# Patient Record
Sex: FEMALE | Race: OTHER | Employment: UNEMPLOYED | URBAN - METROPOLITAN AREA
[De-identification: names, ages, dates, MRNs, and addresses within clinical notes are randomized per-mention and may not be internally consistent; named-entity substitution may affect disease eponyms.]

---

## 2019-02-28 ENCOUNTER — OFFICE VISIT (OUTPATIENT)
Dept: FAMILY MEDICINE CLINIC | Age: 39
End: 2019-02-28

## 2019-02-28 VITALS
TEMPERATURE: 98.3 F | WEIGHT: 162 LBS | HEIGHT: 63 IN | BODY MASS INDEX: 28.7 KG/M2 | HEART RATE: 93 BPM | SYSTOLIC BLOOD PRESSURE: 131 MMHG | DIASTOLIC BLOOD PRESSURE: 71 MMHG

## 2019-02-28 DIAGNOSIS — Z23 ENCOUNTER FOR IMMUNIZATION: ICD-10-CM

## 2019-02-28 DIAGNOSIS — L97.411 SKIN ULCER OF RIGHT HEEL, LIMITED TO BREAKDOWN OF SKIN (HCC): ICD-10-CM

## 2019-02-28 DIAGNOSIS — E11.65 UNCONTROLLED TYPE 2 DIABETES MELLITUS WITH HYPERGLYCEMIA (HCC): Primary | ICD-10-CM

## 2019-02-28 LAB
BILIRUB UR QL STRIP: NEGATIVE
GLUCOSE POC: NORMAL MG/DL
GLUCOSE UR-MCNC: NORMAL MG/DL
KETONES P FAST UR STRIP-MCNC: NEGATIVE MG/DL
PH UR STRIP: 7.5 [PH] (ref 4.6–8)
PROT UR QL STRIP: NEGATIVE
SP GR UR STRIP: 1.01 (ref 1–1.03)
UA UROBILINOGEN AMB POC: NORMAL (ref 0.2–1)
URINALYSIS CLARITY POC: CLEAR
URINALYSIS COLOR POC: YELLOW
URINE BLOOD POC: NEGATIVE
URINE LEUKOCYTES POC: NORMAL
URINE NITRITES POC: NEGATIVE

## 2019-02-28 RX ORDER — SULFAMETHOXAZOLE AND TRIMETHOPRIM 800; 160 MG/1; MG/1
1 TABLET ORAL 2 TIMES DAILY
Qty: 20 TAB | Refills: 0 | Status: SHIPPED | OUTPATIENT
Start: 2019-02-28 | End: 2019-03-10

## 2019-02-28 RX ORDER — SILVER SULFADIAZINE 10 G/1000G
CREAM TOPICAL 2 TIMES DAILY
Qty: 50 G | Refills: 0 | Status: SHIPPED | OUTPATIENT
Start: 2019-02-28 | End: 2019-03-10

## 2019-02-28 RX ORDER — METFORMIN HYDROCHLORIDE 1000 MG/1
1000 TABLET ORAL 2 TIMES DAILY WITH MEALS
Qty: 180 TAB | Refills: 3 | Status: SHIPPED | OUTPATIENT
Start: 2019-02-28 | End: 2021-03-24

## 2019-02-28 RX ORDER — SYRINGE-NEEDLE,INSULIN,0.5 ML 30 GX5/16"
SYRINGE, EMPTY DISPOSABLE MISCELLANEOUS
Qty: 100 SYRINGE | Refills: 3 | Status: SHIPPED | OUTPATIENT
Start: 2019-02-28

## 2019-02-28 RX ORDER — CEPHALEXIN 500 MG/1
500 CAPSULE ORAL 2 TIMES DAILY
Qty: 20 CAP | Refills: 0 | Status: SHIPPED | OUTPATIENT
Start: 2019-02-28 | End: 2019-03-10

## 2019-02-28 NOTE — PROGRESS NOTES
HISTORY OF PRESENT ILLNESS  [facility] ALANNA Armstrong is a 35 y.o. female. HPI  Patient states she has an ulcer on the back of her right heel 1 month ago. She suffers with diabetes and has not taken medications for 1 month. She arrived February 4th. States that about 1 month ago was wearing very tight shoes that cause the problem in the back of the right foot. Used metformin and insulin,  15 units in am and 10 uints in pm  Also having back pain, radiates to the left leg. It has been there for  Years. Review of Systems   Constitutional: Negative for chills, fever, malaise/fatigue and weight loss. Respiratory: Negative for cough, shortness of breath and wheezing. Cardiovascular: Negative for chest pain and palpitations. Gastrointestinal: Negative for abdominal pain, constipation, diarrhea, heartburn, nausea and vomiting. Musculoskeletal: Positive for back pain. Negative for myalgias. Skin:        Ulcer, right heel   Neurological: Negative for dizziness, tingling and headaches. /71 (BP 1 Location: Left arm, BP Patient Position: Sitting)   Pulse 93   Temp 98.3 °F (36.8 °C) (Oral)   Ht 5' 3.19\" (1.605 m)   Wt 162 lb (73.5 kg)   LMP 02/20/2019   BMI 28.53 kg/m²   Physical Exam   Constitutional: She is oriented to person, place, and time. She appears well-developed and well-nourished. HENT:   Head: Normocephalic. Right Ear: External ear normal.   Left Ear: External ear normal.   Eyes: Conjunctivae and EOM are normal. Pupils are equal, round, and reactive to light. Neck: Normal range of motion. Neck supple. Cardiovascular: Normal rate, regular rhythm and normal heart sounds. No murmur heard. Pulmonary/Chest: Effort normal and breath sounds normal.   Abdominal: Soft. Bowel sounds are normal. She exhibits no distension. Musculoskeletal: Normal range of motion. She exhibits no edema or tenderness. Neurological: She is alert and oriented to person, place, and time.    Skin:   2 cm diameter ulcer dorsal aspect of right heel       ASSESSMENT and PLAN  Diagnoses and all orders for this visit:    1. Uncontrolled type 2 diabetes mellitus with hyperglycemia (HCC)  -     AMB POC GLUCOSE BLOOD, BY GLUCOSE MONITORING DEVICE  -     AMB POC URINALYSIS DIP STICK MANUAL W/O MICRO  -     insulin NPH/insulin regular (NOVOLIN 70/30, HUMULIN 70/30) 100 unit/mL (70-30) injection; 15 units sc in am, 10 units sc in pm  -     metFORMIN (GLUCOPHAGE) 1,000 mg tablet; Take 1 Tab by mouth two (2) times daily (with meals). -     Insulin Syringe-Needle U-100 (INSULIN SYRINGE) 0.5 mL 30 gauge x 5/16\" syrg; Use twice a day for insulin administration ICD 10: E11.9    2. Skin ulcer of right heel, limited to breakdown of skin (HCC)  -     trimethoprim-sulfamethoxazole (BACTRIM DS, SEPTRA DS) 160-800 mg per tablet; Take 1 Tab by mouth two (2) times a day for 10 days. -     cephALEXin (KEFLEX) 500 mg capsule; Take 1 Cap by mouth two (2) times a day for 10 days. -     silver sulfADIAZINE (SILVADENE) 1 % topical cream; Apply  to affected area two (2) times a day for 10 days. Right heel    diabetes is uncontrolled, will restart medications today. She has a heel ulcer,  We will treat with antibiotics.   She may need to go be evaluated at the hospital   If no improvement  Follow up in 4 weeks

## 2019-02-28 NOTE — PROGRESS NOTES
Printed AVS, provided to pt and reviewed. Pt indicated understanding and had no questions. Told pt that rx's have been sent to pharmacy and they should be ready for  in approximately 2 hrs. Pt told to please present GoodRx. com coupon which we provide to your pharmacy to receive discounted price. The all the medication ordered today was reviewed with the pt Osman Barksdale was the . Reviewed DM foot care and importance of getting and keeping DM under control. Th ept stated she had gotten the foot ulcer [place], and she had run out of her insulin. Requests flu vaccine; denies fever, egg allergy. Immunization given per protocol and recorded in [address]. VIS information sheet given, explained possible S/E. Reviewed sx indicating need to be seen in ER. Pt had no adverse reaction at time of discharge.  Jeff Bynum RN

## 2019-02-28 NOTE — PROGRESS NOTES
Results for orders placed or performed in visit on 02/28/19   AMB POC GLUCOSE BLOOD, BY GLUCOSE MONITORING DEVICE   Result Value Ref Range    Glucose POC [facility] mg/dL   AMB POC URINALYSIS DIP STICK MANUAL W/O MICRO   Result Value Ref Range    Color (UA POC) Yellow     Clarity (UA POC) Clear     Glucose (UA POC) 4+ Negative    Bilirubin (UA POC) Negative Negative    Ketones (UA POC) Negative Negative    Specific gravity (UA POC) 1.010 1.001 - 1.035    Blood (UA POC) Negative Negative    pH (UA POC) 7.5 4.6 - 8.0    Protein (UA POC) Negative Negative    Urobilinogen (UA POC) normal 0.2 - 1    Nitrites (UA POC) Negative Negative    Leukocyte esterase (UA POC) 1+ Negative

## 2019-04-29 ENCOUNTER — OFFICE VISIT (OUTPATIENT)
Dept: FAMILY MEDICINE CLINIC | Age: 39
End: 2019-04-29

## 2019-04-29 ENCOUNTER — HOSPITAL ENCOUNTER (OUTPATIENT)
Dept: LAB | Age: 39
Discharge: HOME | End: 2019-04-29

## 2019-04-29 VITALS
WEIGHT: 162 LBS | DIASTOLIC BLOOD PRESSURE: 80 MMHG | OXYGEN SATURATION: 99 % | HEART RATE: 96 BPM | SYSTOLIC BLOOD PRESSURE: 114 MMHG | BODY MASS INDEX: 28.7 KG/M2 | HEIGHT: 63 IN | TEMPERATURE: 98.2 F

## 2019-04-29 DIAGNOSIS — E11.65 UNCONTROLLED TYPE 2 DIABETES MELLITUS WITH HYPERGLYCEMIA (HCC): Primary | ICD-10-CM

## 2019-04-29 DIAGNOSIS — E11.65 UNCONTROLLED TYPE 2 DIABETES MELLITUS WITH HYPERGLYCEMIA (HCC): ICD-10-CM

## 2019-04-29 LAB
ALBUMIN SERPL-MCNC: 3.8 G/DL (ref 3.5–5)
ALBUMIN/GLOB SERPL: 1 {RATIO} (ref 1.1–2.2)
ALP SERPL-CCNC: 105 U/L (ref 45–117)
ALT SERPL-CCNC: 23 U/L (ref 12–78)
ANION GAP SERPL CALC-SCNC: 5 MMOL/L (ref 5–15)
AST SERPL-CCNC: 9 U/L (ref 15–37)
BASOPHILS # BLD: 0.1 K/UL (ref 0–0.1)
BASOPHILS NFR BLD: 1 % (ref 0–1)
BILIRUB SERPL-MCNC: 0.3 MG/DL (ref 0.2–1)
BUN SERPL-MCNC: 12 MG/DL (ref 6–20)
BUN/CREAT SERPL: 18 (ref 12–20)
CALCIUM SERPL-MCNC: 9.9 MG/DL (ref 8.5–10.1)
CHLORIDE SERPL-SCNC: 101 MMOL/L (ref 97–108)
CHOLEST SERPL-MCNC: 155 MG/DL
CO2 SERPL-SCNC: 29 MMOL/L (ref 21–32)
CREAT SERPL-MCNC: 0.67 MG/DL (ref 0.55–1.02)
DIFFERENTIAL METHOD BLD: NORMAL
EOSINOPHIL # BLD: 0.1 K/UL (ref 0–0.4)
EOSINOPHIL NFR BLD: 2 % (ref 0–7)
ERYTHROCYTE [DISTWIDTH] IN BLOOD BY AUTOMATED COUNT: 11.9 % (ref 11.5–14.5)
EST. AVERAGE GLUCOSE BLD GHB EST-MCNC: 275 MG/DL
GLOBULIN SER CALC-MCNC: 4 G/DL (ref 2–4)
GLUCOSE POC: NORMAL MG/DL
GLUCOSE SERPL-MCNC: 250 MG/DL (ref 65–100)
HBA1C MFR BLD: 11.2 % (ref 4.2–6.3)
HCT VFR BLD AUTO: 41.3 % (ref 35–47)
HDLC SERPL-MCNC: 48 MG/DL
HDLC SERPL: 3.2 {RATIO} (ref 0–5)
HGB BLD-MCNC: 13.7 G/DL (ref 11.5–16)
IMM GRANULOCYTES # BLD AUTO: 0 K/UL (ref 0–0.04)
IMM GRANULOCYTES NFR BLD AUTO: 0 % (ref 0–0.5)
LDLC SERPL CALC-MCNC: 79.8 MG/DL (ref 0–100)
LIPID PROFILE,FLP: NORMAL
LYMPHOCYTES # BLD: 2.3 K/UL (ref 0.8–3.5)
LYMPHOCYTES NFR BLD: 31 % (ref 12–49)
MCH RBC QN AUTO: 27.5 PG (ref 26–34)
MCHC RBC AUTO-ENTMCNC: 33.2 G/DL (ref 30–36.5)
MCV RBC AUTO: 82.8 FL (ref 80–99)
MONOCYTES # BLD: 0.5 K/UL (ref 0–1)
MONOCYTES NFR BLD: 6 % (ref 5–13)
NEUTS SEG # BLD: 4.6 K/UL (ref 1.8–8)
NEUTS SEG NFR BLD: 60 % (ref 32–75)
NRBC # BLD: 0 K/UL (ref 0–0.01)
NRBC BLD-RTO: 0 PER 100 WBC
PLATELET # BLD AUTO: 275 K/UL (ref 150–400)
PMV BLD AUTO: 11.2 FL (ref 8.9–12.9)
POTASSIUM SERPL-SCNC: 5 MMOL/L (ref 3.5–5.1)
PROT SERPL-MCNC: 7.8 G/DL (ref 6.4–8.2)
RBC # BLD AUTO: 4.99 M/UL (ref 3.8–5.2)
SODIUM SERPL-SCNC: 135 MMOL/L (ref 136–145)
TRIGL SERPL-MCNC: 136 MG/DL (ref ?–150)
VLDLC SERPL CALC-MCNC: 27.2 MG/DL
WBC # BLD AUTO: 7.5 K/UL (ref 3.6–11)

## 2019-04-29 PROCEDURE — 80053 COMPREHEN METABOLIC PANEL: CPT

## 2019-04-29 PROCEDURE — 83036 HEMOGLOBIN GLYCOSYLATED A1C: CPT

## 2019-04-29 PROCEDURE — 80061 LIPID PANEL: CPT

## 2019-04-29 PROCEDURE — 85025 COMPLETE CBC W/AUTO DIFF WBC: CPT

## 2019-04-29 NOTE — PROGRESS NOTES
HISTORY OF PRESENT ILLNESS Lizet Villanueva is a 35 y.o. female. HPI Patient states she had not been using insulin until now. Patient is trying to keep a good diet. The ulcer on her right heel seems to be healing now. She is now using insulin 20 units in the morning and 15 units at bedtime Review of Systems Constitutional: Negative for chills, fever, malaise/fatigue and weight loss. Respiratory: Negative for cough, shortness of breath and wheezing. Cardiovascular: Negative for chest pain and palpitations. Gastrointestinal: Negative for abdominal pain, constipation, diarrhea, heartburn, nausea and vomiting. Musculoskeletal: Negative for back pain and myalgias. Skin:  
     Ulcer, right heel has healed Neurological: Negative for dizziness, tingling and headaches. /80 (BP 1 Location: Right arm, BP Patient Position: Sitting)   Pulse 96   Temp 98.2 °F (36.8 °C) (Oral)   Ht 5' 3.19\" (1.605 m)   Wt 162 lb (73.5 kg)   LMP 04/20/2019   SpO2 99%   BMI 28.53 kg/m² Physical Exam  
Constitutional: She is oriented to person, place, and time. She appears well-developed and well-nourished. HENT:  
Head: Normocephalic. Right Ear: External ear normal.  
Left Ear: External ear normal.  
Eyes: Pupils are equal, round, and reactive to light. Conjunctivae and EOM are normal.  
Neck: Normal range of motion. Neck supple. Cardiovascular: Normal rate, regular rhythm and normal heart sounds. No murmur heard. Pulmonary/Chest: Effort normal and breath sounds normal.  
Abdominal: Soft. Bowel sounds are normal. She exhibits no distension. Musculoskeletal: Normal range of motion. She exhibits no edema or tenderness. Neurological: She is alert and oriented to person, place, and time. Skin: There is a 5 mm crust over the right heel is dry no discharge ASSESSMENT and PLAN Diagnoses and all orders for this visit: 
 
 1. Uncontrolled type 2 [facility] -     AMB POC GLUCOSE BLOOD, BY GLUCOSE MONITORING DEVICE 
-     LIPID PANEL; Future -     METABOLIC PANEL, COMPREHENSIVE; Future 
-     HEMOGLOBIN A1C WITH EAG; Future -     CBC WITH AUTOMATED DIFF; Future Labs today Return to office in 3 months Will order mammogram and pap smear ([facility])

## 2019-04-29 NOTE — PROGRESS NOTES
Coordination of Care 1. Have you been to the ER, urgent care clinic since your last visit? Hospitalized since your last visit? No 
 
2. Have you seen or consulted any other health care providers outside of the [address] [place] [address] since your last visit? Include any pap smears or colon screening. No 
 
Does the patient need refills? YES Learning Assessment Complete? yes Results for orders placed or performed in visit on 04/29/19 AMB POC GLUCOSE BLOOD, BY GLUCOSE MONITORING DEVICE Result Value Ref Range Glucose POC nf 285 mg/dL

## 2019-04-30 NOTE — PROGRESS NOTES
AVS not given due to printer issues. [facility] resource sheet given, and patient was instructed to call to go over financial screening over the phone. This has been fully explained to the patient, who indicates understanding and agrees with plan. No further questions at this time.  Davis Pichardo RN

## 2019-05-01 NOTE — PROGRESS NOTES
Diabetes is uncontrolled with a1c at 11.2%  Kidney function, liver function, cholesterol and blood count is normal  Patient can be informed

## 2019-07-31 ENCOUNTER — OFFICE VISIT (OUTPATIENT)
Dept: FAMILY MEDICINE CLINIC | Age: 39
End: 2019-07-31

## 2019-07-31 ENCOUNTER — HOSPITAL ENCOUNTER (OUTPATIENT)
Dept: LAB | Age: 39
Discharge: HOME | End: 2019-07-31

## 2019-07-31 ENCOUNTER — TELEPHONE (OUTPATIENT)
Dept: FAMILY MEDICINE CLINIC | Age: 39
End: 2019-07-31

## 2019-07-31 VITALS
SYSTOLIC BLOOD PRESSURE: 153 MMHG | WEIGHT: 166.6 LBS | HEART RATE: 107 BPM | DIASTOLIC BLOOD PRESSURE: 84 MMHG | BODY MASS INDEX: 29.34 KG/M2 | TEMPERATURE: 98.7 F

## 2019-07-31 DIAGNOSIS — I10 HYPERTENSION, UNSPECIFIED TYPE: ICD-10-CM

## 2019-07-31 DIAGNOSIS — M54.50 ACUTE RIGHT-SIDED LOW BACK PAIN WITHOUT SCIATICA: ICD-10-CM

## 2019-07-31 DIAGNOSIS — Z12.31 SCREENING MAMMOGRAM FOR HIGH-RISK PATIENT: ICD-10-CM

## 2019-07-31 DIAGNOSIS — E11.65 UNCONTROLLED TYPE 2 DIABETES MELLITUS WITH HYPERGLYCEMIA (HCC): Primary | ICD-10-CM

## 2019-07-31 DIAGNOSIS — E11.65 UNCONTROLLED TYPE 2 DIABETES MELLITUS WITH HYPERGLYCEMIA (HCC): ICD-10-CM

## 2019-07-31 DIAGNOSIS — K04.7 TOOTH ABSCESS: ICD-10-CM

## 2019-07-31 LAB
ALBUMIN SERPL-MCNC: 3.7 G/DL (ref 3.5–5)
ALBUMIN/GLOB SERPL: 0.9 {RATIO} (ref 1.1–2.2)
ALP SERPL-CCNC: 94 U/L (ref 45–117)
ALT SERPL-CCNC: 23 U/L (ref 12–78)
ANION GAP SERPL CALC-SCNC: 5 MMOL/L (ref 5–15)
AST SERPL-CCNC: 15 U/L (ref 15–37)
BILIRUB SERPL-MCNC: 0.3 MG/DL (ref 0.2–1)
BUN SERPL-MCNC: 16 MG/DL (ref 6–20)
BUN/CREAT SERPL: 23 (ref 12–20)
CALCIUM SERPL-MCNC: 10.1 MG/DL (ref 8.5–10.1)
CHLORIDE SERPL-SCNC: 104 MMOL/L (ref 97–108)
CHOLEST SERPL-MCNC: 177 MG/DL
CO2 SERPL-SCNC: 29 MMOL/L (ref 21–32)
CREAT SERPL-MCNC: 0.7 MG/DL (ref 0.55–1.02)
EST. AVERAGE GLUCOSE BLD GHB EST-MCNC: 295 MG/DL
GLOBULIN SER CALC-MCNC: 4 G/DL (ref 2–4)
GLUCOSE POC: NORMAL MG/DL
GLUCOSE SERPL-MCNC: 194 MG/DL (ref 65–100)
HBA1C MFR BLD: 11.9 % (ref 4.2–6.3)
HDLC SERPL-MCNC: 46 MG/DL
HDLC SERPL: 3.8 {RATIO} (ref 0–5)
HGB BLD-MCNC: 13.7 G/DL
LDLC SERPL CALC-MCNC: 100.6 MG/DL (ref 0–100)
LIPID PROFILE,FLP: ABNORMAL
POTASSIUM SERPL-SCNC: 4.7 MMOL/L (ref 3.5–5.1)
PROT SERPL-MCNC: 7.7 G/DL (ref 6.4–8.2)
SODIUM SERPL-SCNC: 138 MMOL/L (ref 136–145)
TRIGL SERPL-MCNC: 152 MG/DL (ref ?–150)
VLDLC SERPL CALC-MCNC: 30.4 MG/DL

## 2019-07-31 PROCEDURE — 80053 COMPREHEN METABOLIC PANEL: CPT

## 2019-07-31 PROCEDURE — 80061 LIPID PANEL: CPT

## 2019-07-31 PROCEDURE — 83036 HEMOGLOBIN GLYCOSYLATED A1C: CPT

## 2019-07-31 RX ORDER — LISINOPRIL 5 MG/1
5 TABLET ORAL DAILY
Qty: 90 TAB | Refills: 3 | Status: SHIPPED | OUTPATIENT
Start: 2019-07-31

## 2019-07-31 RX ORDER — AMOXICILLIN 500 MG/1
500 CAPSULE ORAL 3 TIMES DAILY
Qty: 30 CAP | Refills: 0 | Status: SHIPPED | OUTPATIENT
Start: 2019-07-31 | End: 2019-08-10

## 2019-07-31 NOTE — PROGRESS NOTES
HISTORY OF PRESENT ILLNESS  Horace Perkins is a 35 y.o. female. HPI  Patient here for follow on diabetes. Her glucose level average is 140s. Postprandial is 200. She is using insulin. She is also taking metformin tablet. Will need a new insulin bottle sent to the pharmacy.  she had tubal ligation. She has had some headaches and dizziness. For the mast week has had a loose tooth that is painful too. Low back pain on and off for the past few weeks  Review of Systems   Constitutional: Negative for chills, fever, malaise/fatigue and weight loss. HENT:        Dental abscess   Respiratory: Negative for cough, shortness of breath and wheezing. Cardiovascular: Negative for chest pain and palpitations. Gastrointestinal: Negative for abdominal pain, constipation, diarrhea, heartburn, nausea and vomiting. Musculoskeletal: Negative for back pain and myalgias. Neurological: Positive for dizziness and headaches. Negative for tingling. /84 (BP 1 Location: Left arm)   Pulse (!) 107   Temp 98.7 °F (37.1 °C) (Oral)   Wt 166 lb 9.6 oz (75.6 kg)   LMP 2019   BMI 29.34 kg/m²   Physical Exam   Constitutional: She is oriented to person, place, and time. She appears well-developed and well-nourished. HENT:   Head: Normocephalic. Right Ear: External ear normal.   Left Ear: External ear normal.   Left lower 2nd molar is loose and there is gum swelling and tenderness   Eyes: Pupils are equal, round, and reactive to light. Conjunctivae and EOM are normal.   Neck: Normal range of motion. Neck supple. No thyromegaly present. Cardiovascular: Normal rate, regular rhythm and normal heart sounds. No murmur heard. Pulmonary/Chest: Effort normal. No respiratory distress. She has no wheezes. She has no rales. Abdominal: Soft. Bowel sounds are normal. She exhibits no distension. There is no tenderness. There is no rebound. Musculoskeletal: Normal range of motion.  She exhibits no edema or tenderness. Neurological: She is alert and oriented to person, place, and time. ASSESSMENT and PLAN  Diagnoses and all orders for this visit:    1. Uncontrolled type 2 diabetes mellitus with hyperglycemia (HCC)  -     AMB POC GLUCOSE BLOOD, BY GLUCOSE MONITORING DEVICE  -     AMB POC HEMOGLOBIN (HGB)  -     LIPID PANEL; Future  -     METABOLIC PANEL, COMPREHENSIVE; Future  -     HEMOGLOBIN A1C WITH EAG; Future  -     insulin NPH/insulin regular (NOVOLIN 70/30, HUMULIN 70/30) 100 unit/mL (70-30) injection; 25 units sc in am, 20 units sc in pm    2. Hypertension, unspecified type  -     lisinopril (PRINIVIL, ZESTRIL) 5 mg tablet; Take 1 Tab by mouth daily. 3. Tooth abscess  -     amoxicillin (AMOXIL) 500 mg capsule; Take 1 Cap by mouth three (3) times daily for 10 days. 4. Acute right-sided low back pain without sciatica    5. Screening mammogram for high-risk patient  -     [facility] MAMMO BI SCREENING INCL CAD;  Future      Uncontrolled blood presure, will add lisinopril  Patient has a tooth abscess, will write for amoxicillin  Labs today  Follow up in 3 months

## 2019-07-31 NOTE — PROGRESS NOTES
Check-out Note: Good rx   Needs dentist   Needs mammogram (she tried to schedule but didn't understand application)     [facility] was contacted for the pt. The pt did not know not know the household income. The registrar stated that the appt could not be made until the financial screening was completed. The financial screening could not be completed. The pt was given the [facility] phone number to contact them when she could determine the household income. Then they could help her set up an appt. The pt was also told she could come back to the [facility] and get assistance making the appt if she could get the household income. Provided pt with dental resources. Printed AVS, provided to pt and reviewed. Pt indicated understanding and had no questions. Told pt that rx's have been sent to pharmacy and they should be ready for  in approximately 2 hrs. Pt told to please present GoodRx. com coupon which we provide to your pharmacy to receive discounted price. Reviewed all medication's ordered today with the pt. Izabel Saab was the .  Jo Ann Hurd, RN

## 2019-07-31 NOTE — TELEPHONE ENCOUNTER
Message sent with the correct insulin dose    Dr. Jak Mary    Call from [facility] regarding patient, Lia Harper, asking for clarification on directions for two prescriptions for insulin.     Tammy Lebron

## 2019-07-31 NOTE — PROGRESS NOTES
Coordination of Care  1. Have you been to the ER, urgent care clinic since your last visit? Hospitalized since your last visit? No    2. Have you seen or consulted any other health care providers outside of the [address] [place] [address] since your last visit? Include any pap smears or colon screening. No    Does the patient need refills? YES    Learning Assessment Complete?  yes  Depression Screening complete in the past 12 months? yes     Results for orders placed or performed in visit on 07/31/19   AMB POC GLUCOSE BLOOD, BY GLUCOSE MONITORING DEVICE   Result Value Ref Range    Glucose POC  mg/dL   AMB POC HEMOGLOBIN (HGB)   Result Value Ref Range    Hemoglobin (POC) 13.7

## 2019-08-01 NOTE — PROGRESS NOTES
Elevated hemoglobin a1c which shows poor diabetes control  May need to meet with our nutritionist  Please offer patient appointment with nutritionist

## 2019-09-12 ENCOUNTER — TELEPHONE (OUTPATIENT)
Dept: FAMILY MEDICINE CLINIC | Age: 39
End: 2019-09-12

## 2019-09-12 NOTE — TELEPHONE ENCOUNTER
Please call pt and schedule for nutrition appointment per Dr. Sunitha Chun. Pt needs help with diet due to diabetes.  Raman Schroeder RN

## 2019-09-12 NOTE — PROGRESS NOTES
Routing message to call back reg to schedule pt for nutrition appointment. Letter mailed to pt with labs and message from provider. Also, pt asked to please call the office to speak with a nurse to discuss labs.  aJ Varela RN

## 2019-10-17 ENCOUNTER — OFFICE VISIT (OUTPATIENT)
Dept: FAMILY MEDICINE CLINIC | Age: 39
End: 2019-10-17

## 2019-10-17 VITALS — BODY MASS INDEX: 28.17 KG/M2 | WEIGHT: 160 LBS

## 2019-10-17 DIAGNOSIS — Z23 ENCOUNTER FOR IMMUNIZATION: ICD-10-CM

## 2019-10-17 DIAGNOSIS — Z71.3 DIETARY COUNSELING AND SURVEILLANCE: Primary | ICD-10-CM

## 2019-10-17 NOTE — PROGRESS NOTES
[address]  Requests flu vaccine. Denies fever and egg allergy. VIS information sheet given to patient. Explained possible s/e. Reviewed s/sx indicating need to be seen in ER. Patient had no adverse reaction at time of discharge. Entered into [facility]. GIVEN BY DIANA العلي LPN.  Paula Carter RN

## 2019-10-17 NOTE — PROGRESS NOTES
DATE: 10/17/2019      REFERRING PHYSICIAN: Dr. Vonda Gutierrez   NAME: Jhonathan Melissa : 1980 AGE: 35 y.o. GENDER: female  REASON FOR VISIT: T2DM    ASSESSMENT:  Past Medical Hx: HTN      LABS:   Lab Results   Component Value Date/Time    Hemoglobin A1c 11.9 (H) 2019 01:08 PM       MEDICATIONS/SUPPLEMENTS:     Prior to Admission medications    Medication Sig Start Date End Date Taking? Authorizing Provider   insulin NPH/insulin regular (NOVOLIN 70/30, HUMULIN 70/30) 100 unit/mL (70-30) injection 25 units sc in am, 20 units sc in pm 19   Genaro Meléndez MD   lisinopril (PRINIVIL, ZESTRIL) 5 mg tablet Take 1 Tab by mouth daily. 19   Genaro Meléndez MD   metFORMIN (GLUCOPHAGE) 1,000 mg tablet Take 1 Tab by mouth two (2) times daily (with meals). 19   Genaro Meléndez MD   Insulin Syringe-Needle U-100 (INSULIN SYRINGE) 0.5 mL 30 gauge x \" syrg Use twice a day for insulin administration ICD 10: E11.9 19   Genaro Meléndez MD       FOOD ALLERGIES/INTOLERANCES: NA    ANTHROPOMETRICS:    Ht Readings from Last 1 Encounters:   19 5' 3.19\" (1.605 m)      Wt Readings from Last 1 Encounters:   10/17/19 160 lb (72.6 kg)        Reported Wt Hx:  Lost 6 lbs since last visit          24 Hour Diet Recall    Breakfast     Lunch 4pm Tortilla, 1  Cheese  [place] juice   Dinner 10pm Tortilla, 1  Cream cheese   Snacks     Beverages  water     Exercise/Physical Actvity:    NA        Environmental/Social:  Able to list some CHO foods  Works in a kitchen- (for 4 weeks)   \"I don't have a steady schedule for meals, because I am not hungry. \"  Using insulin, not checking BG at all          NUTRITION INTERVENTION:  RD introduced CHO foods and how they affect BG. Discussed that some foods have added sugar and some have naturally occurring sugars. Using food models, RD displayed common CHO foods and their appropriate portion sizes.  Emphasized that these foods need to be limited, portioned and always eaten in combination with PRO. Handouts provided: Good snack combos, T2DM nutrition therapy      PATIENT GOALS:  1) No more than 3 hours without food, will aim for small meals of PRO and CHO (examples given)    Specific tips and techniques to facilitate compliance with above recommendations were provided and discussed. Pt was strongly encouraged to begin making necessary changes now, and re-visit the dietitian prn.             Ron Kirby, [address] # [id number]

## 2019-11-20 ENCOUNTER — OFFICE VISIT (OUTPATIENT)
Dept: FAMILY MEDICINE CLINIC | Age: 39
End: 2019-11-20

## 2019-11-20 VITALS
HEART RATE: 106 BPM | TEMPERATURE: 98.8 F | SYSTOLIC BLOOD PRESSURE: 143 MMHG | BODY MASS INDEX: 27.61 KG/M2 | OXYGEN SATURATION: 96 % | DIASTOLIC BLOOD PRESSURE: 87 MMHG | HEIGHT: 63 IN | WEIGHT: 155.8 LBS

## 2019-11-20 DIAGNOSIS — E11.65 UNCONTROLLED TYPE 2 DIABETES MELLITUS WITH HYPERGLYCEMIA (HCC): ICD-10-CM

## 2019-11-20 DIAGNOSIS — J01.00 ACUTE NON-RECURRENT MAXILLARY SINUSITIS: Primary | ICD-10-CM

## 2019-11-20 LAB — GLUCOSE POC: NORMAL MG/DL

## 2019-11-20 RX ORDER — AMOXICILLIN AND CLAVULANATE POTASSIUM 875; 125 MG/1; MG/1
1 TABLET, FILM COATED ORAL 2 TIMES DAILY
Qty: 20 TAB | Refills: 0 | Status: SHIPPED | OUTPATIENT
Start: 2019-11-20 | End: 2019-11-30

## 2019-11-20 RX ORDER — BENZONATATE 200 MG/1
200 CAPSULE ORAL
Qty: 15 CAP | Refills: 2 | Status: SHIPPED | OUTPATIENT
Start: 2019-11-20 | End: 2019-11-25

## 2019-11-20 NOTE — PROGRESS NOTES
HISTORY OF PRESENT ILLNESS  Solitario Guillen is a 35 y.o. female. HPI  Patient has had a cough for about 1 week. Productive of a green phlegm. With headaches and right sided facial pain  States she has not been taking lisinopril at all. Patient also states she is not using her insulin. States work doesn't give her time to use the insulin. Metformin also she has not been using. States she stopped using for the past week because of the cough. Review of Systems   Constitutional: Positive for chills. HENT: Positive for congestion and sinus pain. Respiratory: Positive for cough and sputum production. Cardiovascular: Negative for chest pain, palpitations, orthopnea and claudication. Gastrointestinal: Negative for abdominal pain, nausea and vomiting. Genitourinary: Negative for dysuria, frequency and urgency. Musculoskeletal: Negative for back pain, myalgias and neck pain. /87 (BP 1 Location: Right arm, BP Patient Position: Sitting)   Pulse (!) 106   Temp 98.8 °F (37.1 °C) (Oral)   Ht 5' 3.19\" (1.605 m)   Wt 155 lb 12.8 oz (70.7 kg)   LMP 11/13/2019   SpO2 96%   BMI 27.43 kg/m²   Physical Exam  HENT:      Head: Normocephalic. Comments: Right maxillary sinus tenderness     Right Ear: Tympanic membrane normal.      Left Ear: Tympanic membrane normal.      Nose: Congestion present. Eyes:      Pupils: Pupils are equal, round, and reactive to light. Cardiovascular:      Rate and Rhythm: Normal rate. Heart sounds: No murmur. Pulmonary:      Effort: Pulmonary effort is normal. No respiratory distress. Breath sounds: No wheezing, rhonchi or rales. Musculoskeletal: Normal range of motion. General: No swelling. Neurological:      Mental Status: She is alert. ASSESSMENT and PLAN  Diagnoses and all orders for this visit:    1. Acute non-recurrent maxillary sinusitis  -     amoxicillin-clavulanate (AUGMENTIN) 875-125 mg per tablet;  Take 1 Tab by mouth two (2) times a day for 10 days. -     benzonatate (TESSALON) 200 mg capsule; Take 1 Cap by mouth three (3) times daily as needed for Cough for up to 5 days. 2. Uncontrolled type 2 diabetes mellitus with hyperglycemia (HCC)  -     AMB POC GLUCOSE BLOOD, BY GLUCOSE MONITORING DEVICE      Sinus infection, will start augmentin and tessalon as needed  Uncontrolled diabetes, patient has not been using any medications. Has them at home. Compliance with medications encouraged.   We will follow up in 3 months  She has appointment with nutritionist next month

## 2019-11-20 NOTE — PROGRESS NOTES
Avs discussed with Carlos Lung by Discharge Nurse Sabiha Avila LPN. Discussed medications prescribed today, good rx coupon printed and given to pt. Pt given info to call [facility], she had an appt but she missed it. Patient verbalized understanding and has no further questions.  AVS printed and given to patient Sabiha Avila LPN

## 2019-11-20 NOTE — PROGRESS NOTES
Coordination of Care  1. Have you been to the ER, urgent care clinic since your last visit? Hospitalized since your last visit? No    2. Have you seen or consulted any other health care providers outside of the [address] [place] [address] since your last visit? Include any pap smears or colon screening. No    Does the patient need refills? NO    Learning Assessment Complete?  yes  Depression Screening complete in the past 12 months? yes  Results for orders placed or performed in visit on 11/20/19   AMB POC GLUCOSE BLOOD, BY GLUCOSE MONITORING DEVICE   Result Value Ref Range    Glucose POC nf 418 mg/dL

## 2019-12-02 ENCOUNTER — TELEPHONE (OUTPATIENT)
Dept: FAMILY PLANNING/WOMEN'S HEALTH CLINIC | Age: 39
End: 2019-12-02

## 2019-12-02 NOTE — TELEPHONE ENCOUNTER
Left message via [employer]  with Pt's mother for pt to call Abi Abdi if she wishes to reschedule the mammogram she missed on 11/9/2019. # was given.

## 2020-01-09 ENCOUNTER — OFFICE VISIT (OUTPATIENT)
Dept: FAMILY MEDICINE CLINIC | Age: 40
End: 2020-01-09

## 2020-01-09 VITALS — WEIGHT: 151.6 LBS | BODY MASS INDEX: 26.69 KG/M2

## 2020-01-09 DIAGNOSIS — Z71.3 DIETARY COUNSELING AND SURVEILLANCE: Primary | ICD-10-CM

## 2020-01-09 NOTE — PROGRESS NOTES
DATE: 2020      REFERRING PHYSICIAN: Arlyn Orozco MD  NAME: Merrick Fernandez : 1980 AGE: 38 y.o. GENDER: female  REASON FOR VISIT: Diabetes Management    ASSESSMENT: Magdalena visits to follow-up on diabetes management she also has been intentionally losing weight. She has only been eating 3 times a day and not in between meals. Past Medical Hx: Diabetes Mellitus Type 2       LABS:   Lab Results   Component Value Date/Time    Hemoglobin A1c 11.9 (H) 2019 01:08 PM       MEDICATIONS/SUPPLEMENTS:   [unfilled]  Prior to Admission medications    Medication Sig Start Date End Date Taking? Authorizing Provider   insulin NPH/insulin regular (NOVOLIN 70/30, HUMULIN 70/30) 100 unit/mL (70-30) injection 25 units sc in am, 20 units sc in pm 19   Karin Rodriguez MD   lisinopril (PRINIVIL, ZESTRIL) 5 mg tablet Take 1 Tab by mouth daily. 19   Karin Rodriguez MD   metFORMIN (GLUCOPHAGE) 1,000 mg tablet Take 1 Tab by mouth two (2) times daily (with meals). 19   Karin Rodriguez MD   Insulin Syringe-Needle U-100 (INSULIN SYRINGE) 0.5 mL 30 gauge x 5/16\" syrg Use twice a day for insulin administration ICD 10: E11.9 19   Karin Rodriguez MD       FOOD ALLERGIES/INTOLERANCES: None    ANTHROPOMETRICS:    Reported Wt Hx  WT / BMI 2020 2019 10/17/2019 2019   WEIGHT 151 lb 9.6 oz 155 lb 12.8 oz 160 lb 166 lb 9.6 oz   BODY MASS INDEX 26.69 kg/m2 27.43 kg/m2 28.17 kg/m2 29.34 kg/m2     Reported Diet Hx:     24 Hour Diet Recall  Breakfast  Coffee with 2 tortillas and cheese   Lunch  Chicken and a salad   Dinner  2 tortillas with cheese, with juice or milk   Snacks  None. Beverages  8 - 10 Water bottles     Exercise/Physical Actvity:  She gets exercise walking at work, she works in a kitchen.          NUTRITION INTERVENTION:  Reviewed the importance of eating between meal snacks, eating whole grains, and eating vegetables for good health and blood sugar control. Shared a Donna Jones goal tracker to track progress for the next 3 months. We also reviewed portion control of fruit and pairing carbohydrates with proteins. Affirmed and encouraged weight loss efforts. PATIENT GOALS:  - Goal weight is 140 lbs  - Eat 2 snacks a day one between each meal with a protein and carb  - Eat a vegetable at 2 meals a day    Specific tips and techniques to facilitate compliance with above recommendations were provided and discussed. Pt was strongly encouraged to begin making necessary changes now, and re-visit the dietitian in 3 months.             Beatris Contreras RD

## 2020-02-19 ENCOUNTER — OFFICE VISIT (OUTPATIENT)
Dept: FAMILY MEDICINE CLINIC | Age: 40
End: 2020-02-19

## 2020-02-19 ENCOUNTER — HOSPITAL ENCOUNTER (OUTPATIENT)
Dept: LAB | Age: 40
Discharge: HOME | End: 2020-02-19

## 2020-02-19 VITALS
SYSTOLIC BLOOD PRESSURE: 145 MMHG | BODY MASS INDEX: 27.21 KG/M2 | HEART RATE: 98 BPM | OXYGEN SATURATION: 98 % | HEIGHT: 63 IN | DIASTOLIC BLOOD PRESSURE: 71 MMHG | TEMPERATURE: 98.6 F | WEIGHT: 153.6 LBS

## 2020-02-19 DIAGNOSIS — E11.621 ULCER OF RIGHT FIFTH TOE DUE TO DIABETES MELLITUS (HCC): ICD-10-CM

## 2020-02-19 DIAGNOSIS — E11.65 UNCONTROLLED TYPE 2 DIABETES MELLITUS WITH HYPERGLYCEMIA (HCC): Primary | ICD-10-CM

## 2020-02-19 DIAGNOSIS — E11.65 UNCONTROLLED TYPE 2 DIABETES MELLITUS WITH HYPERGLYCEMIA (HCC): ICD-10-CM

## 2020-02-19 DIAGNOSIS — L97.519 ULCER OF RIGHT FIFTH TOE DUE TO DIABETES MELLITUS (HCC): ICD-10-CM

## 2020-02-19 LAB
ALBUMIN SERPL-MCNC: 3.4 G/DL (ref 3.5–5)
ALBUMIN/GLOB SERPL: 0.9 {RATIO} (ref 1.1–2.2)
ALP SERPL-CCNC: 90 U/L (ref 45–117)
ALT SERPL-CCNC: 20 U/L (ref 12–78)
ANION GAP SERPL CALC-SCNC: 6 MMOL/L (ref 5–15)
AST SERPL-CCNC: 11 U/L (ref 15–37)
BILIRUB SERPL-MCNC: 0.2 MG/DL (ref 0.2–1)
BUN SERPL-MCNC: 17 MG/DL (ref 6–20)
BUN/CREAT SERPL: 28 (ref 12–20)
CALCIUM SERPL-MCNC: 8.7 MG/DL (ref 8.5–10.1)
CHLORIDE SERPL-SCNC: 106 MMOL/L (ref 97–108)
CHOLEST SERPL-MCNC: 149 MG/DL
CO2 SERPL-SCNC: 26 MMOL/L (ref 21–32)
CREAT SERPL-MCNC: 0.61 MG/DL (ref 0.55–1.02)
CREAT UR-MCNC: 207 MG/DL
GLOBULIN SER CALC-MCNC: 3.7 G/DL (ref 2–4)
GLUCOSE POC: NORMAL MG/DL
GLUCOSE SERPL-MCNC: 166 MG/DL (ref 65–100)
HDLC SERPL-MCNC: 55 MG/DL
HDLC SERPL: 2.7 {RATIO} (ref 0–5)
LDLC SERPL CALC-MCNC: 77.6 MG/DL (ref 0–100)
LIPID PROFILE,FLP: NORMAL
MICROALBUMIN UR-MCNC: 5.78 MG/DL
MICROALBUMIN/CREAT UR-RTO: 28 MG/G (ref 0–30)
POTASSIUM SERPL-SCNC: 3.6 MMOL/L (ref 3.5–5.1)
PROT SERPL-MCNC: 7.1 G/DL (ref 6.4–8.2)
SODIUM SERPL-SCNC: 138 MMOL/L (ref 136–145)
TRIGL SERPL-MCNC: 82 MG/DL (ref ?–150)
VLDLC SERPL CALC-MCNC: 16.4 MG/DL

## 2020-02-19 PROCEDURE — 82043 UR ALBUMIN QUANTITATIVE: CPT

## 2020-02-19 PROCEDURE — 83036 HEMOGLOBIN GLYCOSYLATED A1C: CPT

## 2020-02-19 PROCEDURE — 80053 COMPREHEN METABOLIC PANEL: CPT

## 2020-02-19 PROCEDURE — 80061 LIPID PANEL: CPT

## 2020-02-19 RX ORDER — AMOXICILLIN AND CLAVULANATE POTASSIUM 875; 125 MG/1; MG/1
1 TABLET, FILM COATED ORAL 2 TIMES DAILY
Qty: 20 TAB | Refills: 0 | Status: SHIPPED | OUTPATIENT
Start: 2020-02-19 | End: 2020-02-29

## 2020-02-19 NOTE — PROGRESS NOTES
HISTORY OF PRESENT ILLNESS  Supriya Amaro is a 38 y.o. female. HPI  Patient is doing well, using her insulin and the sugar levels  She is having red, tenderness of the right 5th toe and it feels warm to touch  Review of Systems   Constitutional: Negative for chills. HENT: Negative for congestion and sinus pain. Respiratory: Negative for cough and sputum production. Cardiovascular: Negative for chest pain, palpitations, orthopnea and claudication. Gastrointestinal: Negative for abdominal pain, nausea and vomiting. Genitourinary: Negative for dysuria, frequency and urgency. Musculoskeletal: Negative for back pain, myalgias and neck pain. Right fifth toe ulcer       Physical Exam  HENT:      Head: Normocephalic. Right Ear: Tympanic membrane normal.      Left Ear: Tympanic membrane normal.      Nose: No congestion. Eyes:      Pupils: Pupils are equal, round, and reactive to light. Cardiovascular:      Rate and Rhythm: Normal rate. Heart sounds: No murmur. Pulmonary:      Effort: Pulmonary effort is normal. No respiratory distress. Breath sounds: No wheezing, rhonchi or rales. Musculoskeletal: Normal range of motion. General: No swelling. Comments: There is a right fifth toe ulcer with swelling, erythema and tenderness   Neurological:      Mental Status: She is alert. ASSESSMENT and PLAN  Diagnoses and all orders for this visit:    1. Uncontrolled type 2 diabetes mellitus with hyperglycemia (HCC)  -     AMB POC GLUCOSE BLOOD, BY GLUCOSE MONITORING DEVICE  -     insulin NPH/insulin regular (NOVOLIN 70/30, HUMULIN 70/30) 100 unit/mL (70-30) injection; 25 units sc in am, 20 units sc in pm  -     LIPID PANEL; Future  -     METABOLIC PANEL, COMPREHENSIVE; Future  -     HEMOGLOBIN A1C WITH EAG; Future  -     MICROALBUMIN, UR, RAND W/ MICROALB/CREAT RATIO; Future    2.  Ulcer of right fifth toe due to diabetes mellitus ([facility] [address]  - amoxicillin-clavulanate (AUGMENTIN) 875-125 mg per tablet; Take 1 Tab by mouth two (2) times a day for 10 days.       Check labs today  Cover with augmentin  Follow up in 4 weeks

## 2020-02-19 NOTE — PROGRESS NOTES
Avs discussed with Devan Houser by Discharge Nurse Staci Millan LPN. Discussed medications prescribed today, good rx coupon printed and given to pt. Patient verbalized understanding and has no further questions.  AVS printed and given to patient Staci Millan LPN

## 2020-02-19 NOTE — PROGRESS NOTES
Coordination of Care  1. Have you been to the ER, urgent care clinic since your last visit? Hospitalized since your last visit? No    2. Have you seen or consulted any other health care providers outside of the [address] [place] [address] since your last visit? Include any pap smears or colon screening. No    Does the patient need refills? YES    Learning Assessment Complete?  yes  Depression Screening complete in the past 12 months? yes    Results for orders placed or performed in visit on 02/19/20   AMB POC GLUCOSE BLOOD, BY GLUCOSE MONITORING DEVICE   Result Value Ref Range    Glucose POC  mg/dL

## 2020-02-20 LAB
EST. AVERAGE GLUCOSE BLD GHB EST-MCNC: 346 MG/DL
HBA1C MFR BLD: 13.7 % (ref 4–5.6)

## 2020-02-20 NOTE — PROGRESS NOTES
Uncontrolled diabetes, hemoglobin a1c at 13.7%  Normal liver function, kidney funciton, electrolytes, cholesterol  Needs to be more strict with her diet, and compliant with the insulin so that the diabetes improves  Patient can be informed,  She has follow up appt scheduled

## 2020-03-17 ENCOUNTER — OFFICE VISIT (OUTPATIENT)
Dept: FAMILY MEDICINE CLINIC | Age: 40
End: 2020-03-17

## 2020-03-17 ENCOUNTER — TELEPHONE (OUTPATIENT)
Dept: FAMILY MEDICINE CLINIC | Age: 40
End: 2020-03-17

## 2020-03-17 DIAGNOSIS — L97.401 DIABETIC ULCER OF MIDFOOT ASSOCIATED WITH TYPE 2 DIABETES MELLITUS, LIMITED TO BREAKDOWN OF SKIN, UNSPECIFIED LATERALITY (HCC): Primary | ICD-10-CM

## 2020-03-17 DIAGNOSIS — E11.621 DIABETIC ULCER OF MIDFOOT ASSOCIATED WITH TYPE 2 DIABETES MELLITUS, LIMITED TO BREAKDOWN OF SKIN, UNSPECIFIED LATERALITY (HCC): Primary | ICD-10-CM

## 2020-03-17 DIAGNOSIS — E11.65 UNCONTROLLED TYPE 2 DIABETES MELLITUS WITH HYPERGLYCEMIA (HCC): ICD-10-CM

## 2020-03-17 RX ORDER — CEPHALEXIN 500 MG/1
500 CAPSULE ORAL 4 TIMES DAILY
Qty: 40 CAP | Refills: 0 | Status: SHIPPED | OUTPATIENT
Start: 2020-03-17 | End: 2020-03-27

## 2020-03-17 NOTE — PATIENT INSTRUCTIONS
Crystal de los pies en personas con diabetes: Instrucciones de cuidado Diabetes Foot Health: Care Instructions Instrucciones de cuidado Cuando usted tiene diabetes, merlin pies necesitan más cuidado y [place]. La diabetes puede dañar las terminaciones nerviosas y los vasos sanguíneos de los pies y provocar que usted no se dé cuenta de que merlin pies están lesionados. La diabetes también limita la capacidad del cuerpo para atacar las infecciones y llevar la karolina a las zonas que la requieren. Renetta herida chiquis en el pie podría convertirse en Thaddeus Girt o renetta infección grave. Usted puede prevenir la mayoría de estos problemas teniendo un buen cuidado de merlin pies. Cuidar merlin pies puede ser rápido y fácil. La mayor parte del cuidado puede hacerse cuando usted se baña o se prepara para ir a dormir. La atención de seguimiento es renetta parte clave de cao tratamiento y seguridad. Asegúrese de hacer y acudir a todas las citas, y llame a cao médico si está teniendo problemas. También es renetta buena idea saber los resultados de merlin exámenes y mantener renetta lista de los medicamentos que lisseth. Cómo puede cuidarse en el hogar? · Mantenga cao azúcar en la karolina cerca del nivel normal observando qué y cuánto come, monitoreando cao nivel de azúcar en la karolina, tomando los medicamentos si se los madrid recetado y haciendo ejercicio con regularidad. · No fume. Fumar afecta el flujo de karolina y puede [employer] problemas de los pies. Si necesita ayuda para dejar de fumar, hable con cao médico [employer] y medicamentos para dejar de fumar. Pueden aumentar merlin probabilidades de dejar el hábito para siempre. · Consuma renetta dieta baja en grasas. Un consumo alto de grasa puede provocar renetta acumulación en los vasos sanguíneos y reducir el flujo de [place]. · Inspeccione merlin pies a diario para octavia si tienen ampollas, cortaduras, grietas o llagas. Si no puede octavia bunny, utilice un jamey o pídale a alguien [facility]. · Cuídese los pies: 
? [facility] días. Use agua tibia (no caliente). Revise la temperatura del agua con cao umberto u otra parte del cuerpo, no con merlin pies. ? Seque bunny merlin pies. Séquelos con toques suaves de toalla. No frote demasiado la piel de los pies. Seque bunny entre los dedos de los pies. Si la piel de los pies [place], pueden crecer bacterias u hongos, que pueden provocar renetta infección. ? Mantenga cao piel suave. Use crema humectante para la piel para mantener la piel de los pies [place] y prevenir callosidades y grietas. Joao no se ponga crema entre los dedos de los pies, y deje el uso de cualquier crema que le cause salpullido. ? Limpie con cuidado debajo de las uñas de los pies. No utilice objetos cortantes para limpiar debajo de las uñas de los pies. Use el extremo sin sophy de renetta lima para uñas u otro instrumento redondeado. ? Recorte y lime las uñas de los pies en forma recta para prevenir las uñas encarnadas (enterradas). Use un cortaúñas, no tijeras. Use renetta lima de esmeril para [employer]. · Cámbiese los calcetines a diario. Las medias sin costura son las mejores, porque las costuras suelen rozar los pies. Puede encontrar calcetines en catálogos especializados para personas con diabetes. · Revise merlin zapatos todos los días en busca de cosas edmar piedrecillas o revestimiento rasgado del zapato, que le puedan causar ampollas o llagas. · Cómprese zapatos que le queden bunny: 
? Busque zapatos que tengan suficiente espacio alkiarra [employer] dedos. [place] ayuda a prevenir juanetes y ampollas. ? Pruébese los zapatos con el tipo de calcetín que usará de manera habitual con ellos. ? Evite los zapatos plásticos. Éstos podrían rozar merlin pies y causar ampollas. Los zapatos de buena calidad deben estar hechos de materiales que tarun flexibles y dejen circular el aire, edmar cuero o yohana.  
? [employer] zapatos nuevos poco a poco usándolos no más de [place] al día analilia varios días. Tómese más tiempo para revisar merlin pies para zonas enrojecidas, ampollas u otros problemas después de usar unos zapatos nuevos. · No camine descalzo. No use sandalias ni zapatos con suelas muy delgadas. Las suelas delgadas se rompen con facilidad. Tampoco protegen merlin pies del pavimento caliente o de las temperaturas frías. · Pídale a cao médico que le revise los pies en cada consulta. Si tiene algún [employer], consulte a cao médico. No intente tratar un problema del pie con elyssa caseros. Los elyssa o tratamientos caseros de venta glendy (edmar los eliminadores de callos) pueden ser dañinos. · Siempre busque un tratamiento temprano para los problemas de los pies. Renetta irritación chiquis puede causar un problema importante si no se trata pronto y de [place]. Cuándo debe pedir ayuda? Llame a cao médico ahora mismo o busque atención médica inmediata si: 
  · Presenta renetta llaga en el pie, renetta úlcera o renetta grieta en la piel que no sanan después de 4 días, callos o callosidades que sangran, o renetta uña encarnada.  
  · Tiene zonas de la piel de color azulado o negruzco, que pueden significar moretones o problemas de flujo de [place].  
  · Tiene piel descamada o ampollas diminutas entre los dedos de los pies, o piel agrietada o supurante.  
  · Tiene fiebre por más de 24 horas y renetta llaga en el pie.  
  · Siente nuevo entumecimiento u hormigueo en los pies que no desaparece después de  los pies o cambiar de posición.  
  · Tiene renetta hinchazón inexplicable o inusual en el pie o el tobillo.  
 Preste especial atención a los cambios en cao sean y asegúrese de comunicarse con cao médico si: 
  · No puede cuidarse los pies de [place] apropiada. Dónde puede encontrar más información en inglés? Tanne Rafal a [email or web address]-shabana.info/ Escriba A739 en la búsqueda para aprender más acerca de \"Sean de los pies en personas con diabetes: Instrucciones de cuidado. \" 
 Revisado: 19 sheryl, 2019Versión del contenido: 12.4 © 3149-9497 Healthwise, Incorporated. Las instrucciones de cuidado fueron adaptadas bajo licencia por [employer] (which disclaims liability or warranty for this information). Si usted tiene [place] afección médica o sobre estas instrucciones, siempre pregunte a cao profesional de sean. [employer] niega toda garantía o responsabilidad por cao uso de esta información.

## 2020-03-17 NOTE — Clinical Note
Chinmay Marti, she needs a f/up phone call in 2 days to assess her toe sxs (sounds like diabetic foot ulcer). Would be great if she could get us a photo but not sure how to do this. In the meantime, she needs to protect the skin, keep it clean, bandage it, don't wear any tight shoes and take the meds. She was instructed to get to the ER if sxs worsen. Cathy Sesay

## 2020-03-17 NOTE — PROGRESS NOTES
Assessment/Plan:    Diagnoses and all orders for this visit:    1. 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 2. Uncontrolled type 2 diabetes mellitus with hyperglycemia (HCC)  -     insulin NPH/insulin regular (NOVOLIN 70/30, HUMULIN 70/30) 100 unit/mL (70-30) injection; 25 units sc in am, 20 units sc in pm    Pt has been using less then the prescribed amount of insulin each day, she has been using 20 units qam and 15 qpm. She was instructed to take 25 units qam and 20 units qpm. Likely will need an even higher dose. She was only able to tell me one recent blood sugar reading which was 208. Close f/up in 2 days with phone call from nurse. Go to ER if sxs worsen, pt states that she understands    The pt was instructed on precautions to take due to the CoVID 19 crisis, she is aware and will follow the social distancing and hand washing instructions    Follow-up and Dispositions    · Return in about 2 weeks (around 3/31/2020). NAEEM Coronel expressed understanding of this plan. An AVS was printed and given to the patient.      ----------------------------------------------------------------------    No chief complaint on file. History of Present Illness:  Pt was called using a virtual visit platform. We are currently closed for live in person appt's due to the CoVID 19 crisis. I have reviewed her chart before the visit today. She states that the wound on her pinky toe continues to bother her and she has more pain in it now. She has had to stop work due to the fact that she is required to wear tight shoes for work. The shoes were causing her too much discomfort. She states that the skin around the wound appears \"purplish\". She does not note any pus coming from the wound.  She can not see \"bone\" in the wound. This is a very tricky situation but since she is afebrile and has no pus will try to tx as outpt. The goal will be to get her into wound care once the CoVID crisis has resolved  She is using her insulin incorrectly and we will get her up to the rx'd amount  She states that she has her other meds at home and is taking them as directed        No past medical history on file. Current Outpatient Medications   Medication Sig Dispense Refill    cephALEXin (KEFLEX) 500 mg capsule Take 1 Cap by mouth four (4) times daily for 10 days. 40 Cap 0    insulin NPH/insulin regular (NOVOLIN 70/30, HUMULIN 70/30) 100 unit/mL (70-30) injection 25 units sc in am, 20 units sc in pm 10 mL 5    lisinopril (PRINIVIL, ZESTRIL) 5 mg tablet Take 1 Tab by mouth daily. 90 Tab 3    metFORMIN (GLUCOPHAGE) 1,000 mg tablet Take 1 Tab by mouth two (2) times daily (with meals). 180 Tab 3    Insulin Syringe-Needle U-100 (INSULIN SYRINGE) 0.5 mL 30 gauge x 5/16\" syrg Use twice a day for insulin administration ICD 10: E11.9 100 Syringe 3       No Known Allergies    Social History     Tobacco Use    Smoking status: Never Smoker    Smokeless tobacco: Never Used   Substance Use Topics    Alcohol use: No     Frequency: Never    Drug use: No       No family history on file. Physical Exam:     There were no vitals taken for this visit.     A&Ox3  WDWN NAD  Respirations normal and non labored

## 2020-03-17 NOTE — PROGRESS NOTES
In the meantime, she needs to protect the skin, keep it clean, bandage it, don't wear any tight shoes and take the meds. She was instructed to get to the ER if sxs worsen. .. Spoke to pt on the phone with Emma Barron as . Ordered medications reviewed with pt and coupon texted to pt. Told her she would receive a call back from a nurse in a few days to check on her toe ulcer. In the meantime, I stressed that she should \"protect the skin, keep it clean, bandage it, don't wear any tight shoes and take the meds. She was instructed to get to the ER if sxs worsen\" as suggested by the provider. Pt verbalized understanding. Explained that a registrar would call her to make FU appointment for 2 weeks. All questions answered and pt verbalized understanding. Staff message sent to front office for FU appointment and to [facility] for nurse call back.  Jose Carcamo RN

## 2020-03-17 NOTE — TELEPHONE ENCOUNTER
----- Message from Jimy Vu RN sent at 3/17/2020  1:15 PM EDT -----  Regarding: Follow up call  Valdivia Lequire would like this pt to get a follow up phone call. She wrote:     \"needs a f/up phone call in 2 days to assess her toe sxs (sounds like diabetic foot ulcer). Would be great if she could get us a photo but not sure how to do this. \"    Thank you

## 2020-03-19 ENCOUNTER — TELEPHONE (OUTPATIENT)
Dept: FAMILY MEDICINE CLINIC | Age: 40
End: 2020-03-19

## 2020-03-19 NOTE — TELEPHONE ENCOUNTER
Tc to the pt to check on her toe ulcer. The pt stated the wound is not open or draining. It looks purple and red when she presses on it. Its a little bit hot. Pt denies injury to toe. 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 She stated maybe her shoes rub the toe. The pt stated the Dr checked her feet 1 month ago and said they looked red. The pt was asked if she thought the toe has gotten worse since she spoke with the provider 2 days ago. The pt stated it's a little better and I have been taking the medication she prescribed for me. The pt's Dtr then stated no the toe was awful it is increased in pain and swelling since the pt spoke with the provider. The pt sent unidentified photos of the toe ulcer. These were sent to the provider. The provider instruction on unidentified pt regarding the toe was to call the pt next week to check on the status of the toe. The provider had told the pt to go to the ED if the toe got worse. Otherwise the nurse will call and check on her tomorrow. Signs and symptoms  Of infection were reviewed with the pt and reasons she should go to the emergency room reviewed with the pt. Windy Templeton was the .  Sara Nicholson RN

## 2020-03-20 NOTE — TELEPHONE ENCOUNTER
CBN to contact the pt next week week of 03/23/20. Check on the status of her toe ulcer.  Marcell Gandara RN

## 2020-03-25 ENCOUNTER — TELEPHONE (OUTPATIENT)
Dept: FAMILY MEDICINE CLINIC | Age: 40
End: 2020-03-25

## 2020-03-26 NOTE — TELEPHONE ENCOUNTER
Tc to the pt. Delman Litten was the . The pt stated her toe ulcer was better she thought, but this week she started having numbness during the night in both feet. She also is c/o pain in both feet when she stands up out of bed in the morning. The pt was told the message would be relayed to the provider but she will most likely need a new virtual visit with the provider since this is a new problem. The pt was asked if she could send more photos today of her toe ulcer. She stated she would send some. Unidentifiable photos were obtained and sent to the provider. The Provider was routed this message.  Feli Condon RN

## 2020-03-26 NOTE — TELEPHONE ENCOUNTER
Pt's photo reviewed and the ulcer is healing well, appears to be completely intact now, no redness or pus seen on photo. She will need to be seen for new problem with numbness in her feet.  Nurse aware and has let the pt know

## 2020-03-31 ENCOUNTER — OFFICE VISIT (OUTPATIENT)
Dept: FAMILY MEDICINE CLINIC | Age: 40
End: 2020-03-31

## 2020-03-31 DIAGNOSIS — M72.2 PLANTAR FASCIITIS: ICD-10-CM

## 2020-03-31 DIAGNOSIS — L97.501 SKIN ULCER OF TOE, LIMITED TO BREAKDOWN OF SKIN, UNSPECIFIED LATERALITY (HCC): Primary | ICD-10-CM

## 2020-03-31 RX ORDER — CEPHALEXIN 500 MG/1
500 CAPSULE ORAL 4 TIMES DAILY
COMMUNITY
End: 2021-03-24

## 2020-03-31 NOTE — PROGRESS NOTES
Assessment/Plan:    Diagnoses and all orders for this visit:    1. Skin ulcer of toe, limited to breakdown of skin, unspecified laterality ([facility] [address]    2. Plantar fasciitis  Ulcer on toe is healed, on last day of abx today  Exercises and lifestyle changes to tx plantar fasciitis    Asked her to check her blood sugar PRN if she is able to, she has not in the past 2 weeks. She is taking insulin as directed now and has no sxs of high blood sugar     Follow-up and Dispositions    · Return in about 6 weeks (around 2020). NAEEM Gutierrez expressed understanding of this plan. An AVS was printed and given to the patient.      ----------------------------------------------------------------------    Chief Complaint   Patient presents with    Follow-up     toe ulcer       History of Present Illness:  Telephone/ virtual visit f/up appt today  Pt was identified using name and   Pt consented verbally to the telephone visit today  She was called for f/up toe ulcer and DM. She states that this is all better, that there is no pain or redness or skin breakdown. She is on her last day of abx now  She has a problem with pain under her foot when she gets up in the morning, it tends to improve until bedtime when it is described as painful again  No injury to her foot  No redness to her foot  We discussed wearing supportive shoes, rolling her foot on frozen ice bottle and doing exercises to improve the sxs  She missed her dietician appt- will see if she wants to reschedule      No past medical history on file. Current Outpatient Medications   Medication Sig Dispense Refill    cephALEXin (KEFLEX) 500 mg capsule Take 500 mg by mouth four (4) times daily.  insulin NPH/insulin regular (NOVOLIN 70/30, HUMULIN 70/30) 100 unit/mL (70-30) injection 25 units sc in am, 20 units sc in pm 10 mL 5    lisinopril (PRINIVIL, ZESTRIL) 5 mg tablet Take 1 Tab by mouth daily.  90 Tab 3    metFORMIN (GLUCOPHAGE) 1,000 mg tablet Take 1 Tab by mouth two (2) times daily (with meals). 180 Tab 3    Insulin Syringe-Needle U-100 (INSULIN SYRINGE) 0.5 mL 30 gauge x 5/16\" syrg Use twice a day for insulin administration ICD 10: E11.9 100 Syringe 3       No Known Allergies    Social History     Tobacco Use    Smoking status: Never Smoker    Smokeless tobacco: Never Used   Substance Use Topics    Alcohol use: No     Frequency: Never    Drug use: No       No family history on file.     Physical Exam:     Visit Vitals  LMP 03/24/2020       A&Ox3  WDWN NAD  Respirations normal and non labored

## 2020-03-31 NOTE — PROGRESS NOTES
Spoke with pt via phone with her consent. Rebel Venegas interpreted for this encounter. Intake assessments addressed and med rec completed. Reymundo Lopes RN     Called pt back and discussed exercises for plantar fasciitis. Reviewed towel stretch, calf stretch, and stretch on stairs. Also advised her to try and  objects with her toes such as a towel or marble. Reinforced provider's suggestion of always wearing shoes and using a frozen water bottle to roll on feet to help with the pain. Rebel Venegas interpreted for this encounter. All questions answered and pt verbalized understanding. Reymundo Lopes RN

## 2020-03-31 NOTE — Clinical Note
Natalie, would you send her AVS? Shyam Wells, would you go over the exercises for plantar fasciitis with Willow? I told her about using frozen water bottle and wearing shoes at all time. Thank you   Front office: she missed her appt with dietician, does she want to reschedule? Also can you give her appt in 6 weeks for f/up DM with Sherry Silva?

## 2020-03-31 NOTE — PROGRESS NOTES
Coordination of Care  1. Have you been to the ER, urgent care clinic since your last visit? Hospitalized since your last visit? No    2. Have you seen or consulted any other health care providers outside of the [address] [place] [address] since your last visit? Include any pap smears or colon screening. No    Does the patient need refills? No    Learning Assessment Complete?  Yes

## 2020-03-31 NOTE — PATIENT INSTRUCTIONS
Fascitis plantar: Instrucciones de cuidado Plantar Fasciitis: Care Instructions Instrucciones de cuidado La fascitis plantar es un dolor e inflamación de la fascia plantar, el tejido en la parte inferior del pie que conecta el hueso del talón a los dedos del pie. La fascia plantar también sostiene el arco. Si tensiona la fascia plantar, puede desarrollar pequeños desgarros y causar dolor en el talón al levantarse o al caminar. La fascitis plantar puede ser ocasionada por correr o practicar otros deportes. También puede presentarse en personas con sobrepeso o que tienen claudio altos o pies planos. Usted puede tener fascitis plantar si camina o permanece parado [employer], o tiene un tendón de Gilbert tenso o músculos de la pantorrilla rígidos. Puede mejorar cao dolor de pie con descanso y [de-identified] cuidados en el [facility]. Podría llevar unas cuantas semanas a algunos meses para que aco pie sane completamente. La atención de seguimiento es renetta parte clave de cao tratamiento y seguridad. Asegúrese de hacer y acudir a todas las citas, y llame a cao médico si está teniendo problemas. También es renetta buena idea saber los resultados de merlin exámenes y mantener renetta lista de los medicamentos que lisseth. Cómo puede cuidarse en el [facility]? · Descanse cao pie a menudo. Reduzca cao actividad hasta un nivel que le permita evitar el dolor. Si es posible, no corra ni camine sobre superficies duras. · [employer] analgésicos (medicamentos para el dolor) exactamente según las indicaciones. ? Si el médico le recetó un analgésico, tómelo según las indicaciones. ? Si no está tomando un analgésico recetado, tómese un antiinflamatorio de venta glendy para el dolor y la hinchazón, edmar ibuprofeno (Advil, Motrin) o naproxeno (Aleve). Diana y siga todas las instrucciones de la [place]. · Aplíquese masajes con hielo para ayudar con el dolor y la hinchazón. Puede utilizar cubitos de hielo o renetta copa de hielo varias veces al día. Para hacer renetta copa de hielo, llene renetta taza de papel con agua y congélela. Dre la parte superior de la taza hasta que sobresalga media pulgada (1.25 cm) de hielo. Sosténgala por la parte remanente de papel para utilizar la copa. Frote el hielo en círculos pequeños sobre la steve analilia 5 a 7 minutos. · Los burak alternados con [place] y agua fría también pueden ayudar a disminuir la hinchazón. Joao edmar el calor solo podría empeorar el dolor y la hinchazón, finalice un baño de contraste sumergiendo el pie en agua fría. · Use renetta tablilla (férula) nocturna si cao médico se lo sugiere. Renetta tablilla nocturna mantiene el pie con los dedos apuntando Gilbert y el pie y el tobillo a un ángulo de 90 grados. Esta posición le proporciona un estiramiento suave y ashli a la parte inferior del pie. · Bang ejercicios simples edmar estiramientos de la pantorrilla y estiramientos con renetta toalla 2 a 3 veces al día, especialmente al [employer]. Éstos pueden ayudar a la fascia plantar a volverse más flexible. También hacen que se fortalezcan los músculos que soportan el arco. Mantenga estos estiramientos analilia 15 a 30 segundos cada vez. Repítalos 2 a 4 veces. ? Párese a 1 pie (31 cm) de la pared. Coloque las waldo de ambas alia contra la pared a la altura del pecho. Inclínese hacia adelante contra la pared, mantenga renetta pierna con la rodilla recta y el talón en el suelo mientras dobla la rodilla de la otra pierna. ? Siéntese en el suelo o en renetta colchoneta con los pies estirados al frente. Enrolle renetta toalla a lo janina y colóquela alrededor de la parte anterior de la planta de daniel de los pies. Sosteniendo la toalla por ambos extremos, jálela suavemente hacia usted para estirar cao pie. · Use zapatos con buen soporte para el arco. El calzado deportivo o los zapatos con la suela bunny acolchada son renetta Dottie Lopez.  
· Pruebe con cuñas o plantillas (ortóticos) para ayudar a amortiguar el talón. Se pueden comprar en muchas tiendas de calzado. · Póngase los zapatos tan pronto se levante de la cama. Andar descalzo o usar pantuflas podría empeorar el dolor. · Alcance un buen peso de acuerdo a tom estatura, y [place]. [place] valentina la carga de los pies. Cuándo debe pedir ayuda? Llame a tom médico ahora mismo o busque atención médica inmediata si: 
  · Tiene dolor en el talón con fiebre, enrojecimiento o calor.  
  · No puede cargar peso sobre el pie adolorido.  
 Preste especial atención a los cambios en tom sean y asegúrese de comunicarse con tom médico si: 
  · Siente hormigueo o entumecimiento en el talón.  
  · El dolor en el talón dura más de 2 semanas. Dónde puede encontrar más información en inglés? Karo Sterling a http://jaycob-shabana.info/ Alfredo Nance X726 en la búsqueda para aprender más acerca de \"Fascitis plantar: Instrucciones de cuidado. \" Revisado: 26 junio, 2019Versión del contenido: 12.4 © 8188-1916 Healthwise, Incorporated. Las instrucciones de cuidado fueron adaptadas bajo licencia por [employer] (which disclaims liability or warranty for this information). Si usted tiene [place] afección médica o sobre estas instrucciones, siempre pregunte a tom profesional de sean. Healthwise, Incorporated niega toda garantía o responsabilidad por tom uso de esta información. Fascitis plantar: Ejercicios Plantar Fasciitis: Exercises Instrucciones de cuidado Aquí se presentan algunos ejemplos de ejercicios típicos de rehabilitación para tratar tom afección. Empiece cada ejercicio lentamente. Reduzca la intensidad del ejercicio si Austyn Alvarez a tener dolor. Tom médico o fisioterapeuta le dirán cuándo puede comenzar con estos ejercicios y cuáles funcionarán mejor para usted. Cómo hacer los ejercicios Estiramiento con toalla 1. Siéntese con las piernas extendidas y las rodillas rectas. 2. Coloque renetta toalla alrededor de cao pie alejandrina por debajo de los dedos. 3. Sostenga cada extremo de la toalla con cada mano, con las alia por encima de las rodillas. 4. Jale hacia atrás con la toalla para que el pie se extienda hacia usted. 5. Mantenga la posición por lo menos de 15 a 30 segundos. 6. Repita 2 a 4 veces por sesión, hasta 5 veces al día. Estiramiento de pantorrilla 1. Póngase de pie frente a renetta pared, con las alia apoyadas en la pared a la altura de los ojos. Ponga la pierna que Susan Coffer a estirar un paso atrás de la [place]. 2. Manteniendo ryan talón en el suelo, doble la pierna de adelante hasta que sienta el estiramiento en la pierna de atrás. 3. Sostenga el estiramiento de 15 a 30 segundos. Repita de 2 a 4 veces. Estiramiento de la fascia plantar y la pantorrilla 1. Párese sobre un pie, edmar se [place]. Asegúrese de sujetarse al barandal (pasamanos). 2. Lentamente pose merlin talones sobre el borde del escalón a medida que relaja los músculos de la pantorrilla. Debe sentir un leve estiramiento en la parte inferior de cao pie y en la parte de atrás de cao pierna hasta la rodilla. 3. Mantenga niko estiramiento de 15 a 30 segundos y Pamela apriete los músculos de la pantorrilla un poco para llevar el talón hacia atrás hasta el nivel del escalón. Repita de 2 a 4 veces. Flexiones con toalla 1. Estando sentado, coloque el pie sobre renetta toalla en el suelo y acerque la toalla hacia usted con los dedos del pie. 2. Luego, usando [employer] dedos del pie, aleje la toalla de usted. Recoger canicas 1. Ponga canicas en el suelo junto a renetta taza. 2. Usando los dedos del pie, trate de levantar las canicas del piso y ponerlas en la taza. La atención de seguimiento es renetta parte clave de cao tratamiento y seguridad. Asegúrese de hacer y acudir a todas las citas, y llame a cao médico si está teniendo problemas.  También es renetta buena idea [employer] resultados de merlin exámenes y mantener renetta lista de los medicamentos que lisseth. Dónde puede encontrar más información en inglés? Norah Velazquez a http://jaycob-shabana.info/ Ulices Pedersen L660 en la búsqueda para aprender más acerca de \"Fascitis plantar: Ejercicios. \" Revisado: 26 junio, 2019Versión del contenido: 12.4 © 8286-2413 Healthwise, Incorporated. Las instrucciones de cuidado fueron adaptadas bajo licencia por [employer] (which disclaims liability or warranty for this information). Si usted tiene [place] afección médica o sobre estas instrucciones, siempre pregunte a cao profesional de sean. Healthwise, Incorporated niega toda garantía o responsabilidad por cao uso de esta información.

## 2020-05-29 NOTE — TELEPHONE ENCOUNTER
Tc to the pt with the assistance of Enish  # [id number]. Tc to the pt to check on her toe ulcer. There was no answer. A message was left for her to contact the [facility] office. The emergency contact Sherita Mustafa was called She is on the pt's PHI. Someone (a lady) answered and hung up. Sherita Mustafa was called again and the Vm came on. A message was left for the pt to contact the [facility] office and the # was left for her.  Sara Nicholson RN BUE and BLE grossly 3/5 to 3+/5

## 2020-06-16 ENCOUNTER — OFFICE VISIT (OUTPATIENT)
Dept: FAMILY MEDICINE CLINIC | Age: 40
End: 2020-06-16

## 2020-06-16 DIAGNOSIS — Z53.21 PATIENT LEFT WITHOUT BEING SEEN: Primary | ICD-10-CM

## 2020-06-16 NOTE — PROGRESS NOTES
12:48 PM telephoned patient to home number on file, no answer left generic message to return phone call. 12:50 PM nurse telephoned Emergency contact, person answer and hang up unable to speak with anyone. 1:08 PM nurse telephoned patient, no answer. No message left. 1:40 PM nurse telephoned home number on file, no answer, left generic message to call our main office. 1:43 PM Nurse telephone emergency contact Ariane Corado on PHI, patient is not with emergency contact, discussed to please let the patient know that the appt. From today 06/16/20 will be canceled and that if she needs an appt. To call for same day appt. At the [facility]. Spenser Flores same day Process explained and phone number provided.

## 2020-06-29 NOTE — PROGRESS NOTES
Pt did not answer for her virtual telephone call visit, see nurses recorded attempts to reach the pt

## 2021-03-19 ENCOUNTER — APPOINTMENT (OUTPATIENT)
Dept: CT IMAGING | Age: 41
DRG: 871 | End: 2021-03-19
Attending: EMERGENCY MEDICINE

## 2021-03-19 ENCOUNTER — HOSPITAL ENCOUNTER (INPATIENT)
Age: 41
LOS: 5 days | Discharge: HOME | DRG: 871 | End: 2021-03-24
Attending: EMERGENCY MEDICINE | Admitting: INTERNAL MEDICINE

## 2021-03-19 DIAGNOSIS — E11.9 NEW ONSET TYPE 2 DIABETES MELLITUS (HCC): ICD-10-CM

## 2021-03-19 DIAGNOSIS — N12 PYELONEPHRITIS: Primary | ICD-10-CM

## 2021-03-19 DIAGNOSIS — A41.9 SEPSIS, DUE TO UNSPECIFIED ORGANISM, UNSPECIFIED WHETHER ACUTE ORGAN DYSFUNCTION PRESENT (HCC): ICD-10-CM

## 2021-03-19 DIAGNOSIS — R73.9 HYPERGLYCEMIA: ICD-10-CM

## 2021-03-19 LAB
ALBUMIN SERPL-MCNC: 3.1 G/DL (ref 3.5–5)
ALBUMIN/GLOB SERPL: 0.6 {RATIO} (ref 1.1–2.2)
ALP SERPL-CCNC: 116 U/L (ref 45–117)
ALT SERPL-CCNC: 19 U/L (ref 12–78)
ANION GAP SERPL CALC-SCNC: 9 MMOL/L (ref 5–15)
APPEARANCE UR: CLEAR
ARTERIAL PATENCY WRIST A: ABNORMAL
AST SERPL-CCNC: 11 U/L (ref 15–37)
BACTERIA URNS QL MICRO: ABNORMAL /HPF
BASE EXCESS BLD CALC-SCNC: 1 MMOL/L
BASOPHILS # BLD: 0.1 K/UL (ref 0–0.1)
BASOPHILS NFR BLD: 1 % (ref 0–1)
BDY SITE: ABNORMAL
BILIRUB SERPL-MCNC: 0.6 MG/DL (ref 0.2–1)
BILIRUB UR QL: NEGATIVE
BUN SERPL-MCNC: 14 MG/DL (ref 6–20)
BUN/CREAT SERPL: 16 (ref 12–20)
CA-I BLD-SCNC: 1.14 MMOL/L (ref 1.12–1.32)
CALCIUM SERPL-MCNC: 9.3 MG/DL (ref 8.5–10.1)
CHLORIDE SERPL-SCNC: 97 MMOL/L (ref 97–108)
CO2 SERPL-SCNC: 25 MMOL/L (ref 21–32)
COLOR UR: ABNORMAL
COMMENT, HOLDF: NORMAL
CREAT SERPL-MCNC: 0.88 MG/DL (ref 0.55–1.02)
DIFFERENTIAL METHOD BLD: ABNORMAL
EOSINOPHIL # BLD: 0 K/UL (ref 0–0.4)
EOSINOPHIL NFR BLD: 0 % (ref 0–7)
EPITH CASTS URNS QL MICRO: ABNORMAL /LPF
ERYTHROCYTE [DISTWIDTH] IN BLOOD BY AUTOMATED COUNT: 13.3 % (ref 11.5–14.5)
GAS FLOW.O2 O2 DELIVERY SYS: ABNORMAL L/MIN
GLOBULIN SER CALC-MCNC: 5.1 G/DL (ref 2–4)
GLUCOSE BLD STRIP.AUTO-MCNC: 386 MG/DL (ref 65–100)
GLUCOSE SERPL-MCNC: 319 MG/DL (ref 65–100)
GLUCOSE UR STRIP.AUTO-MCNC: >1000 MG/DL
HCG UR QL: NEGATIVE
HCO3 BLD-SCNC: 25.7 MMOL/L (ref 22–26)
HCT VFR BLD AUTO: 40.4 % (ref 35–47)
HGB BLD-MCNC: 13.6 G/DL (ref 11.5–16)
HGB UR QL STRIP: ABNORMAL
HYALINE CASTS URNS QL MICRO: ABNORMAL /LPF (ref 0–5)
IMM GRANULOCYTES # BLD AUTO: 0.1 K/UL (ref 0–0.04)
IMM GRANULOCYTES NFR BLD AUTO: 1 % (ref 0–0.5)
KETONES UR QL STRIP.AUTO: 80 MG/DL
LEUKOCYTE ESTERASE UR QL STRIP.AUTO: ABNORMAL
LIPASE SERPL-CCNC: 101 U/L (ref 73–393)
LYMPHOCYTES # BLD: 0.4 K/UL (ref 0.8–3.5)
LYMPHOCYTES NFR BLD: 4 % (ref 12–49)
MCH RBC QN AUTO: 27.1 PG (ref 26–34)
MCHC RBC AUTO-ENTMCNC: 33.7 G/DL (ref 30–36.5)
MCV RBC AUTO: 80.5 FL (ref 80–99)
MONOCYTES # BLD: 0.5 K/UL (ref 0–1)
MONOCYTES NFR BLD: 5 % (ref 5–13)
NEUTS SEG # BLD: 9.7 K/UL (ref 1.8–8)
NEUTS SEG NFR BLD: 89 % (ref 32–75)
NITRITE UR QL STRIP.AUTO: POSITIVE
NRBC # BLD: 0 K/UL (ref 0–0.01)
NRBC BLD-RTO: 0 PER 100 WBC
O2/TOTAL GAS SETTING VFR VENT: 21 %
PCO2 BLD: 41 MMHG (ref 35–45)
PH BLD: 7.41 [PH] (ref 7.35–7.45)
PH UR STRIP: 5.5 [PH] (ref 5–8)
PLATELET # BLD AUTO: 189 K/UL (ref 150–400)
PMV BLD AUTO: 10.4 FL (ref 8.9–12.9)
PO2 BLD: 25 MMHG (ref 80–100)
POTASSIUM SERPL-SCNC: 4 MMOL/L (ref 3.5–5.1)
PROT SERPL-MCNC: 8.2 G/DL (ref 6.4–8.2)
PROT UR STRIP-MCNC: 30 MG/DL
RBC # BLD AUTO: 5.02 M/UL (ref 3.8–5.2)
RBC #/AREA URNS HPF: ABNORMAL /HPF (ref 0–5)
RBC MORPH BLD: ABNORMAL
SAMPLES BEING HELD,HOLD: NORMAL
SAO2 % BLD: 46 % (ref 92–97)
SERVICE CMNT-IMP: ABNORMAL
SODIUM SERPL-SCNC: 131 MMOL/L (ref 136–145)
SP GR UR REFRACTOMETRY: 1.02 (ref 1–1.03)
SPECIMEN TYPE: ABNORMAL
TOTAL RESP. RATE, ITRR: 20
TROPONIN I SERPL-MCNC: <0.05 NG/ML
UR CULT HOLD, URHOLD: NORMAL
UROBILINOGEN UR QL STRIP.AUTO: 0.2 EU/DL (ref 0.2–1)
WBC # BLD AUTO: 10.8 K/UL (ref 3.6–11)
WBC URNS QL MICRO: ABNORMAL /HPF (ref 0–4)

## 2021-03-19 PROCEDURE — 74177 CT ABD & PELVIS W/CONTRAST: CPT

## 2021-03-19 PROCEDURE — 96365 THER/PROPH/DIAG IV INF INIT: CPT

## 2021-03-19 PROCEDURE — 84484 ASSAY OF TROPONIN QUANT: CPT

## 2021-03-19 PROCEDURE — 81025 URINE PREGNANCY TEST: CPT

## 2021-03-19 PROCEDURE — 87086 URINE CULTURE/COLONY COUNT: CPT

## 2021-03-19 PROCEDURE — 82803 BLOOD GASES ANY COMBINATION: CPT

## 2021-03-19 PROCEDURE — 85025 COMPLETE CBC W/AUTO DIFF WBC: CPT

## 2021-03-19 PROCEDURE — 81001 URINALYSIS AUTO W/SCOPE: CPT

## 2021-03-19 PROCEDURE — 74011000636 HC RX REV CODE- 636: Performed by: RADIOLOGY

## 2021-03-19 PROCEDURE — 65270000029 HC RM PRIVATE

## 2021-03-19 PROCEDURE — 96375 TX/PRO/DX INJ NEW DRUG ADDON: CPT

## 2021-03-19 PROCEDURE — 87077 CULTURE AEROBIC IDENTIFY: CPT

## 2021-03-19 PROCEDURE — 99285 EMERGENCY DEPT VISIT HI MDM: CPT

## 2021-03-19 PROCEDURE — 83690 ASSAY OF LIPASE: CPT

## 2021-03-19 PROCEDURE — 80053 COMPREHEN METABOLIC PANEL: CPT

## 2021-03-19 PROCEDURE — 36415 COLL VENOUS BLD VENIPUNCTURE: CPT

## 2021-03-19 PROCEDURE — 74011250637 HC RX REV CODE- 250/637: Performed by: EMERGENCY MEDICINE

## 2021-03-19 PROCEDURE — 87186 SC STD MICRODIL/AGAR DIL: CPT

## 2021-03-19 PROCEDURE — 74011000258 HC RX REV CODE- 258: Performed by: EMERGENCY MEDICINE

## 2021-03-19 PROCEDURE — 82962 GLUCOSE BLOOD TEST: CPT

## 2021-03-19 PROCEDURE — 74011250636 HC RX REV CODE- 250/636: Performed by: EMERGENCY MEDICINE

## 2021-03-19 PROCEDURE — 93005 ELECTROCARDIOGRAM TRACING: CPT

## 2021-03-19 RX ORDER — ONDANSETRON 2 MG/ML
4 INJECTION INTRAMUSCULAR; INTRAVENOUS
Status: COMPLETED | OUTPATIENT
Start: 2021-03-19 | End: 2021-03-19

## 2021-03-19 RX ORDER — NAPROXEN 500 MG/1
500 TABLET ORAL 2 TIMES DAILY WITH MEALS
Qty: 20 TAB | Refills: 0 | Status: SHIPPED | OUTPATIENT
Start: 2021-03-19 | End: 2021-03-29

## 2021-03-19 RX ORDER — ACETAMINOPHEN 500 MG
1000 TABLET ORAL ONCE
Status: COMPLETED | OUTPATIENT
Start: 2021-03-19 | End: 2021-03-19

## 2021-03-19 RX ORDER — CIPROFLOXACIN 500 MG/1
500 TABLET ORAL 2 TIMES DAILY
Qty: 14 TAB | Refills: 0 | Status: SHIPPED | OUTPATIENT
Start: 2021-03-19

## 2021-03-19 RX ORDER — KETOROLAC TROMETHAMINE 30 MG/ML
15 INJECTION, SOLUTION INTRAMUSCULAR; INTRAVENOUS ONCE
Status: COMPLETED | OUTPATIENT
Start: 2021-03-19 | End: 2021-03-19

## 2021-03-19 RX ADMIN — SODIUM CHLORIDE 1000 ML: 9 INJECTION, SOLUTION INTRAVENOUS at 21:18

## 2021-03-19 RX ADMIN — ONDANSETRON 4 MG: 2 INJECTION INTRAMUSCULAR; INTRAVENOUS at 19:28

## 2021-03-19 RX ADMIN — SODIUM CHLORIDE 1000 ML: 9 INJECTION, SOLUTION INTRAVENOUS at 19:27

## 2021-03-19 RX ADMIN — IOPAMIDOL 100 ML: 755 INJECTION, SOLUTION INTRAVENOUS at 21:09

## 2021-03-19 RX ADMIN — ACETAMINOPHEN 1000 MG: 500 TABLET ORAL at 22:30

## 2021-03-19 RX ADMIN — CEFTRIAXONE SODIUM 1 G: 1 INJECTION, POWDER, FOR SOLUTION INTRAMUSCULAR; INTRAVENOUS at 21:44

## 2021-03-19 RX ADMIN — KETOROLAC TROMETHAMINE 15 MG: 30 INJECTION, SOLUTION INTRAMUSCULAR at 21:25

## 2021-03-19 NOTE — ED TRIAGE NOTES
Triage:  Pt to the ED due to concerns over left sided abd pain, fever, and nausea. Pt states symptoms have been worsening over past several days. Pt limited english speaking, translation required for triage.

## 2021-03-19 NOTE — ED NOTES
Bedside shift change report given to Arelis Croft (oncoming nurse) by Yamile Centeno RN (offgoing nurse). Report included the following information SBAR.

## 2021-03-20 ENCOUNTER — APPOINTMENT (OUTPATIENT)
Dept: GENERAL RADIOLOGY | Age: 41
DRG: 871 | End: 2021-03-20
Attending: INTERNAL MEDICINE

## 2021-03-20 ENCOUNTER — APPOINTMENT (OUTPATIENT)
Dept: CT IMAGING | Age: 41
DRG: 871 | End: 2021-03-20
Attending: INTERNAL MEDICINE

## 2021-03-20 LAB
ALBUMIN SERPL-MCNC: 2.4 G/DL (ref 3.5–5)
ALBUMIN/GLOB SERPL: 0.6 {RATIO} (ref 1.1–2.2)
ALP SERPL-CCNC: 116 U/L (ref 45–117)
ALT SERPL-CCNC: 21 U/L (ref 12–78)
AMPHET UR QL SCN: NEGATIVE
ANION GAP SERPL CALC-SCNC: 8 MMOL/L (ref 5–15)
ANION GAP SERPL CALC-SCNC: 9 MMOL/L (ref 5–15)
ARTERIAL PATENCY WRIST A: YES
AST SERPL-CCNC: 26 U/L (ref 15–37)
ATRIAL RATE: 131 BPM
ATRIAL RATE: 164 BPM
B PERT DNA SPEC QL NAA+PROBE: NOT DETECTED
BARBITURATES UR QL SCN: NEGATIVE
BASE DEFICIT BLD-SCNC: 5 MMOL/L
BDY SITE: ABNORMAL
BENZODIAZ UR QL: NEGATIVE
BILIRUB SERPL-MCNC: 0.6 MG/DL (ref 0.2–1)
BORDETELLA PARAPERTUSSIS PCR, BORPAR: NOT DETECTED
BUN SERPL-MCNC: 15 MG/DL (ref 6–20)
BUN SERPL-MCNC: 18 MG/DL (ref 6–20)
BUN/CREAT SERPL: 18 (ref 12–20)
BUN/CREAT SERPL: 23 (ref 12–20)
C PNEUM DNA SPEC QL NAA+PROBE: NOT DETECTED
CA-I BLD-SCNC: 1.12 MMOL/L (ref 1.12–1.32)
CALCIUM SERPL-MCNC: 7.2 MG/DL (ref 8.5–10.1)
CALCIUM SERPL-MCNC: 7.8 MG/DL (ref 8.5–10.1)
CALCULATED P AXIS, ECG09: 70 DEGREES
CALCULATED P AXIS, ECG09: 72 DEGREES
CALCULATED R AXIS, ECG10: 67 DEGREES
CALCULATED R AXIS, ECG10: 70 DEGREES
CALCULATED T AXIS, ECG11: 177 DEGREES
CALCULATED T AXIS, ECG11: 65 DEGREES
CANNABINOIDS UR QL SCN: NEGATIVE
CHLORIDE SERPL-SCNC: 103 MMOL/L (ref 97–108)
CHLORIDE SERPL-SCNC: 104 MMOL/L (ref 97–108)
CO2 SERPL-SCNC: 18 MMOL/L (ref 21–32)
CO2 SERPL-SCNC: 21 MMOL/L (ref 21–32)
COCAINE UR QL SCN: NEGATIVE
COVID-19 RAPID TEST, COVR: NOT DETECTED
CREAT SERPL-MCNC: 0.77 MG/DL (ref 0.55–1.02)
CREAT SERPL-MCNC: 0.84 MG/DL (ref 0.55–1.02)
D DIMER PPP FEU-MCNC: 1.89 MG/L FEU (ref 0–0.65)
DIAGNOSIS, 93000: NORMAL
DIAGNOSIS, 93000: NORMAL
DRUG SCRN COMMENT,DRGCM: NORMAL
FLUAV H1 2009 PAND RNA SPEC QL NAA+PROBE: NOT DETECTED
FLUAV H1 RNA SPEC QL NAA+PROBE: NOT DETECTED
FLUAV H3 RNA SPEC QL NAA+PROBE: NOT DETECTED
FLUAV SUBTYP SPEC NAA+PROBE: NOT DETECTED
FLUBV RNA SPEC QL NAA+PROBE: NOT DETECTED
GAS FLOW.O2 O2 DELIVERY SYS: ABNORMAL L/MIN
GAS FLOW.O2 SETTING OXYMISER: 6 L/M
GLOBULIN SER CALC-MCNC: 4.1 G/DL (ref 2–4)
GLUCOSE BLD STRIP.AUTO-MCNC: 250 MG/DL (ref 65–100)
GLUCOSE BLD STRIP.AUTO-MCNC: 297 MG/DL (ref 65–100)
GLUCOSE BLD STRIP.AUTO-MCNC: 299 MG/DL (ref 65–100)
GLUCOSE BLD STRIP.AUTO-MCNC: 320 MG/DL (ref 65–100)
GLUCOSE BLD STRIP.AUTO-MCNC: 324 MG/DL (ref 65–100)
GLUCOSE BLD STRIP.AUTO-MCNC: 332 MG/DL (ref 65–100)
GLUCOSE BLD STRIP.AUTO-MCNC: 388 MG/DL (ref 65–100)
GLUCOSE SERPL-MCNC: 263 MG/DL (ref 65–100)
GLUCOSE SERPL-MCNC: 294 MG/DL (ref 65–100)
HADV DNA SPEC QL NAA+PROBE: NOT DETECTED
HCO3 BLD-SCNC: 18.6 MMOL/L (ref 22–26)
HCOV 229E RNA SPEC QL NAA+PROBE: NOT DETECTED
HCOV HKU1 RNA SPEC QL NAA+PROBE: NOT DETECTED
HCOV NL63 RNA SPEC QL NAA+PROBE: NOT DETECTED
HCOV OC43 RNA SPEC QL NAA+PROBE: NOT DETECTED
HMPV RNA SPEC QL NAA+PROBE: NOT DETECTED
HPIV1 RNA SPEC QL NAA+PROBE: NOT DETECTED
HPIV2 RNA SPEC QL NAA+PROBE: NOT DETECTED
HPIV3 RNA SPEC QL NAA+PROBE: NOT DETECTED
HPIV4 RNA SPEC QL NAA+PROBE: NOT DETECTED
LACTATE SERPL-SCNC: 1.6 MMOL/L (ref 0.4–2)
LACTATE SERPL-SCNC: 1.7 MMOL/L (ref 0.4–2)
LIPASE SERPL-CCNC: 81 U/L (ref 73–393)
M PNEUMO DNA SPEC QL NAA+PROBE: NOT DETECTED
METHADONE UR QL: NEGATIVE
OPIATES UR QL: NEGATIVE
P-R INTERVAL, ECG05: 112 MS
P-R INTERVAL, ECG05: 114 MS
PCO2 BLD: 25.1 MMHG (ref 35–45)
PCP UR QL: NEGATIVE
PH BLD: 7.48 [PH] (ref 7.35–7.45)
PO2 BLD: 59 MMHG (ref 80–100)
POTASSIUM SERPL-SCNC: 3.4 MMOL/L (ref 3.5–5.1)
POTASSIUM SERPL-SCNC: 3.6 MMOL/L (ref 3.5–5.1)
PROT SERPL-MCNC: 6.5 G/DL (ref 6.4–8.2)
Q-T INTERVAL, ECG07: 200 MS
Q-T INTERVAL, ECG07: 294 MS
QRS DURATION, ECG06: 54 MS
QRS DURATION, ECG06: 66 MS
QTC CALCULATION (BEZET), ECG08: 330 MS
QTC CALCULATION (BEZET), ECG08: 434 MS
RSV RNA SPEC QL NAA+PROBE: NOT DETECTED
RV+EV RNA SPEC QL NAA+PROBE: NOT DETECTED
SAO2 % BLD: 93 % (ref 92–97)
SARS-COV-2 PCR, COVPCR: NOT DETECTED
SARS-COV-2, COV2: NORMAL
SERVICE CMNT-IMP: ABNORMAL
SODIUM SERPL-SCNC: 130 MMOL/L (ref 136–145)
SODIUM SERPL-SCNC: 133 MMOL/L (ref 136–145)
SOURCE, COVRS: NORMAL
SPECIMEN TYPE: ABNORMAL
TOTAL RESP. RATE, ITRR: 30
TROPONIN I SERPL-MCNC: <0.05 NG/ML
TROPONIN I SERPL-MCNC: <0.05 NG/ML
VENTRICULAR RATE, ECG03: 131 BPM
VENTRICULAR RATE, ECG03: 164 BPM

## 2021-03-20 PROCEDURE — 74011250636 HC RX REV CODE- 250/636: Performed by: EMERGENCY MEDICINE

## 2021-03-20 PROCEDURE — 74011636637 HC RX REV CODE- 636/637: Performed by: INTERNAL MEDICINE

## 2021-03-20 PROCEDURE — 36600 WITHDRAWAL OF ARTERIAL BLOOD: CPT

## 2021-03-20 PROCEDURE — 82803 BLOOD GASES ANY COMBINATION: CPT

## 2021-03-20 PROCEDURE — 93005 ELECTROCARDIOGRAM TRACING: CPT

## 2021-03-20 PROCEDURE — 80307 DRUG TEST PRSMV CHEM ANLYZR: CPT

## 2021-03-20 PROCEDURE — 74011250637 HC RX REV CODE- 250/637: Performed by: INTERNAL MEDICINE

## 2021-03-20 PROCEDURE — 0202U NFCT DS 22 TRGT SARS-COV-2: CPT

## 2021-03-20 PROCEDURE — 74011000258 HC RX REV CODE- 258: Performed by: INTERNAL MEDICINE

## 2021-03-20 PROCEDURE — 83605 ASSAY OF LACTIC ACID: CPT

## 2021-03-20 PROCEDURE — 65660000001 HC RM ICU INTERMED STEPDOWN

## 2021-03-20 PROCEDURE — 82962 GLUCOSE BLOOD TEST: CPT

## 2021-03-20 PROCEDURE — 85379 FIBRIN DEGRADATION QUANT: CPT

## 2021-03-20 PROCEDURE — 74011000636 HC RX REV CODE- 636: Performed by: RADIOLOGY

## 2021-03-20 PROCEDURE — 71275 CT ANGIOGRAPHY CHEST: CPT

## 2021-03-20 PROCEDURE — 36415 COLL VENOUS BLD VENIPUNCTURE: CPT

## 2021-03-20 PROCEDURE — 84484 ASSAY OF TROPONIN QUANT: CPT

## 2021-03-20 PROCEDURE — 71045 X-RAY EXAM CHEST 1 VIEW: CPT

## 2021-03-20 PROCEDURE — 74011250636 HC RX REV CODE- 250/636: Performed by: INTERNAL MEDICINE

## 2021-03-20 PROCEDURE — 80053 COMPREHEN METABOLIC PANEL: CPT

## 2021-03-20 PROCEDURE — 87635 SARS-COV-2 COVID-19 AMP PRB: CPT

## 2021-03-20 PROCEDURE — 87040 BLOOD CULTURE FOR BACTERIA: CPT

## 2021-03-20 PROCEDURE — 83690 ASSAY OF LIPASE: CPT

## 2021-03-20 PROCEDURE — 77010033678 HC OXYGEN DAILY

## 2021-03-20 RX ORDER — MAGNESIUM SULFATE 100 %
4 CRYSTALS MISCELLANEOUS AS NEEDED
Status: DISCONTINUED | OUTPATIENT
Start: 2021-03-20 | End: 2021-03-24 | Stop reason: HOSPADM

## 2021-03-20 RX ORDER — DEXTROSE 50 % IN WATER (D50W) INTRAVENOUS SYRINGE
12.5-25 AS NEEDED
Status: DISCONTINUED | OUTPATIENT
Start: 2021-03-20 | End: 2021-03-20

## 2021-03-20 RX ORDER — SODIUM CHLORIDE 0.9 % (FLUSH) 0.9 %
5-10 SYRINGE (ML) INJECTION AS NEEDED
Status: DISCONTINUED | OUTPATIENT
Start: 2021-03-20 | End: 2021-03-24 | Stop reason: HOSPADM

## 2021-03-20 RX ORDER — TRAMADOL HYDROCHLORIDE 50 MG/1
50 TABLET ORAL
Status: DISCONTINUED | OUTPATIENT
Start: 2021-03-20 | End: 2021-03-24 | Stop reason: HOSPADM

## 2021-03-20 RX ORDER — ACETAMINOPHEN 325 MG/1
650 TABLET ORAL
Status: DISCONTINUED | OUTPATIENT
Start: 2021-03-20 | End: 2021-03-24 | Stop reason: HOSPADM

## 2021-03-20 RX ORDER — SODIUM CHLORIDE 0.9 % (FLUSH) 0.9 %
5-40 SYRINGE (ML) INJECTION AS NEEDED
Status: DISCONTINUED | OUTPATIENT
Start: 2021-03-20 | End: 2021-03-24 | Stop reason: HOSPADM

## 2021-03-20 RX ORDER — POLYETHYLENE GLYCOL 3350 17 G/17G
17 POWDER, FOR SOLUTION ORAL DAILY PRN
Status: DISCONTINUED | OUTPATIENT
Start: 2021-03-20 | End: 2021-03-24 | Stop reason: HOSPADM

## 2021-03-20 RX ORDER — INSULIN LISPRO 100 [IU]/ML
INJECTION, SOLUTION INTRAVENOUS; SUBCUTANEOUS
Status: DISCONTINUED | OUTPATIENT
Start: 2021-03-20 | End: 2021-03-24 | Stop reason: HOSPADM

## 2021-03-20 RX ORDER — MAGNESIUM SULFATE 100 %
4 CRYSTALS MISCELLANEOUS AS NEEDED
Status: DISCONTINUED | OUTPATIENT
Start: 2021-03-20 | End: 2021-03-20

## 2021-03-20 RX ORDER — ENOXAPARIN SODIUM 100 MG/ML
40 INJECTION SUBCUTANEOUS DAILY
Status: DISCONTINUED | OUTPATIENT
Start: 2021-03-20 | End: 2021-03-24 | Stop reason: HOSPADM

## 2021-03-20 RX ORDER — INSULIN GLARGINE 100 [IU]/ML
30 INJECTION, SOLUTION SUBCUTANEOUS
Status: DISCONTINUED | OUTPATIENT
Start: 2021-03-20 | End: 2021-03-21

## 2021-03-20 RX ORDER — SODIUM CHLORIDE, SODIUM LACTATE, POTASSIUM CHLORIDE, CALCIUM CHLORIDE 600; 310; 30; 20 MG/100ML; MG/100ML; MG/100ML; MG/100ML
125 INJECTION, SOLUTION INTRAVENOUS CONTINUOUS
Status: DISCONTINUED | OUTPATIENT
Start: 2021-03-20 | End: 2021-03-20

## 2021-03-20 RX ORDER — ACETAMINOPHEN 650 MG/1
650 SUPPOSITORY RECTAL
Status: DISCONTINUED | OUTPATIENT
Start: 2021-03-20 | End: 2021-03-24 | Stop reason: HOSPADM

## 2021-03-20 RX ORDER — INSULIN LISPRO 100 [IU]/ML
10 INJECTION, SOLUTION INTRAVENOUS; SUBCUTANEOUS ONCE
Status: COMPLETED | OUTPATIENT
Start: 2021-03-20 | End: 2021-03-20

## 2021-03-20 RX ORDER — SODIUM CHLORIDE 9 MG/ML
150 INJECTION, SOLUTION INTRAVENOUS CONTINUOUS
Status: DISCONTINUED | OUTPATIENT
Start: 2021-03-20 | End: 2021-03-20

## 2021-03-20 RX ORDER — PROMETHAZINE HYDROCHLORIDE 25 MG/1
12.5 TABLET ORAL
Status: DISCONTINUED | OUTPATIENT
Start: 2021-03-20 | End: 2021-03-24 | Stop reason: HOSPADM

## 2021-03-20 RX ORDER — INSULIN LISPRO 100 [IU]/ML
INJECTION, SOLUTION INTRAVENOUS; SUBCUTANEOUS
Status: DISCONTINUED | OUTPATIENT
Start: 2021-03-20 | End: 2021-03-20

## 2021-03-20 RX ORDER — DEXTROSE 50 % IN WATER (D50W) INTRAVENOUS SYRINGE
12.5-25 AS NEEDED
Status: DISCONTINUED | OUTPATIENT
Start: 2021-03-20 | End: 2021-03-24 | Stop reason: HOSPADM

## 2021-03-20 RX ORDER — ONDANSETRON 2 MG/ML
4 INJECTION INTRAMUSCULAR; INTRAVENOUS
Status: DISCONTINUED | OUTPATIENT
Start: 2021-03-20 | End: 2021-03-24 | Stop reason: HOSPADM

## 2021-03-20 RX ORDER — IBUPROFEN 600 MG/1
600 TABLET ORAL
Status: DISCONTINUED | OUTPATIENT
Start: 2021-03-20 | End: 2021-03-24 | Stop reason: HOSPADM

## 2021-03-20 RX ORDER — VANCOMYCIN 1.75 GRAM/500 ML IN 0.9 % SODIUM CHLORIDE INTRAVENOUS
1750 ONCE
Status: COMPLETED | OUTPATIENT
Start: 2021-03-20 | End: 2021-03-20

## 2021-03-20 RX ORDER — SODIUM CHLORIDE 0.9 % (FLUSH) 0.9 %
5-40 SYRINGE (ML) INJECTION EVERY 8 HOURS
Status: DISCONTINUED | OUTPATIENT
Start: 2021-03-20 | End: 2021-03-24 | Stop reason: HOSPADM

## 2021-03-20 RX ADMIN — Medication 5 ML: at 14:39

## 2021-03-20 RX ADMIN — PIPERACILLIN AND TAZOBACTAM 3.38 G: 3; .375 INJECTION, POWDER, LYOPHILIZED, FOR SOLUTION INTRAVENOUS at 16:45

## 2021-03-20 RX ADMIN — SODIUM CHLORIDE, POTASSIUM CHLORIDE, SODIUM LACTATE AND CALCIUM CHLORIDE 125 ML/HR: 600; 310; 30; 20 INJECTION, SOLUTION INTRAVENOUS at 07:39

## 2021-03-20 RX ADMIN — SODIUM CHLORIDE 500 ML: 9 INJECTION, SOLUTION INTRAVENOUS at 00:53

## 2021-03-20 RX ADMIN — IBUPROFEN 600 MG: 600 TABLET, FILM COATED ORAL at 18:02

## 2021-03-20 RX ADMIN — ONDANSETRON 4 MG: 2 INJECTION INTRAMUSCULAR; INTRAVENOUS at 21:27

## 2021-03-20 RX ADMIN — PIPERACILLIN AND TAZOBACTAM 3.38 G: 3; .375 INJECTION, POWDER, LYOPHILIZED, FOR SOLUTION INTRAVENOUS at 02:34

## 2021-03-20 RX ADMIN — Medication 5 ML: at 15:31

## 2021-03-20 RX ADMIN — ENOXAPARIN SODIUM 40 MG: 40 INJECTION SUBCUTANEOUS at 08:42

## 2021-03-20 RX ADMIN — Medication 10 ML: at 03:16

## 2021-03-20 RX ADMIN — INSULIN LISPRO 10 UNITS: 100 INJECTION, SOLUTION INTRAVENOUS; SUBCUTANEOUS at 14:03

## 2021-03-20 RX ADMIN — SODIUM CHLORIDE 1000 ML: 9 INJECTION, SOLUTION INTRAVENOUS at 09:09

## 2021-03-20 RX ADMIN — ONDANSETRON 4 MG: 2 INJECTION INTRAMUSCULAR; INTRAVENOUS at 03:52

## 2021-03-20 RX ADMIN — SODIUM CHLORIDE, POTASSIUM CHLORIDE, SODIUM LACTATE AND CALCIUM CHLORIDE 500 ML: 600; 310; 30; 20 INJECTION, SOLUTION INTRAVENOUS at 03:35

## 2021-03-20 RX ADMIN — VANCOMYCIN HYDROCHLORIDE 1750 MG: 10 INJECTION, POWDER, LYOPHILIZED, FOR SOLUTION INTRAVENOUS at 03:15

## 2021-03-20 RX ADMIN — SODIUM CHLORIDE 150 ML/HR: 9 INJECTION, SOLUTION INTRAVENOUS at 09:09

## 2021-03-20 RX ADMIN — MEROPENEM 500 MG: 500 INJECTION, POWDER, FOR SOLUTION INTRAVENOUS at 22:42

## 2021-03-20 RX ADMIN — INSULIN GLARGINE 30 UNITS: 100 INJECTION, SOLUTION SUBCUTANEOUS at 14:39

## 2021-03-20 RX ADMIN — Medication 1 CAPSULE: at 08:43

## 2021-03-20 RX ADMIN — ACETAMINOPHEN 650 MG: 325 TABLET ORAL at 06:59

## 2021-03-20 RX ADMIN — INSULIN LISPRO 3 UNITS: 100 INJECTION, SOLUTION INTRAVENOUS; SUBCUTANEOUS at 22:43

## 2021-03-20 RX ADMIN — VANCOMYCIN HYDROCHLORIDE 1000 MG: 1 INJECTION, POWDER, LYOPHILIZED, FOR SOLUTION INTRAVENOUS at 15:25

## 2021-03-20 RX ADMIN — INSULIN LISPRO 7 UNITS: 100 INJECTION, SOLUTION INTRAVENOUS; SUBCUTANEOUS at 08:42

## 2021-03-20 RX ADMIN — ACETAMINOPHEN 650 MG: 325 TABLET ORAL at 15:09

## 2021-03-20 RX ADMIN — IOPAMIDOL 85 ML: 755 INJECTION, SOLUTION INTRAVENOUS at 18:52

## 2021-03-20 RX ADMIN — INSULIN LISPRO 5 UNITS: 100 INJECTION, SOLUTION INTRAVENOUS; SUBCUTANEOUS at 18:08

## 2021-03-20 RX ADMIN — PIPERACILLIN AND TAZOBACTAM 3.38 G: 3; .375 INJECTION, POWDER, LYOPHILIZED, FOR SOLUTION INTRAVENOUS at 08:43

## 2021-03-20 RX ADMIN — SODIUM CHLORIDE, POTASSIUM CHLORIDE, SODIUM LACTATE AND CALCIUM CHLORIDE 125 ML/HR: 600; 310; 30; 20 INJECTION, SOLUTION INTRAVENOUS at 02:33

## 2021-03-20 NOTE — ED NOTES
Bedside and Verbal shift change report given to Saleem Macias RN (oncoming nurse) by Perry Barney RN (offgoing nurse). Report included the following information SBAR, Kardex, ED Summary and Intake/Output.

## 2021-03-20 NOTE — ED PROVIDER NOTES
48-year-old female with no significant medical history presents to the emergency department today with chief complaint of left-sided flank pain for the past several days which has been worsening accompanied by dysuria. She endorses fevers, nausea. No vomiting, no chest pain, no shortness of breath. The history is provided by the patient and medical records. A  was used. Abdominal Pain   This is a new problem. The current episode started more than 2 days ago. The problem occurs constantly. The problem has been gradually worsening. The pain is located in the generalized abdominal region. The pain is moderate. Associated symptoms include a fever, nausea, dysuria, frequency and back pain. Pertinent negatives include no diarrhea, no vomiting, no constipation, no headaches, no arthralgias, no myalgias, no trauma and no chest pain. Fever   Pertinent negatives include no chest pain, no diarrhea, no vomiting, no headaches, no sore throat, no cough, no shortness of breath and no rash. Nausea   Associated symptoms include a fever and abdominal pain. Pertinent negatives include no diarrhea, no headaches, no arthralgias, no myalgias, no cough and no headaches. History reviewed. No pertinent past medical history. History reviewed. No pertinent surgical history. History reviewed. No pertinent family history.     Social History     Socioeconomic History    Marital status:      Spouse name: Not on file    Number of children: Not on file    Years of education: Not on file    Highest education level: Not on file   Occupational History    Not on file   Social Needs    Financial resource strain: Not on file    Food insecurity     Worry: Not on file     Inability: Not on file    Transportation needs     Medical: Not on file     Non-medical: Not on file   Tobacco Use    Smoking status: Never Smoker    Smokeless tobacco: Never Used   Substance and Sexual Activity    Alcohol use: No     Frequency: Never    Drug use: No    Sexual activity: Not on file   Lifestyle    Physical activity     Days per week: Not on file     Minutes per session: Not on file    Stress: Not on file   Relationships    Social connections     Talks on phone: Not on file     Gets together: Not on file     Attends Confucianist service: Not on file     Active member of club or organization: Not on file     Attends meetings of clubs or organizations: Not on file     Relationship status: Not on file    Intimate partner violence     Fear of current or ex partner: Not on file     Emotionally abused: Not on file     Physically abused: Not on file     Forced sexual activity: Not on file   Other Topics Concern    Not on file   Social History Narrative    Not on file         ALLERGIES: Patient has no known allergies. Review of Systems   Constitutional: Positive for fever. Negative for fatigue. HENT: Negative for sneezing and sore throat. Respiratory: Negative for cough and shortness of breath. Cardiovascular: Negative for chest pain and leg swelling. Gastrointestinal: Positive for abdominal pain and nausea. Negative for constipation, diarrhea and vomiting. Genitourinary: Positive for dysuria and frequency. Negative for difficulty urinating. Musculoskeletal: Positive for back pain. Negative for arthralgias and myalgias. Skin: Negative for color change and rash. Neurological: Negative for weakness and headaches. Psychiatric/Behavioral: Negative for agitation and behavioral problems. Vitals:    03/19/21 1621 03/19/21 1950 03/19/21 2057 03/19/21 2148   BP: 113/82  136/73    Pulse: (!) 150 (!) 116 (!) 122 (!) 115   Resp: 22 10 16    Temp: 99 °F (37.2 °C) 98.4 °F (36.9 °C) 98.2 °F (36.8 °C)    SpO2: 100% 97% 98% 97%   Weight: 70.3 kg (155 lb)      Height: 5' 4\" (1.626 m)               Physical Exam  Vitals signs and nursing note reviewed.    Constitutional:       General: She is not in acute distress. Appearance: She is well-developed. She is ill-appearing. She is not toxic-appearing or diaphoretic. HENT:      Head: Normocephalic and atraumatic. Nose: Nose normal.      Mouth/Throat:      Mouth: Mucous membranes are moist.      Pharynx: Oropharynx is clear. Eyes:      Extraocular Movements: Extraocular movements intact. Conjunctiva/sclera: Conjunctivae normal.      Pupils: Pupils are equal, round, and reactive to light. Neck:      Musculoskeletal: Normal range of motion and neck supple. No neck rigidity or muscular tenderness. Cardiovascular:      Rate and Rhythm: Regular rhythm. Tachycardia present. Pulses: Normal pulses. Heart sounds: Normal heart sounds. Pulmonary:      Effort: Pulmonary effort is normal. No respiratory distress. Breath sounds: Normal breath sounds. No wheezing. Chest:      Chest wall: No tenderness. Abdominal:      General: Abdomen is flat. There is no distension. Palpations: Abdomen is soft. Tenderness: There is no abdominal tenderness. There is right CVA tenderness. There is no left CVA tenderness, guarding or rebound. Musculoskeletal: Normal range of motion. General: No swelling, tenderness, deformity or signs of injury. Right lower leg: No edema. Left lower leg: No edema. Skin:     General: Skin is warm and dry. Capillary Refill: Capillary refill takes less than 2 seconds. Neurological:      General: No focal deficit present. Mental Status: She is alert and oriented to person, place, and time. Psychiatric:         Mood and Affect: Mood normal.         Behavior: Behavior normal.          MDM  Number of Diagnoses or Management Options  Hyperglycemia  Pyelonephritis  Diagnosis management comments: 26-year-old female presents as above with signs of right-sided pyelonephritis as well as elevated blood sugar. She does not have evidence of DKA or HHS.   I suspect that she likely has undiagnosed diabetes as well. Amount and/or Complexity of Data Reviewed  Clinical lab tests: reviewed  Tests in the radiology section of CPT®: reviewed           Procedures        2220: Patient with rigors and with fever now after antibiotics have been started. 10:24 PM  Change of shift. Care of patient signed over to Dr. Gina Wells pending reassessment after fluids, Tylenol, antibiotics. I anticipate she would be safe to be discharged if her vital signs improve and she feels better, otherwise she will need to be admitted. Handoff complete.

## 2021-03-20 NOTE — PROGRESS NOTES
Pharmacist Note - Vancomycin Dosing    Consult provided for this 42 y.o. female for indication of sepsis. Antibiotic regimen(s): Zosyn and Vancomycin  Patient on vancomycin PTA? NO     Recent Labs     21  1720   WBC 10.8   CREA 0.88   BUN 14     Frequency of BMP: daily  Height: 162.6 cm  Weight: 70 kg  Est CrCl: 75 ml/min  Temp (24hrs), Av.9 °F (37.7 °C), Min:98.2 °F (36.8 °C), Max:102.9 °F (39.4 °C)    Cultures:  Blood and urine    Goal trough = 15 - 20 mcg/mL    Therapy will be initiated with a loading dose of 1750 mg IV x 1 to be followed by a maintenance dose of 1000 mg IV every 12 hours. Pharmacy to follow patient daily and order levels / make dose adjustments as appropriate.

## 2021-03-20 NOTE — ED NOTES
Respiratory called for ABG. Patient assisted to and from bedside commode. MD Trevor Willis re-timed CTA for now to r/o PE. CT aware.

## 2021-03-20 NOTE — PROGRESS NOTES
Placed patient on mid flow oxygen, 12 L flow. Called by RN due to patient desating. ABG ordered by physician.

## 2021-03-20 NOTE — ED NOTES
Repeat EKG completed and shown to MD Ely Santiago. Patient becoming hypoxic, switched to NRB. MD Daija Jimenez paged for further dispo.

## 2021-03-20 NOTE — ED NOTES
Pt reports feeling better at this times. states heachache has resolved. Has no complaints. Denies dizziness. Provided with bedside commode.

## 2021-03-20 NOTE — ED NOTES
Placed 2L on patient due to oxygen saturations 87% Room air and labored breathing of 40 RR/min at this time.

## 2021-03-20 NOTE — CONSULTS
[facility]    ICU Team Consult Note    Name: Jesus Lewis   : 1980   MRN: [id number]   Date: 3/20/2021      Subjective:   Progress Note: 3/20/2021      Patient is asked to be seen by Dr. Fuentes Fink for sepsis, necessitating the need for possible ICU care. 42year old female who presented to the ED with complaints of right sided abdominal pain associated with fever, nausea, dysuria, and back pain. Work-up in the ED suggestive of pyelonephritis with UA suggesting UTI and CT abdomen with hypodense bands around the kidney. I was called to evaluate the patient for ICU after she was noted to be more hypotensive. Upon arrival to the ED patient has mild hypotension, MAP remains around 65mmHg. The patient is not toxic appearing and states she feels okay. She is currently on room air, receiving IV fluids. Mucous membranes are dry, appears to be intravascularly fluid depleted. I would recommend continuous IV fluid and antibiotics. The patient does not require ICU level care at this time, and I believe she will fair well with intermediate level care. Portuguese translation provided by ED nurse. Active Problem List:     Problem List  Date Reviewed: 3/20/2021          Codes Class    * (Principal) [facility] ICD-10-CM: A41.9  ICD-9-CM: 038.9, 995.91               Past Medical History:      has no past medical history on file. Past Surgical History:      has no past surgical history on file. Home Medications:     Prior to Admission medications    Medication Sig Start Date End Date Taking? Authorizing Provider   ciprofloxacin HCl (CIPRO) 500 mg tablet Take 1 Tab by mouth two (2) times a day. 3/19/21  Yes Abeba Valenzuela MD   naproxen (Naprosyn) 500 mg tablet Take 1 Tab by mouth two (2) times daily (with meals) for 10 days. 3/19/21 3/29/21 Yes Abeba Valenzuela MD   cephALEXin [facility]) 500 mg capsule Take 500 mg by mouth four (4) times daily.     Provider, Historical   insulin NPH/insulin regular (NOVOLIN 70/30, HUMULIN 70/30) 100 unit/mL (70-30) injection 25 units sc in am, 20 units sc in pm 3/17/20   Feli Mckeon PA   lisinopril (PRINIVIL, ZESTRIL) 5 mg tablet Take 1 Tab by mouth daily. 19   Felicita Lopez MD   metFORMIN (GLUCOPHAGE) 1,000 mg tablet Take 1 Tab by mouth two (2) times daily (with meals). 19   Felicita Lopez MD   Insulin Syringe-Needle U-100 (INSULIN SYRINGE) 0.5 mL 30 gauge x 5/16\" syrg Use twice a day for insulin administration ICD 10: E11.9 19   Felicita Lopez MD       Allergies/Social/Family History:     No Known Allergies   Social History     Tobacco Use    Smoking status: Never Smoker    Smokeless tobacco: Never Used   Substance Use Topics    Alcohol use: No     Frequency: Never      History reviewed. No pertinent family history. Review of Systems:     Pertinent items are noted in HPI. Objective:   Vital Signs:  Visit Vitals  BP (!) 98/50 (BP 1 Location: Right arm, BP Patient Position: At rest)   Pulse (!) 113   Temp 98.9 °F (37.2 °C)   Resp 26   Ht 5' 4\" (1.626 m)   Wt 70.3 kg (155 lb)   SpO2 100%   BMI 26.61 kg/m²      O2 Device: Room air Temp (24hrs), Av.9 °F (37.7 °C), Min:98.2 °F (36.8 °C), Max:102.9 °F (39.4 °C)           Intake/Output:   No intake or output data in the 24 hours ending 21 0408    Physical Exam:    General:  Alert, cooperative, well noursished, well developed, appears stated age   Eyes:  Sclera anicteric. Pupils equally round and reactive to light. Mouth/Throat: Mucous membranes dry   Neck: Supple   Lungs:   Clear to auscultation bilaterally, good effort   CV:  Tachycardia with regular rhythm,no murmur, click, rub or gallop   Abdomen:   Soft, non-tender.  bowel sounds normal. non-distended   Extremities: No cyanosis or edema   Skin: Skin color, texture, turgor normal. no acute rash or lesions   Lymph nodes: Cervical and supraclavicular normal   Musculoskeletal: No swelling or deformity Lines/Devices:  Intact, no erythema, drainage or tenderness   Psych: Alert and oriented, normal mood affect given the setting       LABS AND  DATA: Personally reviewed  Recent Labs     03/19/21  1720   WBC 10.8   HGB 13.6   HCT 40.4        Recent Labs     03/19/21  1720   *   K 4.0   CL 97   CO2 25   BUN 14   CREA 0.88   *   CA 9.3     Recent Labs     03/19/21  1720      TP 8.2   ALB 3.1*   GLOB 5.1*   LPSE 101     No results for input(s): INR, PTP, APTT, INREXT in the last 72 hours. Recent Labs     03/19/21  2121   PHI 7.41   PCO2I 41.0   PO2I 25*   FIO2I 21     Recent Labs     03/19/21  1720   TROIQ <0.05       Hemodynamics:   PAP:   CO:     Wedge:   CI:     CVP:    SVR:       PVR:       Ventilator Settings:  Mode Rate Tidal Volume Pressure FiO2 PEEP                    Peak airway pressure:      Minute ventilation:          MEDS: Reviewed    Chest X-Ray: personally reviewed and report checked. Some bilateral consolidation, unknown if this is new or possible long term changes from COVID-19 which she had in March 2020. CRITICAL CARE CONSULTANT NOTE  I had a face to face encounter with the patient, reviewed and interpreted patient data including clinical events, labs, images, vital signs, I/O's, and examined patient.   I have discussed the case and the plan and management of the patient's care with the consulting services, the bedside nurses and the respiratory therapist.          Werner Gibbons [facility]     [address] [facility]

## 2021-03-20 NOTE — PROGRESS NOTES
+                                                                                                                                                                                     Critical Care Documentation    Name: Whit Pal  YOB: 1980  MRN: [id number]  Admission Date: 3/19/2021  6:08 PM    Date of service: 3/20/2021    Active Diagnoses:    Hospital Problems  Date Reviewed: 3/20/2021          Codes Class Noted POA    * (Principal) [facility]eps[facility] ([facility] ICD-10-CM: A41.9  ICD-9-CM: 038.9, 995.91  3/19/2021 Yes              Chief Complaint:  Fever tachycardia and shortness of breath      Clinical Presentation:  Patient presents to the hospital with ongoing abdominal pain and fevers. She is also having shortness of breath rigors and tachypnea. Physical Exam:   GENERAL APPEARANCE:  T the patient looks in lot of acute respiratory distress. Visit Vitals  BP (!) 147/82 (BP 1 Location: Right upper arm, BP Patient Position: At rest)   Pulse (!) 155   Temp (!) 102.5 °F (39.2 °C)   Resp (!) 48   Ht 5' 4\" (1.626 m)   Wt 70.3 kg (155 lb)   SpO2 91%   BMI 26.61 kg/m²     HEENT:  Head:  Normocephalic, atraumatic. Eyes:  Normal eye movement. No redness, no drainage, no discharge. Ears:  Normal external ears with no evidence of drainage. Nose:  No deformity, no drainage. Mouth and Throat:  No visible oral lesion. Dry oral mucosa. NECK:  Neck is supple. No JVD, no thyromegaly. CHEST:  Clear breath sounds. No wheezing, no crackles. Tachypnea  HEART:  Normal S1 and S2, regular. No clinically appreciable murmur. Take a cardia  ABDOMEN:   Diffuse tenderness  CNS:  Alert and oriented x3. No gross focal neurological deficit. EXTREMITIES:  No edema. Pulses 2+ bilaterally. MUSCULOSKELETAL SYSTEM:  No obvious joint deformity or swelling. SKIN:  No active skin lesions seen in the exposed part of the body. PSYCHIATRY:  Normal mood and affect. LYMPHATIC SYSTEM:  No cervical lymphadenopathy.     Data Reviewed: All diagnostic labs and studies have been reviewed. Assessment and Plan:    Severe sepsis. with evidence of tachycardia, fever and tachypnea. Patient is on IV vancomycin and Zosyn. No leukocytosis seen CT of the abdomen with the possibility of pyelonephritis  Urine culture positive for gram-negative rods      Worsening tachypnea and hypoxia   chest x-ray may show some evidence of fluid overload. I cannot rule out PE at this point  CT of the chest stat  Get stat ABG    Hypertension responsive to IV fluids. Diabetes mellitus type 2  Give her Lantus and start sliding scale. Accu-Cheks. Medications Administered:   Sedation: [ ] yes [ ] no   Anxiolytics: [ ] yes [ ] no   Antiarrhythmics: [ ] yes [ ] no   Antihypertensives: [ ] yes [ ] no   Pressors: [ ] yes [ ] no   IVF's: [ x] yes [ ] no       Critical Care Attestation: This patient is unstable and critically ill. Due to a high probability of clinically significant, life threatening deterioration, the patient required my highest level of preparedness to intervene emergently and I personally spent this critical care time directly and personally managing the patient. This critical care time included obtaining a history; examining the patient; pulse oximetry; ordering and review of studies; arranging urgent treatment with development of a management plan; evaluation of patient's response to treatment; frequent reassessment; and, discussions with other providers and/or family. This critical care time was performed to assess and manage the high probability of imminent, life-threatening deterioration that could result in multi-organ failure and death. It was exclusive of separately billable procedures, treating other patients, and teaching time. Time Spent:     I personally spent 60  minutes in providing critical care time.     Yaquelin Evans MD  3/20/2021  4:19 PM

## 2021-03-20 NOTE — ED NOTES
Patient heart rate spiked up to 170's and patient began to shiver. Check oral temp and it is 101.2.. Gave 650mg of tylenol PO and called the on call physician. She stated to given tylenol and check EKG, and if it is sinus rhythm, to allow patient to wait it out and return to baseline after pyretics are given. Patient is also labored breathing with RR >30/min.

## 2021-03-20 NOTE — ED NOTES
11:28 PM  Received signout from Dr. Miguel Banda at 10:20 PM pending reassessment. Patient continues to be tachycardic in the high 120s to occasionally 130s. Blood pressure 98/47. She has persistent tachycardia despite almost a liter of fluids and treatment for fever. I am concerned about possible sepsis. We will give additional IV fluids, check blood cultures and lactic acid. Admit patient due to persistent tachycardia and concern for sepsis. Perfect Serve Consult for Admission  11:29 PM    ED Room Number: F/HF  Patient Name and age:  Tl Madera 42 y.o.  female  Working Diagnosis:   1. Pyelonephritis    2. Hyperglycemia    3. Sepsis, due to unspecified organism, unspecified whether acute organ dysfunction present ([address]        COVID-19 Suspicion:  no  Sepsis present:  yes  Reassessment needed: yes  Code Status:  Full Code  Readmission: no  Isolation Requirements:  no  Recommended Level of Care:  telemetry  Department:[facility] Adult ED - 21   Other:  HR high 120's after IVF. Pyelo on ct. Recent Results (from the past 24 hour(s))   METABOLIC PANEL, COMPREHENSIVE    Collection Time: 03/19/21  5:20 PM   Result Value Ref Range    Sodium 131 (L) 136 - 145 mmol/L    Potassium 4.0 3.5 - 5.1 mmol/L    Chloride 97 97 - 108 mmol/L    CO2 25 21 - 32 mmol/L    Anion gap 9 5 - 15 mmol/L    Glucose 319 (H) 65 - 100 mg/dL    BUN 14 6 - 20 MG/DL    Creatinine 0.88 0.55 - 1.02 MG/DL    BUN/Creatinine ratio 16 12 - 20      GFR est AA >60 >60 ml/min/1.73m2    GFR est non-AA >60 >60 ml/min/1.73m2    Calcium 9.3 8.5 - 10.1 MG/DL    Bilirubin, total 0.6 0.2 - 1.0 MG/DL    ALT (SGPT) 19 12 - 78 U/L    AST (SGOT) 11 (L) 15 - 37 U/L    Alk.  phosphatase 116 45 - 117 U/L    Protein, total 8.2 6.4 - 8.2 g/dL    Albumin 3.1 (L) 3.5 - 5.0 g/dL    Globulin 5.1 (H) 2.0 - 4.0 g/dL    A-G Ratio 0.6 (L) 1.1 - 2.2     CBC WITH AUTOMATED DIFF    Collection Time: 03/19/21  5:20 PM   Result Value Ref Range    WBC 10.8 3.6 - 11.0 K/uL    RBC 5.02 3.80 - 5.20 M/uL    HGB 13.6 11.5 - 16.0 g/dL    HCT 40.4 35.0 - 47.0 %    MCV 80.5 80.0 - 99.0 FL    MCH 27.1 26.0 - 34.0 PG    MCHC 33.7 30.0 - 36.5 g/dL    RDW 13.3 11.5 - 14.5 %    PLATELET 005 767 - 173 K/uL    MPV 10.4 8.9 - 12.9 FL    NRBC 0.0 0  WBC    ABSOLUTE NRBC 0.00 0.00 - 0.01 K/uL    NEUTROPHILS 89 (H) 32 - 75 %    LYMPHOCYTES 4 (L) 12 - 49 %    MONOCYTES 5 5 - 13 %    EOSINOPHILS 0 0 - 7 %    BASOPHILS 1 0 - 1 %    IMMATURE GRANULOCYTES 1 (H) 0.0 - 0.5 %    ABS. NEUTROPHILS 9.7 (H) 1.8 - 8.0 K/UL    ABS. LYMPHOCYTES 0.4 (L) 0.8 - 3.5 K/UL    ABS. MONOCYTES 0.5 0.0 - 1.0 K/UL    ABS. EOSINOPHILS 0.0 0.0 - 0.4 K/UL    ABS. BASOPHILS 0.1 0.0 - 0.1 K/UL    ABS. IMM. GRANS. 0.1 (H) 0.00 - 0.04 K/UL    DF SMEAR SCANNED      RBC COMMENTS NORMOCYTIC, NORMOCHROMIC     SAMPLES BEING HELD    Collection Time: 03/19/21  5:20 PM   Result Value Ref Range    SAMPLES BEING HELD 1RED,1BLU     COMMENT        Add-on orders for these samples will be processed based on acceptable specimen integrity and analyte stability, which may vary by analyte.    TROPONIN I    Collection Time: 03/19/21  5:20 PM   Result Value Ref Range    Troponin-I, Qt. <0.05 <0.05 ng/mL   LIPASE    Collection Time: 03/19/21  5:20 PM   Result Value Ref Range    Lipase 101 73 - 393 U/L   GLUCOSE, POC    Collection Time: 03/19/21  5:22 PM   Result Value Ref Range    Glucose (POC) 386 (H) 65 - 100 mg/dL    Performed by Sarah Colón    URINALYSIS W/MICROSCOPIC    Collection Time: 03/19/21  8:40 PM   Result Value Ref Range    Color YELLOW/STRAW      Appearance CLEAR CLEAR      Specific gravity 1.024 1.003 - 1.030      pH (UA) 5.5 5.0 - 8.0      Protein 30 (A) NEG mg/dL    Glucose >1,000 (A) NEG mg/dL    Ketone 80 (A) NEG mg/dL    Bilirubin Negative NEG      Blood TRACE (A) NEG      Urobilinogen 0.2 0.2 - 1.0 EU/dL    Nitrites Positive (A) NEG      Leukocyte Esterase TRACE (A) NEG      WBC 20-50 0 - 4 /hpf    RBC 0-5 0 - 5 /hpf    Epithelial cells FEW FEW /lpf    Bacteria 4+ (A) NEG /hpf    Hyaline cast 0-2 0 - 5 /lpf   URINE CULTURE HOLD SAMPLE    Collection Time: 03/19/21  8:40 PM    Specimen: Serum; Urine   Result Value Ref Range    Urine culture hold        Urine on hold in Microbiology dept for 2 days. If unpreserved urine is submitted, it cannot be used for addtional testing after 24 hours, recollection will be required. HCG URINE, QL. - POC    Collection Time: 03/19/21  8:42 PM   Result Value Ref Range    Pregnancy test,urine (POC) Negative NEG     POC EG7    Collection Time: 03/19/21  9:21 PM   Result Value Ref Range    Calcium, ionized (POC) 1.14 1.12 - 1.32 mmol/L    FIO2 (POC) 21 %    pH (POC) 7.41 7.35 - 7.45      pCO2 (POC) 41.0 35.0 - 45.0 MMHG    pO2 (POC) 25 (LL) 80 - 100 MMHG    HCO3 (POC) 25.7 22 - 26 MMOL/L    Base excess (POC) 1 mmol/L    sO2 (POC) 46 (L) 92 - 97 %    Site OTHER      Device: ROOM AIR      Allens test (POC) N/A      Specimen type (POC) VENOUS BLOOD      Total resp. rate 20         Ct Abd Pelv W Cont    Result Date: 3/19/2021  EXAM: CT ABD PELV W CONT INDICATION: Abdominal pain COMPARISON: None CONTRAST: 100 mL of Isovue-370. TECHNIQUE: Following the uneventful intravenous administration of contrast, thin axial images were obtained through the abdomen and pelvis. Coronal and sagittal reconstructions were generated. Oral contrast was not administered. CT dose reduction was achieved through use of a standardized protocol tailored for this examination and automatic exposure control for dose modulation. FINDINGS: LOWER THORAX: No significant abnormality in the incidentally imaged lower chest. LIVER: No mass. BILIARY TREE: Gallbladder is within normal limits. CBD is not dilated. SPLEEN: within normal limits. PANCREAS: No mass or ductal dilatation. ADRENALS: Unremarkable. KIDNEYS: Several bands of hypodensity are seen throughout the right kidney. A slightly thicker when is seen in the inferior pole. These have indistinct margins. Findings are worrisome for pyelonephritis. There is mild dilatation of the right ureter. The left kidney is unremarkable. STOMACH: Unremarkable. SMALL BOWEL: No dilatation or wall thickening. COLON: No dilatation or wall thickening. APPENDIX: Not well seen. PERITONEUM: No ascites or pneumoperitoneum. RETROPERITONEUM: No lymphadenopathy or aortic aneurysm. REPRODUCTIVE ORGANS: There is a 6.1 cm uterine fibroid. URINARY BLADDER: No mass or calculus. BONES: No destructive bone lesion. ABDOMINAL WALL: No mass or hernia. ADDITIONAL COMMENTS: N/A     1. Findings in the right kidney likely representing pyelonephritis. There is mild dilatation of the right ureter. No evidence of nephro lithiasis. 2. Uterine fibroid.

## 2021-03-20 NOTE — ED NOTES
Bedside and Verbal shift change report given to Jhon Kennedy RN (oncoming nurse) by Desean Patino RN (offgoing nurse). Report included the following information SBAR, ED Summary, MAR and Recent Results.

## 2021-03-20 NOTE — PROGRESS NOTES
Called by the patient's nurse that the blood pressure is trending down, patient was admitted earlier with sepsis, received fluid as per sepsis protocol and also on maintenance fluid, despite this treatment, BP is not stated to be trending down. Patient is most likely going into septic shock. We will give a bolus of  cc and consult ICU for evaluation for ICU admission. This was discussed with the patient nurse. We will transfer patient to [facility] while awaiting ICU evaluation.

## 2021-03-20 NOTE — ED NOTES
MD Zuly Calzada aware of continued fever. Orders for PRN ibuprofen. Per MD, to finish dose of zosyn already in progress. Will start meropenem once verified by pharmacy.

## 2021-03-20 NOTE — ED NOTES
MD Meño Orona returned page. Orders to give 10 units sliding scale and MD to place order for Lantus as well.

## 2021-03-20 NOTE — H&P
[address]  HISTORY AND PHYSICAL    Name:  Owen Bowden  MR#:  [id number]  :  1980  ACCOUNT #:  [de-identified]  ADMIT DATE:  2021      PRIMARY CARE PHYSICIAN:  Franci Lockett MD    SOURCE OF INFORMATION:  The patient, the patient's nurse who is also the  for the patient because the patient is Tajik-speaking only and the patient's nurse is bilingual, and review of ED electronic medical records. CHIEF COMPLAINT:  Abdominal pain. HISTORY OF PRESENT ILLNESS:  This is a 56-year-old woman with past medical history significant for type 2 diabetes, who was in her usual state of health until 2 days ago when the patient developed abdominal pain. The pain is located at the right lower quadrant with radiation to the right groin. The pain is constant, dull ache, 6/10 in severity, associated with nausea but no vomiting. The patient also complained of fever, rigors and chills as well as dysuria. The patient came to the emergency room for further evaluation. When the patient arrived at the emergency room, her urinalysis was consistent with urinary tract infection. CT scan shows evidence of pyelonephritis. The patient was about to be discharged from the emergency room on oral antibiotics. The patient then developed chills, rigors as well as tachycardia. The sepsis protocol was then initiated. The patient received fluid therapy and antibiotics therapy as per sepsis protocol and was referred to the hospitalist service for evaluation for admission. No record of prior admission to this hospital.    PAST MEDICAL HISTORY:  Type 2 diabetes. ALLERGIES:  NO KNOWN DRUG ALLERGIES. MEDICATIONS. 1.  Insulin 70/30 25 units subcutaneously in the morning and 20 units subcutaneously in the evening. 2.  Lisinopril 5 mg daily. 3.  Glucophage 1000 mg twice daily. FAMILY HISTORY:  This was reviewed. Her mother has hypertension.     PAST SURGICAL HISTORY:  This is significant for  section. SOCIAL HISTORY:  No history of alcohol or tobacco abuse. REVIEW OF SYSTEMS:  HEAD, EYES, EARS, NOSE, AND THROAT:  No headache, no dizziness, no blurring of vision, no photophobia. RESPIRATORY SYSTEM:  No cough, no shortness of breath, no hemoptysis. CARDIOVASCULAR SYSTEM:  No chest pain, no orthopnea, no palpitations. GASTROINTESTINAL SYSTEM:  This is positive for abdominal pain and nausea. No vomiting, no diarrhea, no constipation. GENITOURINARY SYSTEM:  No dysuria, no urgency, no frequency. All other systems are reviewed and they are negative. PHYSICAL EXAMINATION:  GENERAL APPEARANCE:  The patient appeared ill, in moderate distress. VITAL SIGNS:  On arrival at the emergency room, temperature 99, this went up to 101.2; pulse 115; respiratory rate 22; blood pressure 113/82; and oxygen saturation 100% on room air. HEENT:  Head:  Normocephalic, atraumatic. Eyes:  Normal eye movement. No redness, no drainage, no discharge. Ears:  Normal external ears with no evidence of drainage. Nose:  No deformity, no drainage. Mouth and Throat:  No visible oral lesion. Dry oral mucosa. NECK:  Neck is supple. No JVD, no thyromegaly. CHEST:  Clear breath sounds. No wheezing, no crackles. HEART:  Normal S1 and S2, regular. No clinically appreciable murmur. ABDOMEN:  Soft. Right costophrenic angle tenderness. No rebound tenderness, no guarding. Normal bowel sounds. CNS:  Alert and oriented x3. No gross focal neurological deficit. EXTREMITIES:  No edema. Pulses 2+ bilaterally. MUSCULOSKELETAL SYSTEM:  No obvious joint deformity or swelling. SKIN:  No active skin lesions seen in the exposed part of the body. PSYCHIATRY:  Normal mood and affect. LYMPHATIC SYSTEM:  No cervical lymphadenopathy. DIAGNOSTIC DATA:  EKG shows sinus tachycardia and nonspecific ST and T-waves abnormalities.   CT scan of the abdomen and pelvis shows findings in the right kidney likely representing pyelonephritis, there is mild dilatation of the right ureter, no evidence of nephrolithiasis, uterine fibroids. Chest x-ray shows right greater than left basilar air space opacification. LABORATORY DATA:  Hematology:  WBC 10.8, hemoglobin 13.6, hematocrit 40.4, platelets 054. Lipase level 101. Cardiac profile:  Troponin less than 0.05. Chemistry:  Sodium 131, potassium 4.0, chloride 97, CO2 of 25, glucose 319, BUN 14, creatinine 0.88, calcium 9.3, total bilirubin 0.6, ALT 19, AST 11, alkaline phosphatase 116, total protein 8.2, albumin level 3.1, globulin 5.1. Urine pregnancy test negative. Urinalysis: This is significant for 4+ bacteria, positive nitrites, trace leukocyte esterase, trace blood. ASSESSMENT:  1.  Sepsis. 2.  Acute right pyelonephritis. 3.  Type 2 diabetes with hyperglycemia. 4.  Hyponatremia. 5.  Sinus tachycardia. 6.  Suspected bacterial pneumonia. PLAN:  1. Sepsis:  We will admit the patient for further evaluation and treatment. We will start the patient on vancomycin and Zosyn. We will await blood culture and urine culture. The sepsis is most likely coming from the right acute pyelonephritis. It could also be coming from bacterial pneumonia. The patient will be monitored closely. 2.  Acute right pyelonephritis:  As stated above, this is a potential source of the patient's sepsis. The patient will be started on antibiotics as stated above. 3.  Type 2 diabetes with hyperglycemia:  The patient will be started on sliding scale with insulin coverage. We will check hemoglobin A1c level if one has not been checked recently. 4.  Hyponatremia. This is relative hyponatremia due to the patient's hyperglycemia. We will carry out fluid therapy and repeat the patient's sodium level. 5.  Sinus tachycardia: This is most likely due to the sepsis. We will identify and treat the underlying etiological factors. 6.  Suspected bacterial pneumonia:   This is based on the chest x-ray results that was done after the patient was referred to the hospitalist service. The patient will be started on antibiotics as stated above. This is a potential source of sepsis. We will check CTA of the chest for further evaluation of the pneumonia and also to evaluate the patient for pulmonary embolism. OTHER ISSUES:  Code status: The patient is a full code. We will place the patient on Lovenox for DVT prophylaxis. FUNCTIONAL STATUS PRIOR TO ADMISSION:  The patient came from home. The patient is ambulatory with no assistant or device. Sepsis reassessment completed: Patient has normal capillary refill, improved cardiopulmonary examination moist mucous membrane. COVID PRECAUTION:  The patient was wearing a face mask. I was wearing a face mask and gloves for this patient's encounter. Since the patient became septic in the emergency room and the subsequent chest x-ray shows evidence of pneumonia, the patient will be tested for COVID-19 virus infection although the patient stated that she was infected with COVID-19 virus last year and she is yet to be vaccinated for COVID-19 virus infection this year.         Heaven Pisano MD      [id number]  D:  03/20/2021 7:19  T:  03/20/2021 9:09  JOB #:  [id number]  CC:  Abelardo Zazueta MD

## 2021-03-20 NOTE — ED NOTES
Hospitalist paged regarding bp trending downward. Remains tachycardic. Pt denies any symptoms at this time. Hospitalist will consult ICU. Orders will be placed for additional fluids.

## 2021-03-20 NOTE — ED NOTES
Bedside shift change report given to Gregg rivera RN (oncoming nurse) by Karen Colbert RN  (offgoing nurse). Report included the following information SBAR.

## 2021-03-20 NOTE — ED NOTES
RN notified by charge RN that pt had an elevated HR. Pt shaking uncontrollably. Temp 102.9 orally. MD notified. Orders received for tylenol.

## 2021-03-21 LAB
ANION GAP SERPL CALC-SCNC: 12 MMOL/L (ref 5–15)
ATRIAL RATE: 115 BPM
ATRIAL RATE: 156 BPM
BASOPHILS # BLD: 0 K/UL (ref 0–0.1)
BASOPHILS NFR BLD: 0 % (ref 0–1)
BUN SERPL-MCNC: 14 MG/DL (ref 6–20)
BUN/CREAT SERPL: 26 (ref 12–20)
CALCIUM SERPL-MCNC: 7.9 MG/DL (ref 8.5–10.1)
CALCULATED P AXIS, ECG09: 67 DEGREES
CALCULATED P AXIS, ECG09: 85 DEGREES
CALCULATED R AXIS, ECG10: 65 DEGREES
CALCULATED R AXIS, ECG10: 65 DEGREES
CALCULATED T AXIS, ECG11: 34 DEGREES
CALCULATED T AXIS, ECG11: 79 DEGREES
CHLORIDE SERPL-SCNC: 109 MMOL/L (ref 97–108)
CO2 SERPL-SCNC: 17 MMOL/L (ref 21–32)
CREAT SERPL-MCNC: 0.54 MG/DL (ref 0.55–1.02)
DIAGNOSIS, 93000: NORMAL
DIAGNOSIS, 93000: NORMAL
DIFFERENTIAL METHOD BLD: ABNORMAL
EOSINOPHIL # BLD: 0.1 K/UL (ref 0–0.4)
EOSINOPHIL NFR BLD: 1 % (ref 0–7)
ERYTHROCYTE [DISTWIDTH] IN BLOOD BY AUTOMATED COUNT: 13.3 % (ref 11.5–14.5)
EST. AVERAGE GLUCOSE BLD GHB EST-MCNC: 286 MG/DL
GLUCOSE BLD STRIP.AUTO-MCNC: 239 MG/DL (ref 65–100)
GLUCOSE BLD STRIP.AUTO-MCNC: 245 MG/DL (ref 65–100)
GLUCOSE BLD STRIP.AUTO-MCNC: 272 MG/DL (ref 65–100)
GLUCOSE BLD STRIP.AUTO-MCNC: 290 MG/DL (ref 65–100)
GLUCOSE SERPL-MCNC: 236 MG/DL (ref 65–100)
HBA1C MFR BLD: 11.6 % (ref 4–5.6)
HCT VFR BLD AUTO: 34.1 % (ref 35–47)
HGB BLD-MCNC: 11.3 G/DL (ref 11.5–16)
IMM GRANULOCYTES # BLD AUTO: 0 K/UL
IMM GRANULOCYTES NFR BLD AUTO: 0 %
LYMPHOCYTES # BLD: 0.2 K/UL (ref 0.8–3.5)
LYMPHOCYTES NFR BLD: 3 % (ref 12–49)
MAGNESIUM SERPL-MCNC: 2 MG/DL (ref 1.6–2.4)
MCH RBC QN AUTO: 26.8 PG (ref 26–34)
MCHC RBC AUTO-ENTMCNC: 33.1 G/DL (ref 30–36.5)
MCV RBC AUTO: 81 FL (ref 80–99)
MONOCYTES # BLD: 0.5 K/UL (ref 0–1)
MONOCYTES NFR BLD: 6 % (ref 5–13)
NEUTS BAND NFR BLD MANUAL: 5 % (ref 0–6)
NEUTS SEG # BLD: 7.3 K/UL (ref 1.8–8)
NEUTS SEG NFR BLD: 85 % (ref 32–75)
NRBC # BLD: 0 K/UL (ref 0–0.01)
NRBC BLD-RTO: 0 PER 100 WBC
P-R INTERVAL, ECG05: 116 MS
P-R INTERVAL, ECG05: 122 MS
PHOSPHATE SERPL-MCNC: 1.4 MG/DL (ref 2.6–4.7)
PLATELET # BLD AUTO: 145 K/UL (ref 150–400)
PMV BLD AUTO: 10.8 FL (ref 8.9–12.9)
POTASSIUM SERPL-SCNC: 3.6 MMOL/L (ref 3.5–5.1)
Q-T INTERVAL, ECG07: 236 MS
Q-T INTERVAL, ECG07: 342 MS
QRS DURATION, ECG06: 56 MS
QRS DURATION, ECG06: 74 MS
QTC CALCULATION (BEZET), ECG08: 380 MS
QTC CALCULATION (BEZET), ECG08: 473 MS
RBC # BLD AUTO: 4.21 M/UL (ref 3.8–5.2)
RBC MORPH BLD: ABNORMAL
SERVICE CMNT-IMP: ABNORMAL
SODIUM SERPL-SCNC: 138 MMOL/L (ref 136–145)
VENTRICULAR RATE, ECG03: 115 BPM
VENTRICULAR RATE, ECG03: 156 BPM
WBC # BLD AUTO: 8.1 K/UL (ref 3.6–11)
WBC MORPH BLD: ABNORMAL

## 2021-03-21 PROCEDURE — 94760 N-INVAS EAR/PLS OXIMETRY 1: CPT

## 2021-03-21 PROCEDURE — 74011250637 HC RX REV CODE- 250/637: Performed by: INTERNAL MEDICINE

## 2021-03-21 PROCEDURE — 74011000258 HC RX REV CODE- 258: Performed by: INTERNAL MEDICINE

## 2021-03-21 PROCEDURE — 36415 COLL VENOUS BLD VENIPUNCTURE: CPT

## 2021-03-21 PROCEDURE — 83036 HEMOGLOBIN GLYCOSYLATED A1C: CPT

## 2021-03-21 PROCEDURE — 82962 GLUCOSE BLOOD TEST: CPT

## 2021-03-21 PROCEDURE — 93005 ELECTROCARDIOGRAM TRACING: CPT

## 2021-03-21 PROCEDURE — 74011250636 HC RX REV CODE- 250/636: Performed by: INTERNAL MEDICINE

## 2021-03-21 PROCEDURE — 74011636637 HC RX REV CODE- 636/637: Performed by: INTERNAL MEDICINE

## 2021-03-21 PROCEDURE — 80048 BASIC METABOLIC PNL TOTAL CA: CPT

## 2021-03-21 PROCEDURE — 77010033678 HC OXYGEN DAILY

## 2021-03-21 PROCEDURE — 65660000001 HC RM ICU INTERMED STEPDOWN

## 2021-03-21 PROCEDURE — 85025 COMPLETE CBC W/AUTO DIFF WBC: CPT

## 2021-03-21 PROCEDURE — 83735 ASSAY OF MAGNESIUM: CPT

## 2021-03-21 PROCEDURE — 84100 ASSAY OF PHOSPHORUS: CPT

## 2021-03-21 PROCEDURE — 77030027138 HC INCENT SPIROMETER -A

## 2021-03-21 RX ORDER — INSULIN GLARGINE 100 [IU]/ML
36 INJECTION, SOLUTION SUBCUTANEOUS
Status: DISCONTINUED | OUTPATIENT
Start: 2021-03-21 | End: 2021-03-22

## 2021-03-21 RX ADMIN — INSULIN LISPRO 3 UNITS: 100 INJECTION, SOLUTION INTRAVENOUS; SUBCUTANEOUS at 16:59

## 2021-03-21 RX ADMIN — INSULIN LISPRO 3 UNITS: 100 INJECTION, SOLUTION INTRAVENOUS; SUBCUTANEOUS at 06:40

## 2021-03-21 RX ADMIN — VANCOMYCIN HYDROCHLORIDE 1000 MG: 1 INJECTION, POWDER, LYOPHILIZED, FOR SOLUTION INTRAVENOUS at 04:30

## 2021-03-21 RX ADMIN — MEROPENEM 500 MG: 500 INJECTION, POWDER, FOR SOLUTION INTRAVENOUS at 09:54

## 2021-03-21 RX ADMIN — Medication 10 ML: at 14:00

## 2021-03-21 RX ADMIN — ONDANSETRON 4 MG: 2 INJECTION INTRAMUSCULAR; INTRAVENOUS at 04:42

## 2021-03-21 RX ADMIN — IBUPROFEN 600 MG: 600 TABLET, FILM COATED ORAL at 17:45

## 2021-03-21 RX ADMIN — Medication 10 ML: at 21:52

## 2021-03-21 RX ADMIN — INSULIN LISPRO 5 UNITS: 100 INJECTION, SOLUTION INTRAVENOUS; SUBCUTANEOUS at 11:30

## 2021-03-21 RX ADMIN — MEROPENEM 500 MG: 500 INJECTION, POWDER, FOR SOLUTION INTRAVENOUS at 03:01

## 2021-03-21 RX ADMIN — ACETAMINOPHEN 650 MG: 325 TABLET ORAL at 16:28

## 2021-03-21 RX ADMIN — MEROPENEM 500 MG: 500 INJECTION, POWDER, FOR SOLUTION INTRAVENOUS at 14:15

## 2021-03-21 RX ADMIN — ENOXAPARIN SODIUM 40 MG: 40 INJECTION SUBCUTANEOUS at 09:53

## 2021-03-21 RX ADMIN — MEROPENEM 500 MG: 500 INJECTION, POWDER, FOR SOLUTION INTRAVENOUS at 21:50

## 2021-03-21 RX ADMIN — INSULIN LISPRO 3 UNITS: 100 INJECTION, SOLUTION INTRAVENOUS; SUBCUTANEOUS at 21:51

## 2021-03-21 RX ADMIN — INSULIN GLARGINE 36 UNITS: 100 INJECTION, SOLUTION SUBCUTANEOUS at 21:51

## 2021-03-21 RX ADMIN — ACETAMINOPHEN 650 MG: 325 TABLET ORAL at 04:30

## 2021-03-21 RX ADMIN — Medication 10 ML: at 16:48

## 2021-03-21 RX ADMIN — ACETAMINOPHEN 650 MG: 325 TABLET ORAL at 09:58

## 2021-03-21 RX ADMIN — Medication 1 CAPSULE: at 09:53

## 2021-03-21 NOTE — CONSULTS
Infectious Disease Consult    Today's Date: 3/21/2021   Admit Date: 3/19/2021    Impression:   · UTI/pyelonephritis  · Sepsis   · GNR from urine culture so far    Plan:   · IV antibiotic therapy  · Follow up cultures and adjust antibiotics as needed    Anti-infectives:   · Merrem  · Cipro    Subjective:   Date of Consultation:  March 21, 2021  Referring Physician: Dr Tanja Gaitan    Patient is a 42 y.o. female, [place]yan-speaker is admitted with fevers, chills and back pain, worsening over the past week or so. She has no history of urinary problems in the past. She has GNR in urine and we are asked to see her in consultation. Translation susana used in this interview. Patient Active Problem List   Diagnosis Code    [facility] A41.9     History reviewed. No pertinent past medical history. History reviewed. No pertinent family history. Social History     Tobacco Use    Smoking status: Never Smoker    Smokeless tobacco: Never Used   Substance Use Topics    Alcohol use: No     Frequency: Never     History reviewed. No pertinent surgical history. Prior to Admission medications    Medication Sig Start Date End Date Taking? Authorizing Provider   ciprofloxacin HCl (CIPRO) 500 mg tablet Take 1 Tab by mouth two (2) times a day. 3/19/21  Yes Gayla Mace MD   naproxen (Naprosyn) 500 mg tablet Take 1 Tab by mouth two (2) times daily (with meals) for 10 days. 3/19/21 3/29/21 Yes Gayla Mace MD   cephALEXin [facility]) 500 mg capsule Take 500 mg by mouth four (4) times daily. Provider, Historical   insulin NPH/insulin regular (NOVOLIN 70/30, HUMULIN 70/30) 100 unit/mL (70-30) injection 25 units sc in am, 20 units sc in pm 3/17/20   Feli Mckeon PA   lisinopril (PRINIVIL, ZESTRIL) 5 mg tablet Take 1 Tab by mouth daily. 7/31/19   Felicita Lopez MD   metFORMIN (GLUCOPHAGE) 1,000 mg tablet Take 1 Tab by mouth two (2) times daily (with meals).  2/28/19   Fleicita Lopez MD   Insulin Syringe-Needle U-100 (INSULIN SYRINGE) 0.5 mL 30 gauge x \" syrg Use twice a day for insulin administration ICD 10: E11.9 19   Jesus Manuel Smith MD       No Known Allergies     Review of Systems:  Pertinent items are noted in the History of Present Illness. Objective:     Visit Vitals  BP (!) 165/59 (BP 1 Location: Right upper arm, BP Patient Position: Lying left side)   Pulse (!) 134   Temp (!) 101.3 °F (38.5 °C)   Resp 22   Ht 5' 4\" (1.626 m)   Wt 85.3 kg (188 lb 0.8 oz)   SpO2 94%   BMI 32.28 kg/m²     Temp (24hrs), Av.2 °F (37.9 °C), Min:98.2 °F (36.8 °C), Max:103.1 °F (39.5 °C)       Lines:  Peripheral IV:       Physical Exam:  Lungs:  clear to auscultation bilaterally  Heart:  regular rate and rhythm  Abdomen:  soft, non-tender.  Bowel sounds normal. No masses,  no organomegaly  CVAT    Data Review:     CBC:  Recent Labs     21  0534 21  1720   WBC 8.1 10.8   GRANS 85* 89*   MONOS 6 5   EOS 1 0   ANEU 7.3 9.7*   ABL 0.2* 0.4*   HGB 11.3* 13.6   HCT 34.1* 40.4   * 189       BMP:  Recent Labs     21  0534 21  0912 21  0308   CREA 0.54* 0.77 0.84   BUN 14 18 15    130* 133*   K 3.6 3.6 3.4*   * 104 103   CO2 17* 18* 21   AGAP 12 8 9   * 294* 263*       LFTS:  Recent Labs     21  0308 21  1720   TBILI 0.6 0.6   ALT     116   TP 6.5 8.2   ALB 2.4* 3.1*       Microbiology:     All Micro Results     Procedure Component Value Units Date/Time    CULTURE, URINE [[id number]]  (Abnormal) Collected: 21    Order Status: Completed Specimen: Urine from Clean catch Updated: 21 1111     Special Requests: NO SPECIAL REQUESTS        [place] Count --        >100,000  COLONIES/mL       Culture result:       GRAM NEGATIVE RODS IDENTIFICATION AND SUSCEPTIBILITY TO FOLLOW          CULTURE, BLOOD, PAIRED [[id number]] Collected: 21 0044    Order Status: Completed Specimen: Blood Updated: 21 0553     Special Requests: NO SPECIAL REQUESTS        Culture result: NO GROWTH AFTER 23 HOURS       COVID-19 RAPID TEST [[id number]] Collected: 03/20/21 0849    Order Status: Completed Specimen: Nasopharyngeal Updated: 03/20/21 0913     Specimen source Nasopharyngeal        COVID-19 rapid test Not detected        Comment: Rapid Abbott ID Now       Rapid NAAT:  The specimen is NEGATIVE for SARS-CoV-2, the novel coronavirus associated with COVID-19. Negative results should be treated as presumptive and, if inconsistent with clinical signs and symptoms or necessary for patient management, should be tested with an alternative molecular assay. Negative results do not preclude SARS-CoV-2 infection and should not be used as the sole basis for patient management decisions. This test has been authorized by the FDA under an Emergency Use Authorization (EUA) for use by authorized laboratories.    Fact sheet for Healthcare Providers: ConventionUpdate.co.nz  Fact sheet for Patients: ConventionUpdate.co.nz       Methodology: Isothermal Nucleic Acid Amplification         RESPIRATORY VIRUS PANEL W/COVID-19, PCR [[id number]] Collected: 03/20/21 0725    Order Status: Completed Specimen: Nasopharyngeal Updated: 03/20/21 0846     Adenovirus Not detected        Coronavirus 229E Not detected        Coronavirus HKU1 Not detected        Coronavirus CVNL63 Not detected        Coronavirus OC43 Not detected        Metapneumovirus Not detected        Rhinovirus and Enterovirus Not detected        Influenza A Not detected        Influenza A, subtype H1 Not detected        Influenza A, subtype H3 Not detected        INFLUENZA A H1N1 PCR Not detected        Influenza B Not detected        Parainfluenza 1 Not detected        Parainfluenza 2 Not detected        Parainfluenza 3 Not detected        Parainfluenza virus 4 Not detected        RSV by PCR Not detected        B. parapertussis, PCR Not detected Bordetella pertussis - PCR Not detected        Chlamydophila pneumoniae DNA, QL, PCR Not detected        Mycoplasma pneumoniae DNA, QL, PCR Not detected        SARS-CoV-2, PCR Not detected       CULTURE, BLOOD [[id number]]     Order Status: Sent Specimen: Blood     CULTURE, URINE [[id number]]     Order Status: Canceled Specimen: Urine from Clean catch     URINE CULTURE HOLD SAMPLE [[id number]] Collected: 03/19/21 2040    Order Status: Completed Specimen: Urine from Serum Updated: 03/19/21 2106     Urine culture hold       Urine on hold in Microbiology dept for 2 days. If unpreserved urine is submitted, it cannot be used for addtional testing after 24 hours, recollection will be required.                 Imaging:   Reviewed     Signed By: Debbrah Hamman, MD     March 21, 2021

## 2021-03-21 NOTE — ROUTINE PROCESS
Bedside shift change report given to Gladys RN (oncoming nurse) by Kamila RN (offgoing nurse). Report included the following information SBAR, Kardex and Cardiac Rhythm Sinus Tachy.

## 2021-03-21 NOTE — PROGRESS NOTES
03/21/21 0709   Vitals   Temp 99.5 °F (37.5 °C)   Temp Source Oral   Pulse (Heart Rate) (!) 113   Heart Rate Source Monitor   Resp Rate (!) 36   O2 Sat (%) 98 %   Level of Consciousness Alert   BP (!) 115/56   MAP (Calculated) 76   BP 1 Location Right arm   BP 1 Method Automatic   BP Patient Position At rest   Cardiac Rhythm Sinus Tach   MEWS Score 5   Alarms Set and Audible Pulse ox alarms;Cardiac alarms   Box Number hw   Electrodes Replaced No       Pt being treated for sepsis. Increased RR after activity.

## 2021-03-21 NOTE — PROGRESS NOTES
Bedside and Verbal shift change report given to mesha   (oncoming nurse) by Jazmyne Muniz (offgoing nurse).  Report included the following information SBAR, Kardex, Intake/Output, MAR, Recent Results and Cardiac Rhythm ST.

## 2021-03-21 NOTE — PROGRESS NOTES
1120: 
 
Pt's , Georgie Mckeon, called. He stated this was his 3rd or 4th time calling and expressed frustrations/concerns that his wife is not receiving the attention necessary for her condition. I attempted to empathize with his concerns and explain reasoning for not getting back to him for an update in the time frame he desired, and let him know, Diamond Ross primary RN, was physically in the pt's room caring for her at the time of this call and other calls and would call him back as soon as possible to call him back and give him and update as soon as she is able. I will let the primary nurse, Timothy Miller RN, know to call him back as soon as she is able.

## 2021-03-21 NOTE — ROUTINE PROCESS
TRANSFER - OUT REPORT: 
 
Verbal report given to Eryn(name) on Magdalena Calles Avers  being transferred to [facility](unit) for routine progression of care Report consisted of patients Situation, Background, Assessment and  
Recommendations(SBAR). Information from the following report(s) SBAR, Kardex, ED Summary, [facility] and Recent Results was reviewed with the receiving nurse. Lines:  
Peripheral IV 03/20/21 Right Antecubital (Active) Site Assessment Clean, dry, & intact 03/20/21 0049 Phlebitis Assessment 0 03/20/21 0049 Infiltration Assessment 0 03/20/21 0049 Dressing Status Clean, dry, & intact 03/20/21 0049 Dressing Type Transparent 03/20/21 0049 Hub Color/Line Status Pink 03/20/21 0049 Action Taken Blood drawn 03/20/21 0049 Alcohol Cap Used Yes 03/20/21 0049 Peripheral IV 03/20/21 Left Wrist (Active) Site Assessment Clean, dry, & intact 03/20/21 0914 Phlebitis Assessment 0 03/20/21 0914 Infiltration Assessment 0 03/20/21 0914 Dressing Status Clean, dry, & intact 03/20/21 0914 Dressing Type Transparent 03/20/21 0914 Hub Color/Line Status Green 03/20/21 5883 Action Taken Blood drawn 03/20/21 0914 Saline Lock 03/19/21 Left Antecubital (Active) Opportunity for questions and clarification was provided. Patient transported with: 
 Registered Nurse

## 2021-03-21 NOTE — PROGRESS NOTES
03/21/21 1628   Vitals   Temp (!) 101.3 °F (38.5 °C)   Temp Source Oral   Pulse (Heart Rate) (!) 137   Heart Rate Source Monitor   Resp Rate 22   O2 Sat (%) 95 %   Level of Consciousness Alert   BP (!) 165/59   MAP (Calculated) 94   BP 1 Location Right upper arm   BP Patient Position Lying left side   Cardiac Rhythm Sinus Tach   MEWS Score 7   Alarms Set and Audible Cardiac alarms   Box Number HW   Pain 1   Pain Scale 1 Numeric (0 - 10)   Pain Intensity 1 5   Patient Stated Pain Goal 2   Pain Onset 1 constant   Pain Location 1 Pelvis   Pain Orientation 1 Left;Right   Pain Description 1 Aching   Pain Intervention(s) 1 Medication (see MAR)   POSS Scale   Opioid Sedation Scale 1   Oxygen Therapy   O2 Device Nasal cannula   O2 Flow Rate (L/min) 7 l/min   Patient Observation   Ambulate Ambulate to bathroom   Push IS. Tylenol provided. Will update provider. ICE pack to underarms. 1700:  Pt tolerating ice packs. Wants another for the neck area. Pending call from physician    [phone] [id number]: No new orders obtained from Dr. Zuly Calzada.

## 2021-03-21 NOTE — PROGRESS NOTES
TRANSFER - IN REPORT:    Verbal report received from sharifa(name) on Magdalena Rodriguez  being received from ED(unit) for routine progression of care      Report consisted of patients Situation, Background, Assessment and   Recommendations(SBAR). Information from the following report(s) SBAR, Kardex, Intake/Output, MAR and Recent Results was reviewed with the receiving nurse. Opportunity for questions and clarification was provided. Assessment completed upon patients arrival to unit and care assumed.

## 2021-03-22 LAB
ANION GAP SERPL CALC-SCNC: 7 MMOL/L (ref 5–15)
BACTERIA SPEC CULT: ABNORMAL
BUN SERPL-MCNC: 10 MG/DL (ref 6–20)
BUN/CREAT SERPL: 18 (ref 12–20)
CALCIUM SERPL-MCNC: 8.5 MG/DL (ref 8.5–10.1)
CC UR VC: ABNORMAL
CHLORIDE SERPL-SCNC: 107 MMOL/L (ref 97–108)
CO2 SERPL-SCNC: 23 MMOL/L (ref 21–32)
CREAT SERPL-MCNC: 0.57 MG/DL (ref 0.55–1.02)
GLUCOSE BLD STRIP.AUTO-MCNC: 182 MG/DL (ref 65–100)
GLUCOSE BLD STRIP.AUTO-MCNC: 236 MG/DL (ref 65–100)
GLUCOSE BLD STRIP.AUTO-MCNC: 270 MG/DL (ref 65–100)
GLUCOSE BLD STRIP.AUTO-MCNC: 282 MG/DL (ref 65–100)
GLUCOSE SERPL-MCNC: 207 MG/DL (ref 65–100)
POTASSIUM SERPL-SCNC: 3.3 MMOL/L (ref 3.5–5.1)
SERVICE CMNT-IMP: ABNORMAL
SODIUM SERPL-SCNC: 137 MMOL/L (ref 136–145)

## 2021-03-22 PROCEDURE — 74011250636 HC RX REV CODE- 250/636: Performed by: INTERNAL MEDICINE

## 2021-03-22 PROCEDURE — 77010033678 HC OXYGEN DAILY

## 2021-03-22 PROCEDURE — 80048 BASIC METABOLIC PNL TOTAL CA: CPT

## 2021-03-22 PROCEDURE — 82962 GLUCOSE BLOOD TEST: CPT

## 2021-03-22 PROCEDURE — 74011000258 HC RX REV CODE- 258: Performed by: INTERNAL MEDICINE

## 2021-03-22 PROCEDURE — 74011636637 HC RX REV CODE- 636/637: Performed by: INTERNAL MEDICINE

## 2021-03-22 PROCEDURE — 36415 COLL VENOUS BLD VENIPUNCTURE: CPT

## 2021-03-22 PROCEDURE — 65270000032 HC RM SEMIPRIVATE

## 2021-03-22 PROCEDURE — 99233 SBSQ HOSP IP/OBS HIGH 50: CPT | Performed by: CLINICAL NURSE SPECIALIST

## 2021-03-22 PROCEDURE — 74011250637 HC RX REV CODE- 250/637: Performed by: INTERNAL MEDICINE

## 2021-03-22 RX ORDER — INSULIN GLARGINE 100 [IU]/ML
40 INJECTION, SOLUTION SUBCUTANEOUS
Status: DISCONTINUED | OUTPATIENT
Start: 2021-03-22 | End: 2021-03-23

## 2021-03-22 RX ORDER — FUROSEMIDE 20 MG/1
20 TABLET ORAL ONCE
Status: COMPLETED | OUTPATIENT
Start: 2021-03-22 | End: 2021-03-22

## 2021-03-22 RX ORDER — INSULIN LISPRO 100 [IU]/ML
6 INJECTION, SOLUTION INTRAVENOUS; SUBCUTANEOUS
Status: DISCONTINUED | OUTPATIENT
Start: 2021-03-22 | End: 2021-03-24 | Stop reason: HOSPADM

## 2021-03-22 RX ADMIN — INSULIN GLARGINE 40 UNITS: 100 INJECTION, SOLUTION SUBCUTANEOUS at 23:52

## 2021-03-22 RX ADMIN — INSULIN LISPRO 5 UNITS: 100 INJECTION, SOLUTION INTRAVENOUS; SUBCUTANEOUS at 11:56

## 2021-03-22 RX ADMIN — ACETAMINOPHEN 650 MG: 325 TABLET ORAL at 08:36

## 2021-03-22 RX ADMIN — INSULIN LISPRO 5 UNITS: 100 INJECTION, SOLUTION INTRAVENOUS; SUBCUTANEOUS at 17:38

## 2021-03-22 RX ADMIN — FUROSEMIDE 20 MG: 20 TABLET ORAL at 16:20

## 2021-03-22 RX ADMIN — MEROPENEM 500 MG: 500 INJECTION, POWDER, FOR SOLUTION INTRAVENOUS at 16:20

## 2021-03-22 RX ADMIN — Medication 10 ML: at 13:40

## 2021-03-22 RX ADMIN — TRAMADOL HYDROCHLORIDE 50 MG: 50 TABLET, FILM COATED ORAL at 06:31

## 2021-03-22 RX ADMIN — CEFTRIAXONE SODIUM 2 G: 2 INJECTION, POWDER, FOR SOLUTION INTRAMUSCULAR; INTRAVENOUS at 17:38

## 2021-03-22 RX ADMIN — ACETAMINOPHEN 650 MG: 325 TABLET ORAL at 23:58

## 2021-03-22 RX ADMIN — MEROPENEM 500 MG: 500 INJECTION, POWDER, FOR SOLUTION INTRAVENOUS at 03:10

## 2021-03-22 RX ADMIN — Medication 10 ML: at 23:53

## 2021-03-22 RX ADMIN — IBUPROFEN 600 MG: 600 TABLET, FILM COATED ORAL at 15:24

## 2021-03-22 RX ADMIN — MEROPENEM 500 MG: 500 INJECTION, POWDER, FOR SOLUTION INTRAVENOUS at 08:36

## 2021-03-22 RX ADMIN — ENOXAPARIN SODIUM 40 MG: 40 INJECTION SUBCUTANEOUS at 08:36

## 2021-03-22 RX ADMIN — Medication 1 CAPSULE: at 08:36

## 2021-03-22 RX ADMIN — INSULIN LISPRO 3 UNITS: 100 INJECTION, SOLUTION INTRAVENOUS; SUBCUTANEOUS at 06:31

## 2021-03-22 RX ADMIN — INSULIN LISPRO 6 UNITS: 100 INJECTION, SOLUTION INTRAVENOUS; SUBCUTANEOUS at 17:38

## 2021-03-22 NOTE — PROGRESS NOTES
Bedside and Verbal shift change report given to nely (oncoming nurse) by Elizabeth Moore (offgoing nurse).  Report included the following information SBAR, Kardex, Intake/Output, MAR, Recent Results and Cardiac Rhythm ST.

## 2021-03-22 NOTE — ROUTINE PROCESS
ID Progress Note 3/22/2021--interviewed with translation susana Subjective:  
 
She appears to be feeling better and states that her back pain is improved Objective: Antibiotics: 1. Lance Lash 2. Cipro Vitals:  
Visit Vitals BP (!) 120/56 (BP 1 Location: Right arm, BP Patient Position: At rest) Pulse 98 Temp 98.2 °F (36.8 °C) Resp 18 Ht 5' 4\" (1.626 m) Wt 83.9 kg (185 lb) SpO2 98% BMI 31.76 kg/m² Tmax:  Temp (24hrs), Av.8 °F (37.7 °C), Min:98.2 °F (36.8 °C), Max:102 °F (38.9 °C) Exam:  Lungs:  clear to auscultation bilaterally Heart:  regular rate and rhythm Abdomen:  soft, non-tender. Bowel sounds normal. No masses,  no organomegaly Labs:     
Recent Labs  
  21 
0320 21 
2397 21 
0912 21 
0308 21 
1720 WBC  --  8.1  --   --  10.8 HGB  --  11.3*  --   --  13.6 PLT  --  145*  --   --  189 BUN 10 14 18 15 14 CREA 0.57 0.54* 0.77 0.84 0.88  
AP  --   --   --  116 116 TBILI  --   --   --  0.6 0.6 Cultures: No results found for: [facility] Lab Results Component Value Date/Time Culture result: NO GROWTH 2 DAYS 2021 12:44 AM  
 Culture result: ESCHERICHIA COLI (A) 2021 08:40 PM  
 
 
Radiology:  
 
Line/Insert Date:        
 
Assessment:  
 
1. UTI/pyelonephritis 2. E coli from culture 3. Fevers--persistent--can be seen in pyelonephritis Objective: 1. Adjust antibiotic therapy 2. Follow up cultures 3. Transition to oral therapy once fevers resolved Neeraj Castro MD

## 2021-03-22 NOTE — PROGRESS NOTES
TRANSFER - OUT REPORT:    Verbal report given to CYNTHIA Bird on [address]  being transferred to   for routine progression of care       Report consisted of patients Situation, Background, Assessment and   Recommendations(SBAR). Information from the following report(s) SBAR, MAR, Recent Results and Cardiac Rhythm NSR was reviewed with the receiving nurse. Lines:   Peripheral IV 03/20/21 Right Antecubital (Active)   Site Assessment Clean, dry, & intact 03/22/21 1200   Phlebitis Assessment 0 03/22/21 1200   Infiltration Assessment 0 03/22/21 1200   Dressing Status Clean, dry, & intact 03/22/21 1200   Dressing Type Tape;Transparent 03/22/21 1200   Hub Color/Line Status Pink 03/22/21 1200   Action Taken Open ports on tubing capped 03/21/21 2000   Alcohol Cap Used Yes 03/21/21 2000       Peripheral IV 03/20/21 Left Wrist (Active)   Site Assessment Clean, dry, & intact 03/22/21 1200   Phlebitis Assessment 0 03/22/21 1200   Infiltration Assessment 0 03/22/21 1200   Dressing Status Clean, dry, & intact 03/22/21 1200   Dressing Type Tape;Transparent 03/22/21 1200   Hub Color/Line Status Green 03/22/21 1200   Action Taken Open ports on tubing capped 03/21/21 2000   Alcohol Cap Used Yes 03/21/21 2000       Saline Lock 03/19/21 Left Antecubital (Active)   Site Assessment Clean, dry, & intact 03/22/21 1200   Phlebitis Assessment 0 03/22/21 1200   Infiltration Assessment 0 03/22/21 1200   Dressing Status Clean, dry, & intact 03/22/21 1200   Dressing Type Tape;Transparent 03/22/21 1200   Hub Color/Line Status Pink 03/22/21 1200   Action Taken Open ports on tubing capped 03/21/21 0800        Opportunity for questions and clarification was provided.       Patient transported with:   [employer]

## 2021-03-22 NOTE — PROGRESS NOTES
[facility]  Hospitalist Group                                                                                          Hospitalist Progress Note  Ollie Mcmahon MD  Answering service: [phone] from in house phone        Date of Service:  3/22/2021  NAME:  Emily Pisano  :  1980  MRN:  [id number]      Please note that this dictation was completed with [employer], the computer voice recognition software. Quite often unanticipated grammatical, syntax, homophones, and other interpretive errors are inadvertently transcribed by the computer software. Please disregard these errors. Please excuse any errors that have escaped final proofreading. Admission Summary: This is a 79-year-old woman with past medical history significant for type 2 diabetes, who was in her usual state of health until 2 days ago when the patient developed abdominal pain. The pain is located at the right lower quadrant with radiation to the right groin. The pain is constant, dull ache, 6/10 in severity, associated with nausea but no vomiting. The patient also complained of fever, rigors and chills as well as dysuria. The patient came to the emergency room for further evaluation. When the patient arrived at the emergency room, her urinalysis was consistent with urinary tract infection. CT scan shows evidence of pyelonephritis. The patient was about to be discharged from the emergency room on oral antibiotics. The patient then developed chills, rigors as well as tachycardia. The sepsis protocol was then initiated. The patient received fluid therapy and antibiotics therapy as per sepsis protocol and was referred to the hospitalist service for evaluation for admission.   No record of prior admission to this hospital.       Interval history / Subjective:     PATIENT seen and examined today   She is feeling better today   Talked to patient with help of [address]         Assessment & Plan:        Severe sepsis from [address] [address] pyelonephritis    with evidence of tachycardia, fever and tachypnea. Patient is on IV vancomycin and Zosyn. No leukocytosis seen CT of the abdomen with the possibility of pyelonephritis  Urine culture positive for ECOLI   Antibiotics switched from zosyn to waqas earlier   Will defer to ID to wean her antibiotics down         Worsening tachypnea and hypoxia   chest x-ray may show some evidence of fluid overload. CT chest with fluid overload   covid negative   No PE   It is SIRS and her hypoxia will improve  Small oral dose of lasix given   Wean off O2        Hypotension   responsive to fluids      Diabetes mellitus type 2  Give her Lantus and start sliding scale  . Accu-Cheks  Increased lantus to 40 and add premeal   Appreciate DTC for their recs          Code status: FULL  DVT prophylaxis: scd    Care Plan discussed with: Patient/Family  Anticipated Disposition: Home w/Family  Anticipated Discharge: 24 hours to 48 hours     Hospital Problems  Date Reviewed: 3/20/2021          Codes Class Noted POA    * (Principal) Sepsis ([facility] [address] ICD-10-CM: A41.9  ICD-9-CM: 038.9, 995.91  3/19/2021 Yes                Review of Systems:   A comprehensive review of systems was negative except for that written in the HPI. Vital Signs:    Last 24hrs VS reviewed since prior progress note.  Most recent are:  Visit Vitals  BP (!) 120/56 (BP 1 Location: Right arm, BP Patient Position: At rest)   Pulse 98   Temp 98.2 °F (36.8 °C)   Resp 18   Ht 5' 4\" (1.626 m)   Wt 83.9 kg (185 lb)   SpO2 98%   BMI 31.76 kg/m²         Intake/Output Summary (Last 24 hours) at 3/22/2021 1439  Last data filed at 3/21/2021 1859  Gross per 24 hour   Intake 440 ml   Output    Net 440 ml        Physical Examination:     I had a face to face encounter with this patient and independently examined them on 03/22/21 as outlined below:        Constitutional:  No acute distress, cooperative, pleasant    ENT:  Oral mucosa moist, oropharynx benign. Resp:  CTA bilaterally. No wheezing/rhonchi/rales. No accessory muscle use   CV:  Regular rhythm, normal rate, no murmurs, gallops, rubs    GI:  DIFFUSE ABD TENDERNESS     Musculoskeletal:  No edema, warm, 2+ pulses throughout    Neurologic:  Moves all extremities. AAOx3, CN II-XII reviewed            Data Review:    Review and/or order of clinical lab test      Labs:     Recent Labs     03/21/21  0534 03/19/21  1720   WBC 8.1 10.8   HGB 11.3* 13.6   HCT 34.1* 40.4   * 189     Recent Labs     03/22/21  0320 03/21/21  0534 03/20/21  0912    138 130*   K 3.3* 3.6 3.6    109* 104   CO2 23 17* 18*   BUN 10 14 18   CREA 0.57 0.54* 0.77   * 236* 294*   CA 8.5 7.9* 7.2*   MG  --  2.0  --    PHOS  --  1.4*  --      Recent Labs     03/20/21  0308 03/19/21  1720   ALT 21 19    116   TBILI 0.6 0.6   TP 6.5 8.2   ALB 2.4* 3.1*   GLOB 4.1* 5.1*   LPSE 81 101     No results for input(s): INR, PTP, APTT, INREXT, INREXT in the last 72 hours. No results for input(s): FE, TIBC, PSAT, FERR in the last 72 hours. No results found for: FOL, RBCF   No results for input(s): PH, PCO2, PO2 in the last 72 hours.   Recent Labs     03/20/21  0912 03/20/21  0308 03/19/21  1720   TROIQ <0.05 <0.05 <0.05     Lab Results   Component Value Date/Time    Cholesterol, total 149 02/19/2020 02:36 PM    HDL Cholesterol 55 02/19/2020 02:36 PM    LDL, calculated 77.6 02/19/2020 02:36 PM    Triglyceride 82 02/19/2020 02:36 PM    CHOL/HDL Ratio 2.7 02/19/2020 02:36 PM     Lab Results   Component Value Date/Time    Glucose (POC) 282 (H) 03/22/2021 11:36 AM    Glucose (POC) 236 (H) 03/22/2021 06:13 AM    Glucose (POC) 272 (H) 03/21/2021 09:08 PM    Glucose (POC) 245 (H) 03/21/2021 04:26 PM    Glucose (POC) 290 (H) 03/21/2021 11:03 AM     Lab Results   Component Value Date/Time    Color YELLOW/STRAW 03/19/2021 08:40 PM    Appearance CLEAR 03/19/2021 08:40 PM    Specific gravity 1.024 03/19/2021 08:40 PM pH (UA) 5.5 03/19/2021 08:40 PM    Protein 30 (A) 03/19/2021 08:40 PM    Glucose >1,000 (A) 03/19/2021 08:40 PM    Ketone 80 (A) 03/19/2021 08:40 PM    Bilirubin Negative 03/19/2021 08:40 PM    Urobilinogen 0.2 03/19/2021 08:40 PM    Nitrites Positive (A) 03/19/2021 08:40 PM    Leukocyte Esterase TRACE (A) 03/19/2021 08:40 PM    Epithelial cells FEW 03/19/2021 08:40 PM    Bacteria 4+ (A) 03/19/2021 08:40 PM    WBC 20-50 03/19/2021 08:40 PM    RBC 0-5 03/19/2021 08:40 PM         Medications Reviewed:     Current Facility-Administered Medications   Medication Dose Route Frequency    insulin glargine (LANTUS) injection 36 Units  36 Units SubCUTAneous QHS    sodium chloride (NS) flush 5-40 mL  5-40 mL IntraVENous Q8H    sodium chloride (NS) flush 5-40 mL  5-40 mL IntraVENous PRN    acetaminophen (TYLENOL) tablet 650 mg  650 mg Oral Q6H PRN    Or    acetaminophen (TYLENOL) suppository 650 mg  650 mg Rectal Q6H PRN    polyethylene glycol (MIRALAX) packet 17 g  17 g Oral DAILY PRN    promethazine (PHENERGAN) tablet 12.5 mg  12.5 mg Oral Q6H PRN    Or    ondansetron (ZOFRAN) injection 4 mg  4 mg IntraVENous Q6H PRN    enoxaparin (LOVENOX) injection 40 mg  40 mg SubCUTAneous DAILY    L.acidophilus-paracasei-S.thermophil-bifidobacter (RISAQUAD) 8 billion cell capsule  1 Cap Oral DAILY    insulin lispro (HUMALOG) injection   SubCUTAneous AC&HS    glucose chewable tablet 16 g  4 Tab Oral PRN    dextrose (D50W) injection syrg 12.5-25 g  12.5-25 g IntraVENous PRN    glucagon (GLUCAGEN) injection 1 mg  1 mg IntraMUSCular PRN    sodium chloride (NS) flush 5-10 mL  5-10 mL IntraVENous PRN    traMADoL (ULTRAM) tablet 50 mg  50 mg Oral Q6H PRN    meropenem (MERREM) 500 mg in 0.9% sodium chloride (MBP/ADV) 50 mL MBP  500 mg IntraVENous Q6H    ibuprofen (MOTRIN) tablet 600 mg  600 mg Oral Q6H PRN     ______________________________________________________________________  EXPECTED LENGTH OF STAY: - - -  ACTUAL LENGTH OF STAY:          3                 Jacklyn Smith MD

## 2021-03-22 NOTE — PROGRESS NOTES
Provided remote interpreting to pt and Dr. Kayce Paul during plan of care.     [facility] [employer]  [phone]

## 2021-03-22 NOTE — PROGRESS NOTES
Reason for Admission:  Severe sepsis from [address] [address] pyelonephritis                     RUR Score: 9% risk of re-admission                     Plan for utilizing home health:  No home health needs identified at this time         PCP: First and Last name:  Wilian Irvin MD     Name of Practice: [facility]   Are you a current patient: Yes/No: Yes   Approximate date of last visit: 6/16/2020   Can you participate in a virtual visit with your PCP: N/A    Patient agreeable for [facility] referral for free clinic due to uninsured status, Bel[place]ian speaking. Current Advanced Directive/Advance Care Plan: Full Code      Healthcare Decision Maker:   Click here to complete HealthCare Decision Makers including selection of the Healthcare Decision Maker Relationship (ie \"Primary\") Mother is legal NOK/emergency contact              Transition of Care Plan:  Home when stable, monitor medication needs    The CM met with the patient at bedside with Bel[place]ian interpretor services ([phone]) in order to introduce the role of CM and assess for patient needs. The patient lives at address on file with her mother, father, and three-year-old son. The patient does not have health insurance, PCP is listed as Dr. Adrienne Molina offered [facility] information and patient is agreeable- CM will provide [facility] application and [employer] application- patient endorses ability to read Bel[place]ian. The patient is independent with ADLs and mobility, no DME utilized in the home. The patient was working [id number] at a [employer] [place]. The patient utilizes [address] [facility] for prescriptions, family to transport home- CM provided the patient with [address] coupons, will cost-out medications with [address] upon discharge.      The CM provided [facility] and [employer] application in [place]ian to the patient, will need to be weaned from Oxygen prior to discharge- monitor ID recommendations. Gauri Enter, MSW    Care Management Interventions  PCP Verified by CM: Yes(Patient's PCP is listed as Dr. Rafael Peck- will offer [facility] application )  Mode of Transport at Discharge:  Other (see comment)(Family to transport home upon discharge)  Transition of Care Consult (CM Consult): Discharge Planning  Physical Therapy Consult: No  Occupational Therapy Consult: No  Current Support Network: Own Home, Other(Patient lives with her mother and father and three-year-old son)  Confirm Follow Up Transport: Family  Discharge Location  Discharge Placement: Home

## 2021-03-22 NOTE — CONSULTS
[address] NURSE SPECIALIST CONSULT     Initial Presentation   Collette Owens is a 42 y.o. female who presented to the ED 3/19/21 with a 2-3 day complaint of left sided abdominal pain, fever and nausea. In the ED, she was noted to have leukocytosis and UA concerning for UTI. HX: Type 2 Diabetes    DX: Sepsis s/t UTI/Pyelonephritis, Hyperglycemia     TX: IV antibiotics, fluid resucitation    Hospital course   Clinical progress has been complicated by severe sepsis with worsening tachycardia and hypoxia. Diabetes    Patient has known Type 2 diabetes, treated with 70/30 insulin PTA. Family history positive for diabetes. Admission  and A1c 11.6% indicate poor diabetes control. Ambulatory blood glucose management provided by Xin Holt by Provider for advanced diabetes nursing assessment and care, specifically related to   [x] Home management assessment  [x] Survival skill education    Diabetes-related medical history  Acute complications: Hyperglycemia without acidosis      Diabetes medication history  Drug class Currently in use Discontinued Never used   Biguanide  Metformin 1000mg BID    D/c by self 4 years ago    DDP-4 inhibitor       Sulfonylurea      Thiazolidinedione      GLP-1 RA      SGLT-2 inhibitors      Basal insulin      Bolus insulin      Fixed Dose  Combinations 70/30 insulin 25 units am  15 units pm       Subjective   I sometimes forget my night time insulin.     Patient was diagnosed with Type 2 diabetes 7 years ago  Medical care is provided 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[employer]  Does not have insurance    Patient reports the following home diabetes self-care practices:  Eating pattern  Eats 3 meals a day: corn tortilla with cheese and beans  [x] Beverages Water    Physical activity pattern  [x] Aerobic exercise Limited  Monitoring pattern  [x] Checks glucose once weekly: 300s  Taking medications pattern  [x] In-consistent administration  [x] Affordable    Social determinants of health impacting diabetes self-management practices   Concerned that you need to know more about how to stay healthy with diabetes     Now:   Lantus 36 units daily  14 units correctional insulin last 24 hrs  Fasting glucose 236  24 hr glucose pattern: 236-282  GFR over 60  WBC improved to 8.1  A1C 11.6% (aLast 13.7% Feb)  Urine culture with GNR  Objective   Physical exam  General Alert, oriented and in no acute distress/ill-appearing. Conversant and cooperative. Spoke with patient using stratus . Vital Signs   Visit Vitals  BP (!) 120/56 (BP 1 Location: Right arm, BP Patient Position: At rest)   Pulse 98   Temp 98.2 °F (36.8 °C)   Resp 18   Ht 5' 4\" (1.626 m)   Wt 83.9 kg (185 lb)   SpO2 98%   BMI 31.76 kg/m²     Skin  Warm and dry. Acanthosis noted along neckline. No lipohypertrophy or lipoatrophy noted at injection sites   Heart   Regular rate and rhythm.  No murmurs, rubs or gallops  Lungs  Clear to auscultation without rales or rhonchi  Extremities No foot wounds        Laboratory  All inpatient labs reviewed in full    Factors impacting BG management  Factor Dose Comments   Nutrition:  Carb-controlled meals     60 grams/meal      Drugs:  IV antibiotics         Pain     Infection pyelonephritis  GNR in urine      Blood glucose pattern        Assessment and Plan   Nursing Diagnosis Risk for unstable blood glucose pattern   Nursing Intervention Domain 0012 Decision-making Support   Nursing Interventions Examined current inpatient diabetes control   Explored factors facilitating and impeding inpatient management  Identified self-management practices impeding diabetes control  Explored corrective strategies with patient and responsible inpatient provider   Informed patient of rational for insulin strategy while hospitalized  Instructed patient in:    Diabetes Education Checklist    Pathophysiology  [x] Diabetes basics    Diagnostic Criteria  [x] Interpretation of Labs  [x] Hemoglobin A1c  [x] Blood glucose goals  Notes: Goal A1C 7%, patients A1C 11.6%  Goal glucose under 200    Blood Glucose Monitoring  [x] Using a glucometer  [x] When to check BG  [x] Record keeping  Notes:  Discussed they will need to obtain a meter, lancets and strips. Patient instructed to obtain a glucose reading before meals and bedtime and if the \"don't feel right\"  Patient to create a log of blood sugar readings and present to their PCP for long term management. Reducing Risks  [x] Long-term complications of diabetes     Evaluation   Dana Lloyd is a 42year old female with uncontrolled Type 2 diabetes on 70/30 insulin admitted with sepsis s/t UTI with pyleonephritis. Admitting glucose 380 with A1C 11.6%. Patient reports non-compliance with insulin, diet intake and decreased activity. While she does not have insurance, she states that insulin is affordable. At this time, glucose remains over goal of 100-180 on high dose basal and correctional insulin. Appetite is good. Recommend addition of bolus insulin to override rise in glucose associated with carbohydrate intake. Will need glucose controled for optimal recovery. Recommendations     [x] Use of Subcutaneous Insulin Order set (4515)  Insulin Dosing Specific recommendation   Advance Basal                                  (Based on weight, BMI & GFR) 40 units once daily    ADD Nutritional                                  (Based on CHO/dextrose load) 6 units Humalog/meal   Hold if patient consumes   less than 50% of   carbohydrates on meal tray   ADJUST Corrective                           (Useful in adjusting insulin dosing) [x] Insulin-resistant sensitivity        Discharge Planning   1. FUV with [facility] 1-2 weeks following hospital discharge    2. Prescription for glucometer kit (Meter, Lancets (100), Strips (100)).   Patient to obtain a blood glucose reading four times daily. First thing in the morning prior to eating and drinking anything then before lunch, dinner and bedtime. Create a log and present to PCP for interpretation. 3. Resume metformin: 500mg BID (will need refill) would not resume PTA dose as she has been off of this for a few years/would likely have GI upset at that dose. Will need it titrated back up with PCP    4. Resume 70/30 insulin: dose TBD  Billing Code(s)     [x] [id number]       Before making these care recommendations, I personally reviewed the hosptialization record, including laboratory and diagnostic data, medications and examined the patient at bedside (circumstances permitting).   Total minutes: 48    RUFINA Rodriguez  Diabetes Clinical Nurse Specialist  [facility]  Access via [email or web address]

## 2021-03-23 ENCOUNTER — APPOINTMENT (OUTPATIENT)
Dept: GENERAL RADIOLOGY | Age: 41
DRG: 871 | End: 2021-03-23
Attending: INTERNAL MEDICINE

## 2021-03-23 LAB
GLUCOSE BLD STRIP.AUTO-MCNC: 122 MG/DL (ref 65–100)
GLUCOSE BLD STRIP.AUTO-MCNC: 131 MG/DL (ref 65–100)
GLUCOSE BLD STRIP.AUTO-MCNC: 185 MG/DL (ref 65–100)
GLUCOSE BLD STRIP.AUTO-MCNC: 213 MG/DL (ref 65–100)
PHOSPHATE SERPL-MCNC: 3 MG/DL (ref 2.6–4.7)
SERVICE CMNT-IMP: ABNORMAL

## 2021-03-23 PROCEDURE — 74011250637 HC RX REV CODE- 250/637: Performed by: INTERNAL MEDICINE

## 2021-03-23 PROCEDURE — 74011250636 HC RX REV CODE- 250/636: Performed by: INTERNAL MEDICINE

## 2021-03-23 PROCEDURE — 74011636637 HC RX REV CODE- 636/637: Performed by: INTERNAL MEDICINE

## 2021-03-23 PROCEDURE — 65270000032 HC RM SEMIPRIVATE

## 2021-03-23 PROCEDURE — 74011000258 HC RX REV CODE- 258: Performed by: INTERNAL MEDICINE

## 2021-03-23 PROCEDURE — 94760 N-INVAS EAR/PLS OXIMETRY 1: CPT

## 2021-03-23 PROCEDURE — 36415 COLL VENOUS BLD VENIPUNCTURE: CPT

## 2021-03-23 PROCEDURE — 71045 X-RAY EXAM CHEST 1 VIEW: CPT

## 2021-03-23 PROCEDURE — 84100 ASSAY OF PHOSPHORUS: CPT

## 2021-03-23 PROCEDURE — 99231 SBSQ HOSP IP/OBS SF/LOW 25: CPT | Performed by: CLINICAL NURSE SPECIALIST

## 2021-03-23 PROCEDURE — 77010033678 HC OXYGEN DAILY

## 2021-03-23 PROCEDURE — 82962 GLUCOSE BLOOD TEST: CPT

## 2021-03-23 RX ORDER — INSULIN GLARGINE 100 [IU]/ML
44 INJECTION, SOLUTION SUBCUTANEOUS
Status: DISCONTINUED | OUTPATIENT
Start: 2021-03-23 | End: 2021-03-24 | Stop reason: HOSPADM

## 2021-03-23 RX ORDER — SODIUM,POTASSIUM PHOSPHATES 280-250MG
2 POWDER IN PACKET (EA) ORAL ONCE
Status: COMPLETED | OUTPATIENT
Start: 2021-03-23 | End: 2021-03-23

## 2021-03-23 RX ORDER — FUROSEMIDE 10 MG/ML
40 INJECTION INTRAMUSCULAR; INTRAVENOUS ONCE
Status: COMPLETED | OUTPATIENT
Start: 2021-03-23 | End: 2021-03-23

## 2021-03-23 RX ADMIN — INSULIN LISPRO 6 UNITS: 100 INJECTION, SOLUTION INTRAVENOUS; SUBCUTANEOUS at 08:08

## 2021-03-23 RX ADMIN — Medication 10 ML: at 22:02

## 2021-03-23 RX ADMIN — IBUPROFEN 600 MG: 600 TABLET, FILM COATED ORAL at 08:52

## 2021-03-23 RX ADMIN — ACETAMINOPHEN 650 MG: 325 TABLET ORAL at 05:42

## 2021-03-23 RX ADMIN — CEFTRIAXONE SODIUM 2 G: 2 INJECTION, POWDER, FOR SOLUTION INTRAMUSCULAR; INTRAVENOUS at 17:40

## 2021-03-23 RX ADMIN — INSULIN LISPRO 3 UNITS: 100 INJECTION, SOLUTION INTRAVENOUS; SUBCUTANEOUS at 12:56

## 2021-03-23 RX ADMIN — INSULIN LISPRO 6 UNITS: 100 INJECTION, SOLUTION INTRAVENOUS; SUBCUTANEOUS at 17:33

## 2021-03-23 RX ADMIN — POTASSIUM & SODIUM PHOSPHATES POWDER PACK 280-160-250 MG 2 PACKET: 280-160-250 PACK at 14:29

## 2021-03-23 RX ADMIN — INSULIN LISPRO 2 UNITS: 100 INJECTION, SOLUTION INTRAVENOUS; SUBCUTANEOUS at 08:09

## 2021-03-23 RX ADMIN — Medication 10 ML: at 14:29

## 2021-03-23 RX ADMIN — INSULIN GLARGINE 44 UNITS: 100 INJECTION, SOLUTION SUBCUTANEOUS at 22:01

## 2021-03-23 RX ADMIN — FUROSEMIDE 40 MG: 10 INJECTION, SOLUTION INTRAMUSCULAR; INTRAVENOUS at 14:29

## 2021-03-23 RX ADMIN — ACETAMINOPHEN 650 MG: 325 TABLET ORAL at 20:29

## 2021-03-23 RX ADMIN — Medication 10 ML: at 06:55

## 2021-03-23 RX ADMIN — INSULIN LISPRO 6 UNITS: 100 INJECTION, SOLUTION INTRAVENOUS; SUBCUTANEOUS at 12:57

## 2021-03-23 RX ADMIN — ACETAMINOPHEN 650 MG: 325 TABLET ORAL at 11:51

## 2021-03-23 RX ADMIN — ENOXAPARIN SODIUM 40 MG: 40 INJECTION SUBCUTANEOUS at 08:51

## 2021-03-23 RX ADMIN — Medication 1 CAPSULE: at 08:51

## 2021-03-23 NOTE — PROGRESS NOTES
[address] [facility]  Hospitalist Group                                                                                          Hospitalist Progress Note  Shawna Potts MD  Answering service: [phone] OR [phone] from in house phone        Date of Service:  3/23/2021  NAME:  Leia Tran  :  1980  MRN:  [id number]      Please note that this dictation was completed with [employer], the computer voice recognition software. Quite often unanticipated grammatical, syntax, homophones, and other interpretive errors are inadvertently transcribed by the computer software. Please disregard these errors. Please excuse any errors that have escaped final proofreading. Admission Summary: This is a 39-year-old woman with past medical history significant for type 2 diabetes, who was in her usual state of health until 2 days ago when the patient developed abdominal pain. The pain is located at the right lower quadrant with radiation to the right groin. The pain is constant, dull ache, 6/10 in severity, associated with nausea but no vomiting. The patient also complained of fever, rigors and chills as well as dysuria. The patient came to the emergency room for further evaluation. When the patient arrived at the emergency room, her urinalysis was consistent with urinary tract infection. CT scan shows evidence of pyelonephritis. The patient was about to be discharged from the emergency room on oral antibiotics. The patient then developed chills, rigors as well as tachycardia. The sepsis protocol was then initiated. The patient received fluid therapy and antibiotics therapy as per sepsis protocol and was referred to the hospitalist service for evaluation for admission. No record of prior admission to this hospital.       Interval history / Subjective:   Seen and examined today  Complainig of some pain in the bilateral lower quadrants. Finally afebrile.    CXR done shows worsening pulmonary edema, and she remains on O2   Will trial dose of lasix today      Assessment & Plan:        Severe sepsis from E Coli pyelonephritis (HR, Temp, RR)  - s/p Vanc/zosyn now de-escalated to CTX   - plan to DC on oral cipro   - ID following, if remains afebrile ok to DC 3/24  - PRN tramadol for pain      Worsening tachypnea and hypoxia  - CXR and CT with ongoing fluid overload. Suspect secondary to sepsis  - CTA neg for PE, COVID negative  - Give x 1 dose IV lasix today  - Wean O2 as tolerated.      Hypotension   responsive to fluids      Diabetes mellitus type 2 - A1c 11.6%  - Increase lantus to 44U daily  - meal time 6 U + SSI  - Monitor POCT glucose checks and hypoglycemia protocol   - Diabetes education, and management team following as well     Hypophosphatemia - replete and monitor daily  Hypokalemia - replete and monitor daily   Code status: FULL  DVT prophylaxis: scd    Care Plan discussed with: Patient/Family  Anticipated Disposition: Home w/Family  Anticipated Discharge: 24 hours to 48 hours     Hospital Problems  Date Reviewed: 3/20/2021          Codes Class Noted POA    * (Principal) Sepsis ([facility] [address] ICD-10-CM: A41.9  ICD-9-CM: 038.9, 995.91  3/19/2021 Yes                Review of Systems:   A comprehensive review of systems was negative except for that written in the HPI. Vital Signs:    Last 24hrs VS reviewed since prior progress note.  Most recent are:  Visit Vitals  /75 (BP 1 Location: Right arm, BP Patient Position: Sitting)   Pulse 99   Temp 98.3 °F (36.8 °C)   Resp 16   Ht 5' 4\" (1.626 m)   Wt 77.7 kg (171 lb 4.8 oz)   SpO2 99%   BMI 29.40 kg/m²         Intake/Output Summary (Last 24 hours) at 3/23/2021 1320  Last data filed at 3/23/2021 0950  Gross per 24 hour   Intake 720 ml   Output    Net 720 ml        Physical Examination:     I had a face to face encounter with this patient and independently examined them on 03/23/21 as outlined below:        Constitutional:  No acute distress, cooperative, pleasant    ENT:  Oral mucosa moist, oropharynx benign. Resp:  CTA bilaterally. No wheezing/rhonchi/rales. No accessory muscle use   CV:  Regular rhythm, normal rate, no murmurs, gallops, rubs    GI:  Tender to palpation over the bilateral lower quadrants, normal bowel sounds. Musculoskeletal:  No edema, warm, 2+ pulses throughout    Neurologic:  Moves all extremities. CN II-XII reviewed            Data Review:    Review and/or order of clinical lab test      Labs:     Recent Labs     03/21/21  0534   WBC 8.1   HGB 11.3*   HCT 34.1*   *     Recent Labs     03/22/21  0320 03/21/21  0534    138   K 3.3* 3.6    109*   CO2 23 17*   BUN 10 14   CREA 0.57 0.54*   * 236*   CA 8.5 7.9*   MG  --  2.0   PHOS  --  1.4*     No results for input(s): ALT, AP, TBIL, TBILI, TP, ALB, GLOB, GGT, AML, LPSE in the last 72 hours. No lab exists for component: SGOT, GPT, AMYP, HLPSE  No results for input(s): INR, PTP, APTT, INREXT, INREXT in the last 72 hours. No results for input(s): FE, TIBC, PSAT, FERR in the last 72 hours. No results found for: FOL, RBCF   No results for input(s): PH, PCO2, PO2 in the last 72 hours. No results for input(s): CPK, CKNDX, TROIQ in the last 72 hours.     No lab exists for component: CPKMB  Lab Results   Component Value Date/Time    Cholesterol, total 149 02/19/2020 02:36 PM    HDL Cholesterol 55 02/19/2020 02:36 PM    LDL, calculated 77.6 02/19/2020 02:36 PM    Triglyceride 82 02/19/2020 02:36 PM    CHOL/HDL Ratio 2.7 02/19/2020 02:36 PM     Lab Results   Component Value Date/Time    Glucose (POC) 213 (H) 03/23/2021 11:28 AM    Glucose (POC) 185 (H) 03/23/2021 06:57 AM    Glucose (POC) 182 (H) 03/22/2021 10:20 PM    Glucose (POC) 270 (H) 03/22/2021 05:19 PM    Glucose (POC) 282 (H) 03/22/2021 11:36 AM     Lab Results   Component Value Date/Time    Color YELLOW/STRAW 03/19/2021 08:40 PM    Appearance CLEAR 03/19/2021 08:40 PM    Specific gravity 1.024 03/19/2021 08:40 PM    pH (UA) 5.5 03/19/2021 08:40 PM    Protein 30 (A) 03/19/2021 08:40 PM    Glucose >1,000 (A) 03/19/2021 08:40 PM    Ketone 80 (A) 03/19/2021 08:40 PM    Bilirubin Negative 03/19/2021 08:40 PM    Urobilinogen 0.2 03/19/2021 08:40 PM    Nitrites Positive (A) 03/19/2021 08:40 PM    Leukocyte Esterase TRACE (A) 03/19/2021 08:40 PM    Epithelial cells FEW 03/19/2021 08:40 PM    Bacteria 4+ (A) 03/19/2021 08:40 PM    WBC 20-50 03/19/2021 08:40 PM    RBC 0-5 03/19/2021 08:40 PM         Medications Reviewed:     Current Facility-Administered Medications   Medication Dose Route Frequency    furosemide (LASIX) injection 40 mg  40 mg IntraVENous ONCE    insulin glargine (LANTUS) injection 40 Units  40 Units SubCUTAneous QHS    insulin lispro (HUMALOG) injection 6 Units  6 Units SubCUTAneous TIDAC    cefTRIAXone (ROCEPHIN) 2 g in 0.9% sodium chloride (MBP/ADV) 50 mL MBP  2 g IntraVENous Q24H    sodium chloride (NS) flush 5-40 mL  5-40 mL IntraVENous Q8H    sodium chloride (NS) flush 5-40 mL  5-40 mL IntraVENous PRN    acetaminophen (TYLENOL) tablet 650 mg  650 mg Oral Q6H PRN    Or    acetaminophen (TYLENOL) suppository 650 mg  650 mg Rectal Q6H PRN    polyethylene glycol (MIRALAX) packet 17 g  17 g Oral DAILY PRN    promethazine (PHENERGAN) tablet 12.5 mg  12.5 mg Oral Q6H PRN    Or    ondansetron (ZOFRAN) injection 4 mg  4 mg IntraVENous Q6H PRN    enoxaparin (LOVENOX) injection 40 mg  40 mg SubCUTAneous DAILY    L.acidophilus-paracasei-S.thermophil-bifidobacter (RISAQUAD) 8 billion cell capsule  1 Cap Oral DAILY    insulin lispro (HUMALOG) injection   SubCUTAneous AC&HS    glucose chewable tablet 16 g  4 Tab Oral PRN    dextrose (D50W) injection syrg 12.5-25 g  12.5-25 g IntraVENous PRN    glucagon (GLUCAGEN) injection 1 mg  1 mg IntraMUSCular PRN    sodium chloride (NS) flush 5-10 mL  5-10 mL IntraVENous PRN    traMADoL (ULTRAM) tablet 50 mg  50 mg Oral Q6H PRN    ibuprofen (MOTRIN) tablet 600 mg  600 mg Oral Q6H PRN     ______________________________________________________________________  EXPECTED LENGTH OF STAY: 3d 12h  ACTUAL LENGTH OF STAY:          4                 Chip Soto MD

## 2021-03-23 NOTE — PROGRESS NOTES
Provided remote interpreting to pt and RN, Dario Dawkins during information about medicine.     [employer]  [phone]

## 2021-03-23 NOTE — PROGRESS NOTES
Problem: Falls - Risk of  Goal: *Absence of Falls  Description: Document Earma Mat Fall Risk and appropriate interventions in the flowsheet. Outcome: Progressing Towards Goal  Note: Fall Risk Interventions:            Medication Interventions: Patient to call before getting OOB                   Problem: Patient Education: Go to Patient Education Activity  Goal: Patient/Family Education  Outcome: Progressing Towards Goal     Problem: Diabetes Self-Management  Goal: *Disease process and treatment process  Description: Define diabetes and identify own type of diabetes; list 3 options for treating diabetes. Outcome: Progressing Towards Goal  Goal: *Incorporating nutritional management into lifestyle  Description: Describe effect of type, amount and timing of food on blood glucose; list 3 methods for planning meals. Outcome: Progressing Towards Goal  Goal: *Incorporating physical activity into lifestyle  Description: State effect of exercise on blood glucose levels. Outcome: Progressing Towards Goal  Goal: *Developing strategies to promote health/change behavior  Description: Define the ABC's of diabetes; identify appropriate screenings, schedule and personal plan for screenings. Outcome: Progressing Towards Goal  Goal: *Using medications safely  Description: State effect of diabetes medications on diabetes; name diabetes medication taking, action and side effects. Outcome: Progressing Towards Goal  Goal: *Monitoring blood glucose, interpreting and using results  Description: Identify recommended blood glucose targets  and personal targets. Outcome: Progressing Towards Goal  Goal: *Prevention, detection, treatment of acute complications  Description: List symptoms of hyper- and hypoglycemia; describe how to treat low blood sugar and actions for lowering  high blood glucose level.   Outcome: Progressing Towards Goal  Goal: *Prevention, detection and treatment of chronic complications  Description: Define the natural course of diabetes and describe the relationship of blood glucose levels to long term complications of diabetes.   Outcome: Progressing Towards Goal  Goal: *Developing strategies to address psychosocial issues  Description: Describe feelings about living with diabetes; identify support needed and support network  Outcome: Progressing Towards Goal  Goal: *Insulin pump training  Outcome: Progressing Towards Goal  Goal: *Sick day guidelines  Outcome: Progressing Towards Goal  Goal: *Patient Specific Goal (EDIT GOAL, INSERT TEXT)  Outcome: Progressing Towards Goal     Problem: Patient Education: Go to Patient Education Activity  Goal: Patient/Family Education  Outcome: Progressing Towards Goal

## 2021-03-23 NOTE — ROUTINE PROCESS
ID Progress Note 3/23/2021--interviewed with translation susana Subjective:  
 
She appears to be feeling better and fevers are down Objective: Antibiotics: 1. Lyndy Mo 2. Cipro Vitals:  
Visit Vitals /75 (BP 1 Location: Right arm, BP Patient Position: Sitting) Pulse 99 Temp 98.3 °F (36.8 °C) Resp 16 Ht 5' 4\" (1.626 m) Wt 77.7 kg (171 lb 4.8 oz) SpO2 99% BMI 29.40 kg/m² Tmax:  Temp (24hrs), Av.3 °F (36.8 °C), Min:97.9 °F (36.6 °C), Max:98.5 °F (36.9 °C) Exam:  Lungs:  clear to auscultation bilaterally Heart:  regular rate and rhythm Abdomen:  soft, non-tender. Bowel sounds normal. No masses,  no organomegaly Labs:     
Recent Labs  
  21 
0320 21 
0534 WBC  --  8.1 HGB  --  11.3*  
PLT  --  145* BUN 10 14 CREA 0.57 0.54* Cultures: No results found for: SDES Lab Results Component Value Date/Time Culture result: NO GROWTH 3 DAYS 2021 12:44 AM  
 Culture result: ESCHERICHIA COLI (A) 2021 08:40 PM  
 
 
Radiology:  
 
Line/Insert Date:        
 
Assessment:  
 
1. UTI/pyelonephritis 2. E coli from culture 3. Fevers--persistent--can be seen in pyelonephritis--temps down to normal 
 
Objective: 1. Adjust antibiotic therapy 2. Follow up cultures 3. Transition to cipro 500 mg po bid to complete 14 days of therapy when she is discharged--home once afebrile 48 hours Sherif Kirby MD

## 2021-03-23 NOTE — PROGRESS NOTES
Bedside and Verbal shift change report given to Heather Resendiz (oncoming nurse) by [address], RN (offgoing nurse). Report included the following information SBAR, Kardex, MAR and Recent Results.

## 2021-03-23 NOTE — DIABETES MGMT
[address] [facility]    CLINICAL NURSE SPECIALIST CONSULT   INITIAL NOTE  Initial Presentation   Whit Pal is a 42 y.o. female who presented to the ED 3/19/21 with a 2-3 day complaint of left sided abdominal pain, fever and nausea. In the ED, she was noted to have leukocytosis and UA concerning for UTI. HX: Type 2 Diabetes    DX: Sepsis s/t UTI/Pyelonephritis, Hyperglycemia     TX: IV antibiotics, fluid resucitation    Hospital course   Clinical progress has been complicated by severe sepsis with worsening tachycardia and hypoxia. CXR / CT show fluid overload-treating with Lasix     Diabetes    Patient has known Type 2 diabetes, treated with 70/30 insulin PTA. Family history positive for diabetes. Admission  and A1c 11.6% indicate poor diabetes control. Ambulatory blood glucose management provided by [facility] by Provider for advanced diabetes nursing assessment and care, specifically related to   [x] Home management assessment  [x] Survival skill education    Diabetes-related medical history  Acute complications: Hyperglycemia without acidosis      Diabetes medication history  Drug class Currently in use Discontinued Never used   Biguanide  Metformin 1000mg BID    D/c by self 4 years ago    DDP-4 inhibitor       Sulfonylurea      Thiazolidinedione      GLP-1 RA      SGLT-2 inhibitors      Basal insulin      Bolus insulin      Fixed Dose  Combinations 70/30 insulin 25 units am  15 units pm       Subjective   Discussed diet changes she will need to make to manage her diabetes via interpretor over the phone.      Patient was diagnosed with Type 2 diabetes 7 years ago  Medical care is provided by Hayde Li with her parents  Owns a [employer]  Does not have insurance    Patient reports the following home diabetes self-care practices:  Eating pattern  Eats 3 meals a day: corn tortilla with cheese and beans  [x] Beverages Water    Physical activity pattern  [x] Aerobic exercise Limited  Monitoring pattern  [x] Checks glucose once weekly: 300s  Taking medications pattern  [x] In-consistent administration  [x] Affordable    Social determinants of health impacting diabetes self-management practices   Concerned that you need to know more about how to stay healthy with diabetes       Objective   Physical exam  General Alert, oriented and in no acute distress/ill-appearing. Conversant and cooperative. Spoke with patient using stratus . Vital Signs   Visit Vitals  /75 (BP 1 Location: Right arm, BP Patient Position: Sitting)   Pulse 99   Temp 98.3 °F (36.8 °C)   Resp 16   Ht 5' 4\" (1.626 m)   Wt 77.7 kg (171 lb 4.8 oz)   SpO2 99%   BMI 29.40 kg/m²     Skin  Warm and dry. Acanthosis noted along neckline. No lipohypertrophy or lipoatrophy noted at injection sites   Heart   Regular rate and rhythm.  No murmurs, rubs or gallops  Lungs  Clear to auscultation without rales or rhonchi  Extremities No foot wounds        Laboratory  All inpatient labs reviewed in full    Factors impacting BG management  Factor Dose Comments   Nutrition:  Carb-controlled meals     60 grams/meal      Drugs:  IV antibiotics         Pain     Infection pyelonephritis  GNR in urine      Blood glucose pattern        Assessment and Plan   Nursing Diagnosis Risk for unstable blood glucose pattern   Nursing Intervention Domain 5257 Decision-making Support   Nursing Interventions Examined current inpatient diabetes control   Explored factors facilitating and impeding inpatient management  Identified self-management practices impeding diabetes control  Explored corrective strategies with patient and responsible inpatient provider   Informed patient of rational for insulin strategy while hospitalized  Instructed patient in:    Diabetes Education Checklist    Pathophysiology  [x] Diabetes basics    Diagnostic Criteria  [x] Interpretation of Labs  [x] Hemoglobin A1c  [x] Blood glucose goals  Notes: Goal A1C 7%, patients A1C 11.6%  Goal glucose under 200    Blood Glucose Monitoring  [x] Using a glucometer  [x] When to check BG  [x] Record keeping  Notes:  Discussed they will need to obtain a meter, lancets and strips. Patient instructed to obtain a glucose reading before meals and bedtime and if the \"don't feel right\"  Patient to create a log of blood sugar readings and present to their PCP for long term management. Reducing Risks  [x] Long-term complications of diabetes    Diet  Monitor carbohydrate intake  Which foods affect BG negatively  Portion control     Evaluation   Magdalena Garcia is a 42year old female with uncontrolled Type 2 diabetes on 70/30 insulin admitted with sepsis s/t UTI with pyleonephritis. Admitting glucose 380 with A1C 11.6%. Patient reports non-compliance with insulin, diet intake and decreased activity. While she does not have insurance, she states that insulin is affordable. At this time, glucose remains over goal of 100-180 on high dose basal and correctional insulin. Appetite is good. Recommend addition of bolus insulin to override rise in glucose associated with carbohydrate intake. Will need glucose controled for optimal recovery. BG trends improved compared to 3/22/21. Noted appropriate increase in basal insulin today. Also receiving scheduled pre meal and correctional insulin regimen.     Recommendations     [x] Use of Subcutaneous Insulin Order set (8211)  Insulin Dosing Specific recommendation   Advance Basal                                  (Based on weight, BMI & GFR) 44 units once daily    ADD Nutritional                                  (Based on CHO/dextrose load) 6 units Humalog/meal   Hold if patient consumes   less than 50% of   carbohydrates on meal tray   ADJUST Corrective                           (Useful in adjusting insulin dosing) [x] Insulin-resistant sensitivity Discharge Planning   1. FUV with [facility] 1-2 weeks following hospital discharge    2. Prescription for glucometer kit (Meter, Lancets (100), Strips (100)). Patient to obtain a blood glucose reading four times daily. First thing in the morning prior to eating and drinking anything then before lunch, dinner and bedtime. Create a log and present to PCP for interpretation. 3. Resume metformin: 500mg BID (will need refill) would not resume PTA dose as she has been off of this for a few years/would likely have GI upset at that dose. Will need it titrated back up with PCP    4. Resume 70/30 insulin: dose TBD  Billing Code(s)     [x] 77753      Before making these care recommendations, I personally reviewed the hosptialization record, including laboratory and diagnostic data, medications and examined the patient at bedside (circumstances permitting).   Total minutes: [address] [facility] RUFINA Diego  Diabetes Clinical Nurse Specialist  [facility]  Access via [address] [place] [address]

## 2021-03-24 VITALS
WEIGHT: 167.5 LBS | TEMPERATURE: 98.6 F | DIASTOLIC BLOOD PRESSURE: 76 MMHG | HEART RATE: 95 BPM | OXYGEN SATURATION: 94 % | HEIGHT: 64 IN | RESPIRATION RATE: 16 BRPM | BODY MASS INDEX: 28.6 KG/M2 | SYSTOLIC BLOOD PRESSURE: 120 MMHG

## 2021-03-24 LAB
ANION GAP SERPL CALC-SCNC: 7 MMOL/L (ref 5–15)
BASOPHILS # BLD: 0 K/UL (ref 0–0.1)
BASOPHILS NFR BLD: 1 % (ref 0–1)
BUN SERPL-MCNC: 7 MG/DL (ref 6–20)
BUN/CREAT SERPL: 16 (ref 12–20)
CALCIUM SERPL-MCNC: 8.5 MG/DL (ref 8.5–10.1)
CHLORIDE SERPL-SCNC: 107 MMOL/L (ref 97–108)
CO2 SERPL-SCNC: 27 MMOL/L (ref 21–32)
CREAT SERPL-MCNC: 0.45 MG/DL (ref 0.55–1.02)
DIFFERENTIAL METHOD BLD: ABNORMAL
EOSINOPHIL # BLD: 0.1 K/UL (ref 0–0.4)
EOSINOPHIL NFR BLD: 2 % (ref 0–7)
ERYTHROCYTE [DISTWIDTH] IN BLOOD BY AUTOMATED COUNT: 13.1 % (ref 11.5–14.5)
GLUCOSE BLD STRIP.AUTO-MCNC: 108 MG/DL (ref 65–100)
GLUCOSE BLD STRIP.AUTO-MCNC: 218 MG/DL (ref 65–100)
GLUCOSE SERPL-MCNC: 103 MG/DL (ref 65–100)
HCT VFR BLD AUTO: 31.6 % (ref 35–47)
HGB BLD-MCNC: 10.9 G/DL (ref 11.5–16)
IMM GRANULOCYTES # BLD AUTO: 0.1 K/UL (ref 0–0.04)
IMM GRANULOCYTES NFR BLD AUTO: 1 % (ref 0–0.5)
LYMPHOCYTES # BLD: 1.3 K/UL (ref 0.8–3.5)
LYMPHOCYTES NFR BLD: 23 % (ref 12–49)
MAGNESIUM SERPL-MCNC: 2 MG/DL (ref 1.6–2.4)
MCH RBC QN AUTO: 27.5 PG (ref 26–34)
MCHC RBC AUTO-ENTMCNC: 34.5 G/DL (ref 30–36.5)
MCV RBC AUTO: 79.8 FL (ref 80–99)
MONOCYTES # BLD: 0.5 K/UL (ref 0–1)
MONOCYTES NFR BLD: 9 % (ref 5–13)
NEUTS SEG # BLD: 3.6 K/UL (ref 1.8–8)
NEUTS SEG NFR BLD: 64 % (ref 32–75)
NRBC # BLD: 0 K/UL (ref 0–0.01)
NRBC BLD-RTO: 0 PER 100 WBC
PHOSPHATE SERPL-MCNC: 4 MG/DL (ref 2.6–4.7)
PLATELET # BLD AUTO: 234 K/UL (ref 150–400)
PMV BLD AUTO: 10.2 FL (ref 8.9–12.9)
POTASSIUM SERPL-SCNC: 3.1 MMOL/L (ref 3.5–5.1)
RBC # BLD AUTO: 3.96 M/UL (ref 3.8–5.2)
SERVICE CMNT-IMP: ABNORMAL
SERVICE CMNT-IMP: ABNORMAL
SODIUM SERPL-SCNC: 141 MMOL/L (ref 136–145)
WBC # BLD AUTO: 5.6 K/UL (ref 3.6–11)

## 2021-03-24 PROCEDURE — 74011250637 HC RX REV CODE- 250/637: Performed by: INTERNAL MEDICINE

## 2021-03-24 PROCEDURE — 80048 BASIC METABOLIC PNL TOTAL CA: CPT

## 2021-03-24 PROCEDURE — 83735 ASSAY OF MAGNESIUM: CPT

## 2021-03-24 PROCEDURE — 74011250636 HC RX REV CODE- 250/636: Performed by: INTERNAL MEDICINE

## 2021-03-24 PROCEDURE — 94760 N-INVAS EAR/PLS OXIMETRY 1: CPT

## 2021-03-24 PROCEDURE — 85025 COMPLETE CBC W/AUTO DIFF WBC: CPT

## 2021-03-24 PROCEDURE — 74011636637 HC RX REV CODE- 636/637: Performed by: INTERNAL MEDICINE

## 2021-03-24 PROCEDURE — 36415 COLL VENOUS BLD VENIPUNCTURE: CPT

## 2021-03-24 PROCEDURE — 77010033678 HC OXYGEN DAILY

## 2021-03-24 PROCEDURE — 84100 ASSAY OF PHOSPHORUS: CPT

## 2021-03-24 PROCEDURE — 82962 GLUCOSE BLOOD TEST: CPT

## 2021-03-24 RX ORDER — INSULIN PUMP SYRINGE, 3 ML
EACH MISCELLANEOUS
Qty: 1 KIT | Refills: 0 | Status: SHIPPED | OUTPATIENT
Start: 2021-03-24

## 2021-03-24 RX ORDER — METFORMIN HYDROCHLORIDE 500 MG/1
500 TABLET ORAL 2 TIMES DAILY WITH MEALS
Qty: 60 TAB | Refills: 0 | Status: SHIPPED | OUTPATIENT
Start: 2021-03-24

## 2021-03-24 RX ADMIN — INSULIN LISPRO 3 UNITS: 100 INJECTION, SOLUTION INTRAVENOUS; SUBCUTANEOUS at 12:39

## 2021-03-24 RX ADMIN — ENOXAPARIN SODIUM 40 MG: 40 INJECTION SUBCUTANEOUS at 09:44

## 2021-03-24 RX ADMIN — INSULIN LISPRO 6 UNITS: 100 INJECTION, SOLUTION INTRAVENOUS; SUBCUTANEOUS at 07:06

## 2021-03-24 RX ADMIN — INSULIN LISPRO 6 UNITS: 100 INJECTION, SOLUTION INTRAVENOUS; SUBCUTANEOUS at 12:39

## 2021-03-24 RX ADMIN — Medication 10 ML: at 06:33

## 2021-03-24 RX ADMIN — Medication 1 CAPSULE: at 09:44

## 2021-03-24 NOTE — PROGRESS NOTES
Problem: Falls - Risk of  Goal: *Absence of Falls  Description: Document Malini Montero Fall Risk and appropriate interventions in the flowsheet. Outcome: Progressing Towards Goal  Note: Fall Risk Interventions:            Medication Interventions: Patient to call before getting OOB                   Problem: Diabetes Self-Management  Goal: *Disease process and treatment process  Description: Define diabetes and identify own type of diabetes; list 3 options for treating diabetes. Outcome: Progressing Towards Goal  Goal: *Incorporating nutritional management into lifestyle  Description: Describe effect of type, amount and timing of food on blood glucose; list 3 methods for planning meals. Outcome: Progressing Towards Goal  Goal: *Incorporating physical activity into lifestyle  Description: State effect of exercise on blood glucose levels. Outcome: Progressing Towards Goal  Goal: *Developing strategies to promote health/change behavior  Description: Define the ABC's of diabetes; identify appropriate screenings, schedule and personal plan for screenings. Outcome: Progressing Towards Goal  Goal: *Monitoring blood glucose, interpreting and using results  Description: Identify recommended blood glucose targets  and personal targets.   Outcome: Progressing Towards Goal  Goal: *Developing strategies to address psychosocial issues  Description: Describe feelings about living with diabetes; identify support needed and support network  Outcome: Progressing Towards Goal  Goal: *Sick day guidelines  Outcome: Progressing Towards Goal

## 2021-03-24 NOTE — DISCHARGE SUMMARY
Discharge Summary       PATIENT ID: Deena Cárdenas  MRN: [id number]   YOB: 1980    DATE OF ADMISSION: 3/19/2021  6:08 PM    DATE OF DISCHARGE: 3/24/2021     PRIMARY CARE PROVIDER: Douglas Cifuentes MD     ATTENDING PHYSICIAN: Natalio Borja MD  DISCHARGING PROVIDER: Flako Winkler NP    To contact this individual call [phone] and ask the  to page. If unavailable ask to be transferred the Adult Hospitalist Department. CONSULTATIONS: IP CONSULT TO INFECTIOUS DISEASES    PROCEDURES/SURGERIES: * No surgery found *    ADMITTING DIAGNOSES & HOSPITAL COURSE:     Sepsis, Acute Right Pyelonephritis, T2DM, Hyponatremia, ST, Bacterial PNA    This is a 53-year-old woman with past medical history significant for type 2 diabetes, who was in her usual state of health until 2 days ago when the patient developed abdominal pain. The pain is located at the right lower quadrant with radiation to the right groin. The pain is constant, dull ache, 6/10 in severity, associated with nausea but no vomiting. The patient also complained of fever, rigors and chills as well as dysuria. The patient came to the emergency room for further evaluation. When the patient arrived at the emergency room, her urinalysis was consistent with urinary tract infection. CT scan shows evidence of pyelonephritis. The patient was about to be discharged from the emergency room on oral antibiotics. The patient then developed chills, rigors as well as tachycardia. The sepsis protocol was then initiated. The patient received fluid therapy and antibiotics therapy as per sepsis protocol and was referred to the hospitalist service for evaluation for admission.   No record of prior admission to this hospital.        [place] Strauss / PLAN:      Sepsis:  2/2 to right acute pyelonephritis, bacterial PNA  -IV ABT We will await blood culture and urine culture    Acute Right Pyelonephritis:   -IV ABT  -CT abd/pelv w 3/20: Findings in the right kidney likely representing pyelonephritis. There is mild dilatation of the right ureter. No evidence of nephro lithiasis. Uterine fibroid. T2DM: ISS, accuchecks ac&hs, DM educator. Hyponatremia: 2/2 to hyperglycemia. IVF support. Sinus Tachycardia: 2/2 to sepsis. Bacterial PNA:   -CXR 3/23: Worsening bilateral interstitial and airspace process could be due to  worsening pulmonary edema. Bilateral pneumonia could have similar appearance and  be superimposed upon the pulmonary edema. New small pleural effusions left greater than right with atelectasis in the LLL. -CTA chest 3/20: No evidence of pulmonary embolus. Interval development of small bilateral pleural effusions. Interval development of diffuse bibasilar airspace opacities with some patchy ground glass opacities in the bilateral upper lobes. Possibilities  include Covid 19 pneumonia or possibly aspiration given the extensive symmetric  bibasilar distribution. Small enhancing foci in the liver which were not definitely seen on the previous CT. These may represent perfusion abnormalities versus changes  secondary to steatohepatitis given the apparent steatosis. ADDITIONAL CARE RECOMMENDATIONS:   You have been deemed stable for discharge and are being discharged home. You have been prescribed antibiotics, complete entire course. Should you need medication refills beyond what we have prescribed for you, you will need to contact your primary care provider for refills.     Return to the ER or notify your primary care office if you have a fever greater than 101.5 associated with chills, chest pain, or signs and symptoms of a stroke which are:  B E F A S T  -loss of balance, headaches or dizziness  -eyes blurred vision (sudden)  -asymmetrical facial symmetry (side of face droops)  -arms and leg weakness  -slurred speech / difficulty speaking  -if you experience any of these (time to call for an ambulance)     PENDING TEST RESULTS:   At the time of discharge the following test results are still pending: none. FOLLOW UP APPOINTMENTS:    Follow-up Information     Follow up With Specialties Details Why Contact Info    Maricarmen Nogueira MD Family Medicine Schedule an appointment as soon as possible for a visit in 3 days  Anton King  [address] [address] [phone]      [address] [facility] Emergency Medicine  If symptoms worsen [address] [facility]    Maricarmen Nogueira MD Family Medicine In 3 days PCP Anton 13  [address] [facility]  [address]  [phone]      Program for [facility]  In 1 week Diabetes [address]   [phone]             DIET: Diabetic Diet    ACTIVITY: Activity as tolerated and no driving for today      DISCHARGE MEDICATIONS:  Current Discharge Medication List      START taking these medications    Details   Blood-Glucose Meter monitoring kit Use to test BGL AC & HS. Dispense 100 strips and 100 lancets. Qty: 1 Kit, Refills: 0      L.acid,para-B. bifidum-S.therm (RISAQUAD) 8 billion cell cap cap Take 1 Cap by mouth daily. Qty: 30 Cap, Refills: 0      ciprofloxacin HCl (CIPRO) 500 mg tablet Take 1 Tab by mouth two (2) times a day. Qty: 14 Tab, Refills: 0      naproxen (Naprosyn) 500 mg tablet Take 1 Tab by mouth two (2) times daily (with meals) for 10 days. Qty: 20 Tab, Refills: 0         CONTINUE these medications which have CHANGED    Details   metFORMIN (GLUCOPHAGE) 500 mg tablet Take 1 Tab by mouth two (2) times daily (with meals). Qty: 60 Tab, Refills: 0         CONTINUE these medications which have NOT CHANGED    Details   insulin NPH/insulin regular (NOVOLIN 70/30, HUMULIN 70/30) 100 unit/mL (70-30) injection 25 units sc in am, 20 units sc in pm  Qty: 10 mL, Refills: 5    Comments:  This is the correct insulin dose  Associated Diagnoses: Uncontrolled type 2 diabetes mellitus with hyperglycemia (HCC)      lisinopril (PRINIVIL, ZESTRIL) 5 mg tablet Take 1 Tab by mouth daily. Qty: 90 Tab, Refills: 3    Associated Diagnoses: Hypertension, unspecified type      Insulin Syringe-Needle U-100 (INSULIN SYRINGE) 0.5 mL 30 gauge x 5/16\" syrg Use twice a day for insulin administration ICD 10: E11.9  Qty: 100 Syringe, Refills: 3    Associated Diagnoses: Uncontrolled type 2 diabetes mellitus with hyperglycemia (HCC)         STOP taking these medications       cephALEXin (KEFLEX) 500 mg capsule Comments:   Reason for Stopping:                 NOTIFY YOUR PHYSICIAN FOR ANY OF THE FOLLOWING:   Fever over 101 degrees for 24 hours. Chest pain, shortness of breath, fever, chills, nausea, vomiting, diarrhea, change in mentation, falling, weakness, bleeding. Severe pain or pain not relieved by medications. Or, any other signs or symptoms that you may have questions about. DISPOSITION:  X  Home With: family   OT  PT  HH  RN       Long term SNF/Inpatient Rehab    Independent/assisted living    Hospice    Other:       PATIENT CONDITION AT DISCHARGE:     Functional status    Poor     Deconditioned    X Independent      Cognition    X Lucid     Forgetful     Dementia      Catheters/lines (plus indication)    Montoya     PICC     PEG    X None      Code status   X  Full code     DNR      PHYSICAL EXAMINATION AT DISCHARGE:    Constitutional:  No acute distress, cooperative, pleasant    ENT:  Oral mucosa moist.    Resp:  CTA bilaterally. No wheezing/rhonchi/rales. No accessory muscle use. CV:  Regular rhythm, normal rate, S1,S2.    GI/:  Soft, non distended, non tender, no guarding, BS present. Voids Freely. Musculoskeletal:  No edema, warm, 2+ pulses throughout. Neurologic:  Moves all extremities. AAOx3, CN II-XII reviewed. Skin:  Good turgor, no rashes or ulcers  Psych:  Good insight, Not anxious nor agitated.                CHRONIC MEDICAL DIAGNOSES:  Problem List as of 3/24/2021 Date Reviewed: 3/20/2021          Codes Class Noted - Resolved    * (Principal) Sepsis ([facility] [address] ICD-10-CM: A41.9  ICD-9-CM: 038.9, 995.91  3/19/2021 - Present              Greater than 30 minutes were spent with the patient on counseling and coordination of care    Signed:   Tonie Pickard NP  3/24/2021  11:30 AM

## 2021-03-24 NOTE — PROGRESS NOTES
Hospital follow-up new PCP transitional care appointment has been scheduled with Bassam Potter for Friday, 4/2/21 at 9:00 a.m. Pending patient discharge.   Christian Tracy, Care Management Specialist.

## 2021-03-24 NOTE — PROGRESS NOTES
Provided remote interpreting to Pt and RN, Ana Laura Guerin during discharge instructions.     Oanh Kiran

## 2021-03-24 NOTE — PROGRESS NOTES
Bedside and Verbal shift change report given to Carmen Vasquez RN (oncoming nurse) by Lisa Grace RN (offgoing nurse). Report included the following information SBAR, Kardex, Intake/Output, MAR, Recent Results and Med Rec Status.

## 2021-03-24 NOTE — DISCHARGE INSTRUCTIONS
Discharge Instructions       PATIENT ID: Ryann Parisi  MRN: [id number]   YOB: 1980    DATE OF ADMISSION: 3/19/2021  6:08 PM    DATE OF DISCHARGE: 3/24/2021    PRIMARY CARE PROVIDER: Cj Alston MD     ATTENDING PHYSICIAN: Brian Vance MD  DISCHARGING PROVIDER: Nicanor Painter NP    To contact this individual call [phone] and ask the  to page. If unavailable ask to be transferred the Adult Hospitalist Department. DISCHARGE DIAGNOSES: Sepsis, Acute Right Pyelonephritis, T2DM, Hyponatremia, ST, Bacterial PNA    CONSULTATIONS: IP CONSULT TO INFECTIOUS DISEASES    PROCEDURES/SURGERIES: * No surgery found *    PENDING TEST RESULTS:   At the time of discharge the following test results are still pending: none    FOLLOW UP APPOINTMENTS:   Follow-up Information     Follow up With Specialties Details Why Contact Info    Cj Alston MD Family Medicine Schedule an appointment as soon as possible for a visit in 3 days  [address]  [address] [facility] [address] [address]  [phone]      [address] [facility] Emergency Medicine  If symptoms worsen [address] [facility]    Cj Alston MD Family Medicine In 3 days PCP [address]  [address]  [phone]             ADDITIONAL CARE RECOMMENDATIONS:     You have been deemed stable for discharge and are being discharged home. You have been prescribed antibiotics, complete entire course. Should you need medication refills beyond what we have prescribed for you, you will need to contact your primary care provider for refills.     Return to the ER or notify your primary care office if you have a fever greater than 101.5 associated with chills, chest pain, or signs and symptoms of a stroke which are:  B E F A S T  -loss of balance, headaches or dizziness  -eyes blurred vision (sudden)  -asymmetrical facial symmetry (side of face droops)  -arms and leg weakness  -slurred speech / difficulty speaking  -if you experience any of these (time to call for an ambulance)     DIET: Diabetic Diet      ACTIVITY: Activity as tolerated and no driving for today      DISCHARGE MEDICATIONS:   See Medication Reconciliation Form    · It is important that you take the medication exactly as they are prescribed. · Keep your medication in the bottles provided by the pharmacist and keep a list of the medication names, dosages, and times to be taken in your wallet. · Do not take other medications without consulting your doctor. NOTIFY YOUR PHYSICIAN FOR ANY OF THE FOLLOWING:   Fever over 101 degrees for 24 hours. Chest pain, shortness of breath, fever, chills, nausea, vomiting, diarrhea, change in mentation, falling, weakness, bleeding. Severe pain or pain not relieved by medications. Or, any other signs or symptoms that you may have questions about. DISPOSITION:  X  Home With: family   OT  PT  HH  RN       SNF/Inpatient Rehab/LTAC    Independent/assisted living    Hospice    Other:     Patient Education   Patient Education   Patient Education        Aprenda sobre el uso seguro de los antibióticos  Learning About the Safe Use of Antibiotics  Introducción    Los antibióticos son medicamentos que se utilizan para [address] [place] bacterias. Las bacterias pueden causar infecciones. Estas incluyen faringitis estreptocócica, infecciones del oído y neumonía. Estos medicamentos no pueden curarlo todo. No destruyen los virus ni ayudan con las Lottie. No ayudan con enfermedades edmar el resfriado común, la gripe ni el goteo nasal. Y pueden causar efectos secundarios. Hay muchos tipos de antibióticos. Cao médico determinará cuál funcionará mejor para cao infección. Los ejemplos incluyen:  · Amoxicilina. · Cefalexina (Keflex). · Ciprofloxacina (Cipro). ¿Cuáles son Milon Sages?   Los efectos secundarios pueden incluir:  · Náuseas. · Diarrea. · Salpullido. · Infección por cándida (hongos en forma de levadura). · Renetta reacción alérgica grave. Esta puede causar [place] y problemas respiratorios. [place] es poco frecuente. Usted puede tener otros efectos secundarios o reacciones que no se enumeran aquí. Revise la información que viene con cao medicamento. ¿Debería usted melany antibióticos por si acaso? No tome antibióticos cuando no los necesita. Si lo hace, es posible que no le den resultado cuando VoxFeed. Cada vez que lisseth antibióticos, hay más probabilidades de que algunas de las bacterias sobrevivan y que el medicamento no las elimine. Las bacterias que no mueren pueden [place] y volverse más difíciles de eliminar. Estas se llaman bacterias resistentes a los antibióticos. Pueden causar infecciones Antoinette Gunner y [place] graves. Para tratarlas, usted puede necesitar antibióticos diferentes y [place] potentes que tienen más efectos secundarios y pueden costar más. Así que pregúntele siempre a cao médico si los antibióticos son el mejor tratamiento. Explíquele que usted no quiere antibióticos a no ser [employer]. Ayude a proteger a cao comunidad  Usar antibióticos cuando no se necesitan da lugar al desarrollo de bacterias resistentes a los antibióticos. Estas bacterias más resistentes pueden transmitirse a familiares, niños y compañeros de [place]. Las personas en cao comunidad correrán el riesgo de tener renetta infección que es más difícil de curar y [place] costosa de tratar. ¿Cómo puede melany antibióticos en forma campos? [place] con los medicamentos. [place] los antibióticos según las indicaciones. No deje de tomarlos solo porque se sienta mejor. Usted debe melany todos los [facility]. [place] ayudará a asegurar que la infección se cure. También ayudará a evitar la propagación de bacterias resistentes a los antibióticos.   [place] siempre la cantidad exacta que se indica en la etiqueta. Si la etiqueta indica que tome el medicamento a renetta hora determinada, siga esas instrucciones. Es posible que se sienta mejor después de melany el antibiótico analilia unos pocos días. Joao es importante que siga tomándolo analilia el tiempo que se la haya recetado. [place] le ayudará a eliminar las bacterias que son Bobie Files y que sobreviven a los primeros george del tratamiento. ¿Dónde puede encontrar más información en [facility]. [facility]? Vaya a [email or web address]  Lawerance Hiss P653 en la búsqueda para aprender más acerca de \"Aprenda sobre el uso seguro de los antibióticos. \"  Revisado: 11 febrero, 2020               Versión del contenido: 12.6  © 9404-1617 Healthwise, Incorporated. Las instrucciones de cuidado fueron adaptadas bajo licencia por [employer] (which disclaims liability or warranty for this information). Si usted tiene [place] afección médica o sobre estas instrucciones, siempre pregunte a cao profesional de sean. Healthwise, Incorporated niega toda garantía o responsabilidad por cao uso de esta información. Aprenda acerca de las pautas alimentarias para diabetes  Learning About Diabetes Food Guidelines  Instrucciones de cuidado    La planificación de las comidas es importante para el manejo de la diabetes. Ayuda a mantener el azúcar en la karolina en el nivel ideal (que usted fija con cao médico). Usted no tiene que comer alimentos especiales. Puede comer lo mismo que cao denisa, incluso dulces de vez en cuando. Joao debe prestar atención a la cantidad y la frecuencia con que come ciertos alimentos. Evangelista vez desee colaborar con un dietista o educador de diabetes certificado (CDE, por merlin siglas en inglés) para que le ayuden a planificar las comidas y los refrigerios. Un dietista o CDE también puede ayudarle a bajar de peso si ryan es daniel de merlin objetivos. ¿Qué debería saber acerca de ingerir carbohidratos?   Manejar la cantidad de carbohidratos que ingiere es renetta parte importante de la alimentación saludable cuando tiene diabetes. Los carbohidratos se encuentran en muchos alimentos. · Sepa qué alimentos contienen carbohidratos. Y aprenda qué cantidad de carbohidratos contienen los diferentes alimentos. ? El pan, los cereales, la pasta y el arroz tienen aproximadamente 15 gramos de carbohidratos por porción. Renetta porción equivale a 1 rebanada de pan (1 onza o 28 g), ½ taza de cereal cocido o 1/3 de taza de pasta o arroz cocidos. ? Las frutas tienen 15 gramos de carbohidratos por porción. Mercy Shutter porción es 1 fruta fresca pequeña, edmar renetta [place] o renetta naranja; ½ banana (plátano); ½ taza de fruta cocida o enlatada; ½ taza de jugo de fruta; 1 taza de melón o frambuesas; o 2 cucharadas de frutas secas. ? Jyothi Cindy y el yogur sin azúcar agregado tienen 15 gramos de carbohidratos por porción. Mercy Shutter porción es 1 taza de [place] o 2/3 taza de yogur sin azúcar agregado. ? Las verduras con almidón tienen 15 gramos de carbohidratos por porción. Renetta porción es ½ taza de puré de papa o camote (batata, boniato); 1 taza de calabacín; ½ papa horneada pequeña; ½ taza de frijoles cocidos; o ½ taza de maíz (elote) o arvejas (chícharos) cocidos. · Aprenda cuántos carbohidratos debe consumir cada día y en cada comida. Un dietista o CDE le puede enseñar cómo llevar la cuenta de los carbohidratos que consume. A esto se le llama recuento de carbohidratos. · Si no está seguro de cómo [employer] gramos de carbohidratos, utilice el Método del [place] para planificar las comidas. Es renetta Ozella Dock y rápida de asegurarse de que consuma comidas equilibradas. También le ayuda a distribuir los carbohidratos analilia el día. ? Divida el plato por tipo de alimento. Llene medio plato con verduras sin almidón, ponga carne u otras proteínas en renetta cuarta parte del plato y granos o verduras con almidón en el último cuarto del plato.  A esto puede agregarle un pequeño pedazo de fruta y 1 taza de Brian Marinette o yogur, según la cantidad de carbohidratos que deba consumir en renetta comida. · Trate de comer aproximadamente la misma cantidad de carbohidratos en cada comida. No \"reserve\" tom cantidad diaria de carbohidratos para consumirlos en renetta shira comida. · Las proteínas contienen muy pocos o nada de carbohidratos por porción. Los ejemplos de proteínas incluyen carne de res, Joselin heights, [place], [place], [place], tofu, Ignacio-barre, requesón (\"cottage cheese\") y la mantequilla de cacahuate [facility]). Renetta porción de carne son 3 onzas (85 g), lo cual es aproximadamente del tamaño de renetta baraja de naipes. Los ejemplos de porciones de sustitutos de la carne (equivalente a 1 onza o 28 g de carne) son 1/4 de taza de requesón, 1 huevo, 1 cucharada de [place] de cacahuate y ½ taza de tofu. ¿Cómo puede comer fuera y aún así comer de modo saludable? · Aprenda a calcular los tamaños de las porciones de alimentos que contienen carbohidratos. Si mide la comida en casa, será más fácil calcular la cantidad en renetta porción de comida de restaurante. · Si el platillo que pide contiene demasiados carbohidratos (edmar cisco, maíz o frijoles al horno), pida un alimento bajo en carbohidratos en tom lugar. Pida renetta ensalada o verduras. · Si [place] insulina, revise tom azúcar en la karolina antes y después de comer fuera para ayudarle a planear cuánto comer en el futuro. · Si usted come más carbohidratos de lo planeado en renetta comida, dé un paseo o yossi otro tipo de ejercicio. [place] ayudará a reducir el azúcar en la karolina. ¿Qué más debería saber? · Limite las grasas saturadas, edmar la grasa de la carne y productos lácteos. Esta es renetta opción saludable, porque las personas que tienen diabetes tienen un mayor riesgo de enfermedades del corazón. Así que elija caldeórn magros de carne y productos lácteos descremados o semidescremados. Utilice aceite de east o de canola en lugar de mantequilla o manteca al cocinar. · No se salte comidas.  Tom nivel de azúcar en la karolina puede bajar demasiado si usted se salta comidas y lisseth insulina o ciertos medicamentos para la diabetes. · Consulte con cao médico antes de beber alcohol. El alcohol puede hacer que cao azúcar en la karolina baje demasiado. El alcohol también puede causar renetta reacción adversa si usted lisseth ciertos medicamentos para la diabetes. La atención de seguimiento es renetta parte clave de cao tratamiento y seguridad. Asegúrese de hacer y acudir a todas las citas, y llame a cao médico si está teniendo problemas. También es renetta buena idea saber los resultados de merlin exámenes y mantener renetta lista de los medicamentos que lisseth. ¿Dónde puede encontrar más información en inglés? Yessenia Nicholas a [email or web address].gray.com/  Darwin [id number] en la búsqueda para aprender más acerca de \"Aprenda acerca de las pautas alimentarias para diabetes. \"  Revisado: 20 dicparulmbaneesh, 5554               [facility] del contenido: 12.6  © 2006-2020 Healthwise, Incorporated. Las instrucciones de cuidado fueron adaptadas bajo licencia por [employer] (which disclaims liability or warranty for this information). Si usted tiene Chariton Viking afección médica o sobre estas instrucciones, siempre pregunte a cao profesional de sean. Healthwise, Incorporated niega toda garantía o responsabilidad por cao uso de esta información. Neumonía: Instrucciones de cuidado  Pneumonia: Care Instructions  Instrucciones de cuidado    La neumonía es renetta infección de los pulmones. Isaikarly Dobbs de los casos son causados por infecciones debidas a bacterias o virus. La neumonía puede ser leve o muy grave. Si es causada por bacterias, será tratado con antibióticos. La recuperación completa de la neumonía podría melany [employer] o algunos meses, dependiendo de qué tan enfermo estuvo y si cao estado general de sean es suero. La atención de seguimiento es renetta parte clave de cao tratamiento y seguridad.  Asegúrese de hacer y acudir a todas las citas, y llame a cao médico si está teniendo problemas. También es renetta buena idea saber los resultados de merlin exámenes y mantener renetta lista de los medicamentos que lisseth. ¿Cómo puede cuidarse en el hogar? · [address] indicaciones. No deje de tomarlos por el hecho de sentirse mejor. Debe melany todos los antibióticos hasta terminarlos. · [employer] medicamentos exactamente edmar le fueron recetados. Llame a cao médico si kushal estar teniendo problemas con cao medicamento. · Descanse y duerma lo suficiente. Podría sentirse cansado y débil analilia un [address], joao cao nivel de energía mejorará con [place]. · Para prevenir la deshidratación, emily abundantes líquidos, los suficientes para que cao orina sea de color amarillo sharon o randy edmar el agua. Elija agua y otros líquidos nydia sin cafeína hasta que se sienta mejor. Si tiene renetta enfermedad del riñón, del corazón o del hígado y tiene que Daniele's líquidos, hable con cao médico antes de aumentar cao consumo. · Trate la tos para que pueda descansar. La tos con mucosidad (flema) de los pulmones es común con la neumonía. Es renetta de las [employer] que cao organismo [address]. Joao si la tos le impide descansar o le causa gran fatiga y dolor en la pared torácica, hable con cao médico. Podría sugerirle que tome un medicamento para aliviar la tos. · Utilice un vaporizador o humidificador para añadir humedad en cao habitación. Siga las indicaciones para limpiar el aparato. · No fume ni permita que otras personas fumen cerca de usted. Fumar hará que la tos dure [place]. Si necesita ayuda para dejar de fumar, hable con cao médico [employer] y medicamentos para dejar de fumar. Éstos pueden aumentar merlin probabilidades de dejar el hábito para siempre. · Elim un analgésico (medicamento para el dolor) de venta glendy, edmar acetaminofén (Tylenol), ibuprofeno (Advil, Motrin) o naproxeno (Aleve).  Diana y siga todas las instrucciones de la etiqueta. · No tome dos o más analgésicos al mismo tiempo a menos que el médico se lo haya indicado. Muchos analgésicos contienen acetaminofén, es decir, Tylenol. El exceso de acetaminofén (Tylenol) puede ser dañino. · Si le dieron un espirómetro para medir qué tan bunny están funcionando merlin pulmones, utilícelo edmar se lo indicaron. [place] puede ayudar a cao médico a saber cómo va cao recuperación. · Para prevenir la neumonía en el futuro, hable con cao médico acerca de vacunarse contra la gripe (renetta vez al año) y Olene Yoni antineumocócica (sólo renetta vez para la [address] [place] [address] Road). ¿Cuándo debe pedir ayuda? Llame al 911 en cualquier momento que considere que necesita atención de emergencia. Por ejemplo, llame si:    · Tiene serias dificultades para respirar. Llame a cao médico ahora mismo o busque atención médica inmediata si:    · Tose mucosidad (esputo) de color café oscuro o con karolina.     · Tiene nueva o peor dificultad para respirar.     · Siente mareos o aturdimiento, o que está a punto de [place]. Preste especial atención a los cambios en cao sean y asegúrese de comunicarse con cao médico si:    · Presenta fiebre o la fiebre es más bere.     · Cao tos es más profunda o más frecuente que antes.     · No está mejorando después de 2 días (48 horas).     · No mejora edmar se esperaba. ¿Dónde puede encontrar más información en inglés? Vaya a http://www.gray.com/  [place] [id number] en la búsqueda para aprender más acerca de \"Neumonía: Instrucciones de cuidado. \"  Revisado: 24 febrero, 2020               Versión del contenido: 12.6  © 3523-5973 [employer], App.net. Las instrucciones de cuidado fueron adaptadas bajo licencia por Good Help Connections (which disclaims liability or warranty for this information). Si usted tiene [place] afección médica o sobre estas instrucciones, siempre pregunte a cao profesional de sean.  Social Strategy 1, App.net niega tocharlie garcia o responsabilidad por cao uso de esta información.          Signed:   Cheryl Metzger NP  3/24/2021  11:39 AM

## 2021-03-24 NOTE — PROGRESS NOTES
1320: Discharge instructions reviewed with the patient with assistance of deon, . All questions answered. Patient being sent home with one scripts and two coupons for medications to be filled at [facility]. IV's to be removed once ride is here. Patient to be brought down to main entrance by wheelchair with belongings.

## 2021-03-24 NOTE — PROGRESS NOTES
GEENA:  1. RUR-8%  2. Patient is from home, Nicaraguan speaking. 3. Currently on 2L oxygen. 3. ID following- PO abx. CM noted patient is from home, she is currently is on oxygen. Cm will follow for any discharge needs. 1201- Patient is discharging home today, she is not requiring oxygen per RN. (note follow). CM Specialist to schedule pcp appointment. CM provided patient with good RX coupon.     Estella [facility]

## 2021-03-24 NOTE — PROGRESS NOTES
Bedside and Verbal shift change report given to Matteo Shaffer (oncoming nurse) by Maldonado Silverman (offgoing nurse). Report included the following information SBAR, Kardex, Intake/Output, MAR and Recent Results.

## 2021-03-24 NOTE — PROGRESS NOTES
1200: Pulse oximetry on room air is 94% post activity. Patient is in no signs of distress and no complaints at this time.

## 2021-03-25 ENCOUNTER — PATIENT OUTREACH (OUTPATIENT)
Dept: FAMILY MEDICINE CLINIC | Age: 41
End: 2021-03-25

## 2021-03-25 LAB
BACTERIA SPEC CULT: NORMAL
SERVICE CMNT-IMP: NORMAL

## 2021-03-25 NOTE — Clinical Note
Natalie,,  This patient worries me ( Sepsis from [place]; PNA). I talked to her a lot today, she knows I am out tomorrow and has the [phone] number - in case you happen to get a message from her. I think she's pretty high risk for readmission.    Thank you,  Dorothy Merchant

## 2021-03-25 NOTE — PROGRESS NOTES
Hospital Discharge Follow-Up      Date/Time:  3/25/2021 9:22 AM    Patient was admitted to [facility] on 3/19/21 and discharged on 3/24/21 for sepsis, pylonephritis, bacterial PNA and hyponatremia. Also T2DM, poorly controlled. The physician discharge summary was available at the time of outreach. Patient was contacted within 1 business days of discharge. Spoke to patient, assisted by Nancy Bose       Top Challenges reviewed with the provider   UTI/Pylo - Ecoli  K+ 3.1, dod  WBC: 5.6  A1c: 11.6 on 3/21/21    CXR 3/23/21: 1. Worsening bilateral interstitial and airspace process could be due to worsening pulmonary edema. Bilateral pneumonia could have similar appearance and be superimposed upon the pulmonary edema. 2. New small pleural effusions left greater than right with atelectasis in the  left lower lobe. Received IV lasix 40mg on 3/23/31    ID consult: see last note on 3/23/31;  Dc'don Cipro and probiotic    Neg Covid test 3/20/21    Last seen [facility], 3/31/20         Method of communication with provider :chart routing      Was this a readmission? no   Patient stated reason for the readmission: n/a    Nurse Navigator (NN) contacted the patient by telephone to perform post hospital discharge assessment. Verified name and  with patient as identifiers. Provided introduction to self, and explanation of the Nurse Navigator role. Reviewed discharge instructions and red flags with patient who verbalized understanding. Patient given an opportunity to ask questions and does not have any further questions or concerns at this time. The patient agrees to contact the PCP office for questions related to their healthcare. NN provided contact information for future reference. Disease Specific:   Sepsis    Summary of patient's top problems:  1.  Sepsis: denies fever sxs ( no thermometer); denies sob;  Rt to Ed for fever, Sob, worsening abdominal or back pain, dysuria, diarrhea, vomiting, weakness. Stressed importance of completing abx and getting the probiotic to protect GI  ( C-dif prevention)  2. Bacterial PNA - denies any difficulty breathing or fever sxs; has not gotten abx yet but states her dtr is getting today. No cough or audible sob during call . RT ED if any difficulty breathing,  Wheezing, chest tightness or new cough. 3. Abdominal pain-denies N/V/D;  Last BM about 3d ago in the hospital.  Advised increase water and try stool softener;  Later in day C/o some nausea today, has not vomited. Suggested not taking abx on empty stomach - eat something bland prior to taking abx, ex: crackers, toast   4. Hyponatremia- reviewed K+ rich foods with patient  5. Poorly controlled diabetes:  confirms she has supplies for checking BS -advised to check FBSs and several before meals prior to see doctor on 3/29. Bring record of readings to appt. on 3/29/21. Pt met with Diabetes Clin Nurse Spec from [facility] on 3/23/21 while [address] orders at discharge: none     Barriers to care? financial, lack of knowledge about disease, level of motivation, medication management, PCP relationship    Advance Care Planning:   Does patient have an Advance Directive:  not on file     Medication(s):   New Medications at Discharge: cipro, Lisinopril, L.acid,para-B. bifidum-  S.therm  Changed Medications at Discharge: Metformin 500mg am and pm with meals    Medication reconciliation was performed with patient, who verbalizes understanding of administration of home medications. · Pt has not gotten her Lisinopril or probiotic yet but states dtr is getting today. · NN explained reason for each medication and reviewed instructions for each. · This morning pt took 20u 70/30, reviewed that rx is 25 in am 20 in pm.      Patient denies seeing any other doctors since last seen by Inna Saunders provider 3/2020. States she had not run out of medicine.   She is not familiar with [facility] ( noted on AVS for follow up) and wants to continue with the [address]. NN advised to bring all meds with her to appt on 3/29/21. Referral to Pharm D needed: no     Current Outpatient Medications   Medication Sig    metFORMIN (GLUCOPHAGE) 500 mg tablet Take 1 Tab by mouth two (2) times daily (with meals).  ciprofloxacin HCl (CIPRO) 500 mg tablet Take 1 Tab by mouth two (2) times a day.  naproxen (Naprosyn) 500 mg tablet Take 1 Tab by mouth two (2) times daily (with meals) for 10 days.  insulin NPH/insulin regular (NOVOLIN 70/30, HUMULIN 70/30) 100 unit/mL (70-30) injection 25 units sc in am, 20 units sc in pm    Blood-Glucose Meter monitoring kit Use to test BGL AC & HS. Dispense 100 strips and 100 lancets.  L.acid,para-B. bifidum-S.therm (RISAQUAD) 8 billion cell cap cap Take 1 Cap by mouth daily.  lisinopril (PRINIVIL, ZESTRIL) 5 mg tablet Take 1 Tab by mouth daily.  Insulin Syringe-Needle U-100 (INSULIN SYRINGE) 0.5 mL 30 gauge x 5/16\" syrg Use twice a day for insulin administration ICD 10: E11.9     No current facility-administered medications for this visit. There are no discontinued medications.     [facility] follow up appointment(s):   Future Appointments   Date Time Provider Altagracia Kelly   3/29/2021 11:30 AM Jesus Manuel Smith MD [facility] AMB      Non-[facility] follow up appointment(s): none

## 2021-03-29 ENCOUNTER — OFFICE VISIT (OUTPATIENT)
Dept: FAMILY MEDICINE CLINIC | Age: 41
End: 2021-03-29

## 2021-03-29 ENCOUNTER — HOSPITAL ENCOUNTER (OUTPATIENT)
Dept: LAB | Age: 41
Discharge: HOME | End: 2021-03-29

## 2021-03-29 VITALS
SYSTOLIC BLOOD PRESSURE: 156 MMHG | HEIGHT: 64 IN | WEIGHT: 164 LBS | HEART RATE: 89 BPM | TEMPERATURE: 98.2 F | DIASTOLIC BLOOD PRESSURE: 78 MMHG | OXYGEN SATURATION: 98 % | BODY MASS INDEX: 28 KG/M2

## 2021-03-29 DIAGNOSIS — N12 PYELONEPHRITIS: ICD-10-CM

## 2021-03-29 DIAGNOSIS — A41.9 SEPSIS, DUE TO UNSPECIFIED ORGANISM, UNSPECIFIED WHETHER ACUTE ORGAN DYSFUNCTION PRESENT (HCC): Primary | ICD-10-CM

## 2021-03-29 DIAGNOSIS — E11.65 UNCONTROLLED TYPE 2 DIABETES MELLITUS WITH HYPERGLYCEMIA (HCC): ICD-10-CM

## 2021-03-29 LAB — GLUCOSE POC: NORMAL MG/DL

## 2021-03-29 PROCEDURE — 80053 COMPREHEN METABOLIC PANEL: CPT

## 2021-03-29 PROCEDURE — 84100 ASSAY OF PHOSPHORUS: CPT

## 2021-03-29 PROCEDURE — 85025 COMPLETE CBC W/AUTO DIFF WBC: CPT

## 2021-03-29 PROCEDURE — 84443 ASSAY THYROID STIM HORMONE: CPT

## 2021-03-29 PROCEDURE — 99213 OFFICE O/P EST LOW 20 MIN: CPT | Performed by: FAMILY MEDICINE

## 2021-03-29 PROCEDURE — 82043 UR ALBUMIN QUANTITATIVE: CPT

## 2021-03-29 PROCEDURE — 82962 GLUCOSE BLOOD TEST: CPT | Performed by: FAMILY MEDICINE

## 2021-03-29 PROCEDURE — 80061 LIPID PANEL: CPT

## 2021-03-29 PROCEDURE — 87086 URINE CULTURE/COLONY COUNT: CPT

## 2021-03-29 PROCEDURE — 81001 URINALYSIS AUTO W/SCOPE: CPT

## 2021-03-29 NOTE — PROGRESS NOTES
HISTORY OF PRESENT ILLNESS  Adria Nageotte M Edmundo Civatte is a 42 y.o. female. HPI  Patient states she was admitted to the hospital with severe right flank pain,  She then had a very high fever in the hospital,  She had some tests and was diagnosed with pyelonephritis and pneumonia. Her phosphorus was low. She started feeling better once she was started on antibiotics and started feeling better. Denies nausea vomiting, fever,  She is still presenting some low abdominal pain. No pain to urinate. Patient admits she had not been using the insulin  Review of Systems   Constitutional: Negative for chills. HENT: Negative for congestion and sinus pain. Respiratory: Negative for cough and sputum production. Cardiovascular: Negative for chest pain, palpitations, orthopnea and claudication. Gastrointestinal: Negative for abdominal pain, nausea and vomiting. Genitourinary: Negative for dysuria, frequency and urgency. Musculoskeletal: Negative for back pain, myalgias and neck pain. BP (!) 156/78 (BP 1 Location: Right arm, BP Patient Position: Sitting)   Pulse 89   Temp 98.2 °F (36.8 °C) (Temporal)   Ht 5' 3.78\" (1.62 m)   Wt 164 lb (74.4 kg)   LMP 03/20/2021   SpO2 98%   BMI 28.35 kg/m²   Physical Exam  HENT:      Head: Normocephalic. Right Ear: Tympanic membrane normal.      Left Ear: Tympanic membrane normal.      Nose: No congestion. Eyes:      Pupils: Pupils are equal, round, and reactive to light. Cardiovascular:      Rate and Rhythm: Normal rate. Heart sounds: No murmur. Pulmonary:      Effort: Pulmonary effort is normal. No respiratory distress. Breath sounds: No wheezing, rhonchi or rales. Musculoskeletal: Normal range of motion. General: No swelling. Neurological:      Mental Status: She is alert. ASSESSMENT and PLAN  Diagnoses and all orders for this visit:    1.  Sepsis, due to unspecified organism, unspecified whether acute organ dysfunction present ([facility] [address]    2. Uncontrolled type 2 diabetes mellitus with hyperglycemia (HCC)  -     AMB POC GLUCOSE BLOOD, BY GLUCOSE MONITORING DEVICE  -     METABOLIC PANEL, COMPREHENSIVE; Future  -     CBC WITH AUTOMATED DIFF; Future  -     LIPID PANEL; Future  -     TSH 3RD GENERATION; Future  -     PHOSPHORUS; Future    3. Pyelonephritis  -     URINALYSIS W/ RFLX MICROSCOPIC; Future  -     CULTURE, URINE; Future  -     MICROALBUMIN, UR, RAND W/ MICROALB/CREAT RATIO; Future      42year old female with recent hospital admission for pyelonephritis, she developed sepsis.   She was then discharged home with cipro which she is still taking,  Feeling better we will repeat labs today  Follow up in 3 months  COVID -19 vaccine scheduled

## 2021-03-29 NOTE — PROGRESS NOTES
I have printed AVS and reviewed it with patient today. [facility]  Mckayla Triplett RN

## 2021-03-29 NOTE — PROGRESS NOTES
Coordination of Care  1. Have you been to the ER, urgent care clinic since your last visit? Hospitalized since your last visit? Yes When: [address] [place] [address], 03/16/201, kidney problem    2. Have you seen or consulted any other health care providers outside of the [address] [place] [address] since your last visit? Include any pap smears or colon screening. NO      Does the patient need refills? YES    Learning Assessment Complete?  yes  Depression Screening complete in the past 12 months? yes  Results for orders placed or performed in visit on 03/29/21   AMB POC GLUCOSE BLOOD, BY GLUCOSE MONITORING DEVICE   Result Value Ref Range    Glucose POC nf 152 MG/DL

## 2021-03-30 LAB
ALBUMIN SERPL-MCNC: 3.1 G/DL (ref 3.5–5)
ALBUMIN/GLOB SERPL: 0.7 {RATIO} (ref 1.1–2.2)
ALP SERPL-CCNC: 155 U/L (ref 45–117)
ALT SERPL-CCNC: 23 U/L (ref 12–78)
ANION GAP SERPL CALC-SCNC: 5 MMOL/L (ref 5–15)
APPEARANCE UR: CLEAR
AST SERPL-CCNC: 15 U/L (ref 15–37)
BACTERIA URNS QL MICRO: NEGATIVE /HPF
BASOPHILS # BLD: 0 K/UL (ref 0–0.1)
BASOPHILS NFR BLD: 1 % (ref 0–1)
BILIRUB SERPL-MCNC: 0.3 MG/DL (ref 0.2–1)
BILIRUB UR QL: NEGATIVE
BUN SERPL-MCNC: 11 MG/DL (ref 6–20)
BUN/CREAT SERPL: 20 (ref 12–20)
CALCIUM SERPL-MCNC: 9.5 MG/DL (ref 8.5–10.1)
CHLORIDE SERPL-SCNC: 102 MMOL/L (ref 97–108)
CHOLEST SERPL-MCNC: 144 MG/DL
CO2 SERPL-SCNC: 29 MMOL/L (ref 21–32)
COLOR UR: ABNORMAL
CREAT SERPL-MCNC: 0.56 MG/DL (ref 0.55–1.02)
CREAT UR-MCNC: 20.2 MG/DL
DIFFERENTIAL METHOD BLD: ABNORMAL
EOSINOPHIL # BLD: 0.2 K/UL (ref 0–0.4)
EOSINOPHIL NFR BLD: 2 % (ref 0–7)
EPITH CASTS URNS QL MICRO: ABNORMAL /LPF
ERYTHROCYTE [DISTWIDTH] IN BLOOD BY AUTOMATED COUNT: 13.2 % (ref 11.5–14.5)
GLOBULIN SER CALC-MCNC: 4.5 G/DL (ref 2–4)
GLUCOSE SERPL-MCNC: 114 MG/DL (ref 65–100)
GLUCOSE UR STRIP.AUTO-MCNC: NEGATIVE MG/DL
HCT VFR BLD AUTO: 40.5 % (ref 35–47)
HDLC SERPL-MCNC: 37 MG/DL
HDLC SERPL: 3.9 {RATIO} (ref 0–5)
HGB BLD-MCNC: 12.4 G/DL (ref 11.5–16)
HGB UR QL STRIP: ABNORMAL
IMM GRANULOCYTES # BLD AUTO: 0.1 K/UL (ref 0–0.04)
IMM GRANULOCYTES NFR BLD AUTO: 1 % (ref 0–0.5)
KETONES UR QL STRIP.AUTO: NEGATIVE MG/DL
LDLC SERPL CALC-MCNC: 75.6 MG/DL (ref 0–100)
LEUKOCYTE ESTERASE UR QL STRIP.AUTO: NEGATIVE
LIPID PROFILE,FLP: ABNORMAL
LYMPHOCYTES # BLD: 1 K/UL (ref 0.8–3.5)
LYMPHOCYTES NFR BLD: 11 % (ref 12–49)
MCH RBC QN AUTO: 26.1 PG (ref 26–34)
MCHC RBC AUTO-ENTMCNC: 30.6 G/DL (ref 30–36.5)
MCV RBC AUTO: 85.3 FL (ref 80–99)
MICROALBUMIN UR-MCNC: 0.61 MG/DL
MICROALBUMIN/CREAT UR-RTO: 30 MG/G (ref 0–30)
MONOCYTES # BLD: 0.4 K/UL (ref 0–1)
MONOCYTES NFR BLD: 5 % (ref 5–13)
NEUTS SEG # BLD: 6.8 K/UL (ref 1.8–8)
NEUTS SEG NFR BLD: 80 % (ref 32–75)
NITRITE UR QL STRIP.AUTO: NEGATIVE
NRBC # BLD: 0 K/UL (ref 0–0.01)
NRBC BLD-RTO: 0 PER 100 WBC
PH UR STRIP: 7.5 [PH] (ref 5–8)
PHOSPHATE SERPL-MCNC: 3 MG/DL (ref 2.6–4.7)
PLATELET # BLD AUTO: 447 K/UL (ref 150–400)
PMV BLD AUTO: 11.4 FL (ref 8.9–12.9)
POTASSIUM SERPL-SCNC: 4.7 MMOL/L (ref 3.5–5.1)
PROT SERPL-MCNC: 7.6 G/DL (ref 6.4–8.2)
PROT UR STRIP-MCNC: NEGATIVE MG/DL
RBC # BLD AUTO: 4.75 M/UL (ref 3.8–5.2)
RBC #/AREA URNS HPF: ABNORMAL /HPF (ref 0–5)
SODIUM SERPL-SCNC: 136 MMOL/L (ref 136–145)
SP GR UR REFRACTOMETRY: 1.01 (ref 1–1.03)
TRIGL SERPL-MCNC: 157 MG/DL (ref ?–150)
TSH SERPL DL<=0.05 MIU/L-ACNC: 2.47 UIU/ML (ref 0.36–3.74)
UROBILINOGEN UR QL STRIP.AUTO: 0.2 EU/DL (ref 0.2–1)
VLDLC SERPL CALC-MCNC: 31.4 MG/DL
WBC # BLD AUTO: 8.4 K/UL (ref 3.6–11)
WBC URNS QL MICRO: ABNORMAL /HPF (ref 0–4)

## 2021-03-31 LAB
BACTERIA SPEC CULT: NORMAL
SERVICE CMNT-IMP: NORMAL

## 2021-03-31 NOTE — PROGRESS NOTES
Normal phosphorus, normal blood count,  Normal kidney function, liver function and electrolytes  Normal thyroid function test  Still waiting on culture  Patient can be informed

## 2021-04-06 NOTE — PROGRESS NOTES
Int # [id number]. Tc to the pt she. The pt was given the providers full lab result message. The pt had no questions. The urine culture is back. Pt told no growth. The pt has no further questions.  Carmel Cabrera RN

## 2021-08-09 NOTE — PROGRESS NOTES
[facility] Adult  Hospitalist Group                                                                                          Hospitalist Progress Note  Carole Ellington MD  Answering service: [phone] from in house phone        Date of Service:  3/21/2021  NAME:  Edward Barajas  :  1980  MRN:  [id number]      Please note that this dictation was completed with [employer], the computer voice recognition software. Quite often unanticipated grammatical, syntax, homophones, and other interpretive errors are inadvertently transcribed by the computer software. Please disregard these errors. Please excuse any errors that have escaped final proofreading. Admission Summary: This is a 50-year-old woman with past medical history significant for type 2 diabetes, who was in her usual state of health until 2 days ago when the patient developed abdominal pain. The pain is located at the right lower quadrant with radiation to the right groin. The pain is constant, dull ache, 6/10 in severity, associated with nausea but no vomiting. The patient also complained of fever, rigors and chills as well as dysuria. The patient came to the emergency room for further evaluation. When the patient arrived at the emergency room, her urinalysis was consistent with urinary tract infection. CT scan shows evidence of pyelonephritis. The patient was about to be discharged from the emergency room on oral antibiotics. The patient then developed chills, rigors as well as tachycardia. The sepsis protocol was then initiated. The patient received fluid therapy and antibiotics therapy as per sepsis protocol and was referred to the hospitalist service for evaluation for admission.   No record of prior admission to this hospital.       Interval history / Subjective:     PATIENT seen and examined today   She is feeling better today   She is having a dry mouth   I talked to her with help of google Latvian  susana      O2 can  Be weaned off      Assessment & Plan:        Severe sepsis. with evidence of tachycardia, fever and tachypnea. Patient is on IV vancomycin and Zosyn. No leukocytosis seen CT of the abdomen with the possibility of pyelonephritis  Urine culture positive for gram-negative rods  Antibiotics switched from zosyn to waqas and her fevers got better   ID consulted   Stop vancomycin         Worsening tachypnea and hypoxia   chest x-ray may show some evidence of fluid overload. CT chest with fluid overload   covid negative   No PE   It is SIRS and her hypoxia will improve  Hold onto the lasix yet , we stopped her fluids        Hypotension   responsive to fluids      Diabetes mellitus type 2  Give her Lantus and start sliding scale  . Accu-Cheks  Increased lantus to 36           Code status: FULL  DVT prophylaxis:     Care Plan discussed with: Patient/Family  Anticipated Disposition: Home w/Family  Anticipated Discharge: 24 hours to 48 hours     Hospital Problems  Date Reviewed: 3/20/2021          Codes Class Noted POA    * (Principal) Sepsis ([facility] [address] ICD-10-CM: A41.9  ICD-9-CM: 038.9, 995.91  3/19/2021 Yes                Review of Systems:   A comprehensive review of systems was negative except for that written in the HPI. Vital Signs:    Last 24hrs VS reviewed since prior progress note. Most recent are:  Visit Vitals  /63   Pulse (!) 111   Temp 98.9 °F (37.2 °C)   Resp 18   Ht 5' 4\" (1.626 m)   Wt 85.3 kg (188 lb 0.8 oz)   SpO2 97%   BMI 32.28 kg/m²         Intake/Output Summary (Last 24 hours) at 3/21/2021 1159  Last data filed at 3/21/2021 8856  Gross per 24 hour   Intake 1320 ml   Output    Net 1320 ml        Physical Examination:     I had a face to face encounter with this patient and independently examined them on 03/21/21 as outlined below:        Constitutional:  No acute distress, cooperative, pleasant    ENT:  Oral mucosa moist, oropharynx benign. Resp:  CTA bilaterally. [Time Spent: ___ minutes] : I have spent [unfilled] minutes of time on the encounter. No wheezing/rhonchi/rales. No accessory muscle use   CV:  Regular rhythm, normal rate, no murmurs, gallops, rubs    GI:  DIFFUSE ABD TENDERNESS     Musculoskeletal:  No edema, warm, 2+ pulses throughout    Neurologic:  Moves all extremities. AAOx3, CN II-XII reviewed            Data Review:    Review and/or order of clinical lab test      Labs:     Recent Labs     03/21/21  0534 03/19/21  1720   WBC 8.1 10.8   HGB 11.3* 13.6   HCT 34.1* 40.4   * 189     Recent Labs     03/21/21  0534 03/20/21  0912 03/20/21  0308    130* 133*   K 3.6 3.6 3.4*   * 104 103   CO2 17* 18* 21   BUN 14 18 15   CREA 0.54* 0.77 0.84   * 294* 263*   CA 7.9* 7.2* 7.8*   MG 2.0  --   --    PHOS 1.4*  --   --      Recent Labs     03/20/21  0308 03/19/21  1720   ALT 21 19    116   TBILI 0.6 0.6   TP 6.5 8.2   ALB 2.4* 3.1*   GLOB 4.1* 5.1*   LPSE 81 101     No results for input(s): INR, PTP, APTT, INREXT in the last 72 hours. No results for input(s): FE, TIBC, PSAT, FERR in the last 72 hours. No results found for: FOL, RBCF   No results for input(s): PH, PCO2, PO2 in the last 72 hours.   Recent Labs     03/20/21  0912 03/20/21  0308 03/19/21  1720   TROIQ <0.05 <0.05 <0.05     Lab Results   Component Value Date/Time    Cholesterol, total 149 02/19/2020 02:36 PM    HDL Cholesterol 55 02/19/2020 02:36 PM    LDL, calculated 77.6 02/19/2020 02:36 PM    Triglyceride 82 02/19/2020 02:36 PM    CHOL/HDL Ratio 2.7 02/19/2020 02:36 PM     Lab Results   Component Value Date/Time    Glucose (POC) 290 (H) 03/21/2021 11:03 AM    Glucose (POC) 239 (H) 03/21/2021 05:58 AM    Glucose (POC) 299 (H) 03/20/2021 09:28 PM    Glucose (POC) 297 (H) 03/20/2021 06:04 PM    Glucose (POC) 388 (H) 03/20/2021 01:37 PM     Lab Results   Component Value Date/Time    Color YELLOW/STRAW 03/19/2021 08:40 PM    Appearance CLEAR 03/19/2021 08:40 PM    Specific gravity 1.024 03/19/2021 08:40 PM    pH (UA) 5.5 03/19/2021 08:40 PM    Protein 30 (A) 03/19/2021 08:40 PM    Glucose >1,000 (A) 03/19/2021 08:40 PM    Ketone 80 (A) 03/19/2021 08:40 PM    Bilirubin Negative 03/19/2021 08:40 PM    Urobilinogen 0.2 03/19/2021 08:40 PM    Nitrites Positive (A) 03/19/2021 08:40 PM    Leukocyte Esterase TRACE (A) 03/19/2021 08:40 PM    Epithelial cells FEW 03/19/2021 08:40 PM    Bacteria 4+ (A) 03/19/2021 08:40 PM    WBC 20-50 03/19/2021 08:40 PM    RBC 0-5 03/19/2021 08:40 PM         Medications Reviewed:     Current Facility-Administered Medications   Medication Dose Route Frequency    vancomycin (VANCOCIN) 1,000 mg in 0.9% sodium chloride 250 mL (VIAL-MATE)  1,000 mg IntraVENous Q8H    sodium chloride (NS) flush 5-40 mL  5-40 mL IntraVENous Q8H    sodium chloride (NS) flush 5-40 mL  5-40 mL IntraVENous PRN    acetaminophen (TYLENOL) tablet 650 mg  650 mg Oral Q6H PRN    Or    acetaminophen (TYLENOL) suppository 650 mg  650 mg Rectal Q6H PRN    polyethylene glycol (MIRALAX) packet 17 g  17 g Oral DAILY PRN    promethazine (PHENERGAN) tablet 12.5 mg  12.5 mg Oral Q6H PRN    Or    ondansetron (ZOFRAN) injection 4 mg  4 mg IntraVENous Q6H PRN    enoxaparin (LOVENOX) injection 40 mg  40 mg SubCUTAneous DAILY    L.acidophilus-paracasei-S.thermophil-bifidobacter (RISAQUAD) 8 billion cell capsule  1 Cap Oral DAILY    insulin lispro (HUMALOG) injection   SubCUTAneous AC&HS    glucose chewable tablet 16 g  4 Tab Oral PRN    dextrose (D50W) injection syrg 12.5-25 g  12.5-25 g IntraVENous PRN    glucagon (GLUCAGEN) injection 1 mg  1 mg IntraMUSCular PRN    sodium chloride (NS) flush 5-10 mL  5-10 mL IntraVENous PRN    Vancomycin Pharmacy Dosing   Other Rx Dosing/Monitoring    insulin glargine (LANTUS) injection 30 Units  30 Units SubCUTAneous QHS    traMADoL (ULTRAM) tablet 50 mg  50 mg Oral Q6H PRN    meropenem (MERREM) 500 mg in 0.9% sodium chloride (MBP/ADV) 50 mL MBP  500 mg IntraVENous Q6H    ibuprofen (MOTRIN) tablet 600 mg  600 mg Oral Q6H PRN ______________________________________________________________________  EXPECTED LENGTH OF STAY: - - -  ACTUAL LENGTH OF STAY:          2                 Yaquelin Evans MD

## 2022-03-19 PROBLEM — A41.9 SEPSIS (HCC): Status: ACTIVE | Noted: 2021-03-19

## 2023-05-17 RX ORDER — LISINOPRIL 5 MG/1
5 TABLET ORAL DAILY
COMMUNITY
Start: 2019-07-31

## 2023-05-17 RX ORDER — CIPROFLOXACIN 500 MG/1
500 TABLET, FILM COATED ORAL 2 TIMES DAILY
COMMUNITY
Start: 2021-03-19

## 2024-02-01 ENCOUNTER — HOSPITAL ENCOUNTER (OUTPATIENT)
Facility: HOSPITAL | Age: 44
Setting detail: SPECIMEN
Discharge: HOME | End: 2024-02-04

## 2024-02-01 DIAGNOSIS — E11.8 DM (DIABETES MELLITUS), TYPE 2 WITH COMPLICATIONS (HCC): ICD-10-CM

## 2024-02-01 PROCEDURE — 80053 COMPREHEN METABOLIC PANEL: CPT

## 2024-02-01 PROCEDURE — 36415 COLL VENOUS BLD VENIPUNCTURE: CPT

## 2024-02-01 PROCEDURE — 83036 HEMOGLOBIN GLYCOSYLATED A1C: CPT

## 2024-02-01 PROCEDURE — 84443 ASSAY THYROID STIM HORMONE: CPT

## 2024-02-02 NOTE — PROGRESS NOTES
2024 : Zelalem BRAVO Josef Ferguson (: 1980) is a 44 y.o. female, established patient, here for evaluation of the following chief complaint(s):  Diabetes (1 week f/o for DM.)     ASSESSMENT/PLAN: PAP for glargine.  Lantus Solostar was increased today to 35 units sc qam.  Will order Basaglar for PAP.  Consider self-titration of insulin next time.    Below is the assessment and plan developed based on review of pertinent history, physical exam, labs, studies, and medications.   1. DM (diabetes mellitus), type 2 with complications (HCC)  -     AMB POC GLUCOSE BLOOD, BY GLUCOSE MONITORING DEVICE  -     insulin glargine (LANTUS SOLOSTAR) 100 UNIT/ML injection pen; Si units daily, Disp-1 Adjustable Dose Pre-filled Pen Syringe, R-3Normal  2. Diabetic neuropathy, painful (HCC)  -     amitriptyline (ELAVIL) 10 MG tablet; Take 1 tablet by mouth nightly For hands and feet, Disp-90 tablet, R-1Normal    Return for 2 wks LK diabetes consult; nutrition when available.    SUBJECTIVE/OBJECTIVE: having burning like fire in hands and feet at night.  HPI Had been on 70/30, took 20 units a few times a week when she felt bad.  Last clinic visit 3 years ago. Started on Lantus on 24, instructed on how to use.  Black coffee.    Measuring glucoses? Yes  30 units Lantus 9:30 am.  Ate at 12 pm.  Fastings: more than 300.  340 360.  This morning 338.  Appt for nutritionist  Water.    Egg, 2 pc wheat bread.    Sometimes 380 afterwards.  Results for orders placed or performed in visit on 24   AMB POC GLUCOSE BLOOD, BY GLUCOSE MONITORING DEVICE   Result Value Ref Range    Glucose,  MG/DL     Wt Readings from Last 3 Encounters:   24 71.7 kg (158 lb)   24 69.9 kg (154 lb)   21 74.4 kg (164 lb)       Lab Results   Component Value Date    LABA1C 13.3 (H) 2024    LABA1C 11.6 (H) 2021    MALBCR 30 2021    MALBCR 28 2020    CREATININE 0.84 2024    CREATININE

## 2024-02-03 LAB
ALBUMIN SERPL-MCNC: 3.6 G/DL (ref 3.5–5)
ALBUMIN/GLOB SERPL: 0.9 (ref 1.1–2.2)
ALP SERPL-CCNC: 101 U/L (ref 45–117)
ALT SERPL-CCNC: 22 U/L (ref 12–78)
ANION GAP SERPL CALC-SCNC: 7 MMOL/L (ref 5–15)
AST SERPL-CCNC: 14 U/L (ref 15–37)
BILIRUB SERPL-MCNC: 0.3 MG/DL (ref 0.2–1)
BUN SERPL-MCNC: 13 MG/DL (ref 6–20)
BUN/CREAT SERPL: 15 (ref 12–20)
CALCIUM SERPL-MCNC: 9 MG/DL (ref 8.5–10.1)
CHLORIDE SERPL-SCNC: 101 MMOL/L (ref 97–108)
CO2 SERPL-SCNC: 26 MMOL/L (ref 21–32)
CREAT SERPL-MCNC: 0.84 MG/DL (ref 0.55–1.02)
EST. AVERAGE GLUCOSE BLD GHB EST-MCNC: 335 MG/DL
GLOBULIN SER CALC-MCNC: 3.9 G/DL (ref 2–4)
GLUCOSE SERPL-MCNC: 415 MG/DL (ref 65–100)
HBA1C MFR BLD: 13.3 % (ref 4–5.6)
POTASSIUM SERPL-SCNC: 4.3 MMOL/L (ref 3.5–5.1)
PROT SERPL-MCNC: 7.5 G/DL (ref 6.4–8.2)
SODIUM SERPL-SCNC: 134 MMOL/L (ref 136–145)
TSH SERPL DL<=0.05 MIU/L-ACNC: 2.35 UIU/ML (ref 0.36–3.74)

## 2024-02-12 ENCOUNTER — OFFICE VISIT (OUTPATIENT)
Age: 44
End: 2024-02-12

## 2024-02-12 VITALS
HEART RATE: 101 BPM | WEIGHT: 158 LBS | DIASTOLIC BLOOD PRESSURE: 80 MMHG | TEMPERATURE: 98 F | OXYGEN SATURATION: 98 % | BODY MASS INDEX: 28.71 KG/M2 | SYSTOLIC BLOOD PRESSURE: 138 MMHG

## 2024-02-12 DIAGNOSIS — E11.8 DM (DIABETES MELLITUS), TYPE 2 WITH COMPLICATIONS (HCC): Primary | ICD-10-CM

## 2024-02-12 DIAGNOSIS — E11.40 DIABETIC NEUROPATHY, PAINFUL (HCC): ICD-10-CM

## 2024-02-12 LAB — GLUCOSE, POC: 283 MG/DL

## 2024-02-12 PROCEDURE — 82962 GLUCOSE BLOOD TEST: CPT | Performed by: NURSE PRACTITIONER

## 2024-02-12 PROCEDURE — 3046F HEMOGLOBIN A1C LEVEL >9.0%: CPT | Performed by: NURSE PRACTITIONER

## 2024-02-12 PROCEDURE — 99214 OFFICE O/P EST MOD 30 MIN: CPT | Performed by: NURSE PRACTITIONER

## 2024-02-12 RX ORDER — AMITRIPTYLINE HYDROCHLORIDE 10 MG/1
10 TABLET, FILM COATED ORAL NIGHTLY
Qty: 90 TABLET | Refills: 1 | Status: SHIPPED | OUTPATIENT
Start: 2024-02-12

## 2024-02-12 RX ORDER — INSULIN GLARGINE 100 [IU]/ML
INJECTION, SOLUTION SUBCUTANEOUS
Qty: 1 ADJUSTABLE DOSE PRE-FILLED PEN SYRINGE | Refills: 3 | Status: SHIPPED | OUTPATIENT
Start: 2024-02-12

## 2024-02-12 NOTE — PROGRESS NOTES
Care A Van:  Zelalem.  Saira Cruz LPN    Patient name and date of birth verified by .  Patient given an after visit summary, reviewed medications on how and when to take, coupons given to present to pharmacy for medication discount.  Advised to schedule next appointment before leaving clinic office. Advised :  Keep measuring fasting glucoses and sometimes 2 hours after eating   PAP application (glargine)   New dose of insulin starting tomorrow morning   Amitriptyline 10 mg qhs .   Patient verbalized understanding of all information given at time of visit. Saira Cruz LPN

## 2024-02-12 NOTE — PROGRESS NOTES
\"Have you been to the ER, urgent care clinic since your last visit?  Hospitalized since your last visit?\"    NO    “Have you seen or consulted any other health care providers outside of [facility] since your last visit?”    NO    “Have you had a colorectal cancer screening such as a colonoscopy/FIT/Cologuard?    NO      “Have you had a pap smear?”    YES - Where: 2017 [facility] Nurse/CMA to request most recent records if not in the chart      Results for orders placed or performed in visit on 02/12/24   AMB POC GLUCOSE BLOOD, BY GLUCOSE MONITORING DEVICE   Result Value Ref Range    Glucose,  MG/DL     Non fasting

## 2024-02-15 NOTE — PROGRESS NOTES
2024 : Simona BRAVO Josef Ferguson (: 1980) is a 44 y.o. female, established patient, here for evaluation of the following chief complaint(s):  Diabetes (Monitoring not fasting ), Flu Vaccine (Would like to get the flu vaccine today ), and Immunizations (Influenza)     ASSESSMENT/PLAN: Diabetes poorly controlled.  Increased Lantus to 45 units sc qam.  Below is the assessment and plan developed based on review of pertinent history, physical exam, labs, studies, and medications.   1. DM (diabetes mellitus), type 2 with complications (HCC)  -     AMB POC GLUCOSE BLOOD, BY GLUCOSE MONITORING DEVICE  -     LANTUS SOLOSTAR 100 UNIT/ML injection pen; Si units sc qam. Please dispense name brand at price point $35 per box, Disp-5 Adjustable Dose Pre-filled Pen Syringe, R-3, DAWNormal  -     Microalbumin / Creatinine Urine Ratio; Future  -     Hemoglobin A1C; Future  35 units sc in the morning.  320, 315 fasting.    Return for 2 months nonfasting labs, 2.5 months LK diabetes.    SUBJECTIVE/OBJECTIVE:  HPI Had been on 70/30, took 20 units a few times a week when she felt bad.  Last clinic visit 3 years ago. Started on Lantus on 24, instructed on how to use.  Results for orders placed or performed in visit on 24   AMB POC GLUCOSE BLOOD, BY GLUCOSE MONITORING DEVICE   Result Value Ref Range    Glucose,  MG/DL   Nonfasting    Lab Results   Component Value Date    LABA1C 13.3 (H) 2024    LABA1C 11.6 (H) 2021    MALBCR 30 2021    MALBCR 28 2020    CREATININE 0.84 2024    CREATININE 0.56 2021    LABGLOM >60 2024     Lab Results   Component Value Date    CHOL 144 2021    TRIG 157 (H) 2021    HDL 37 2021    LDLCALC 75.6 2021    CHOLHDLRATIO 3.9 2021       Review of Systems:  Constitutional:  Negative for fever and unexpected weight change.   Endocrine:  no polyuria or polydipsia, no chest pain, dyspnea or TIA's, no

## 2024-02-27 ENCOUNTER — OFFICE VISIT (OUTPATIENT)
Age: 44
End: 2024-02-27

## 2024-02-27 VITALS
HEIGHT: 64 IN | SYSTOLIC BLOOD PRESSURE: 135 MMHG | WEIGHT: 161.2 LBS | OXYGEN SATURATION: 99 % | DIASTOLIC BLOOD PRESSURE: 82 MMHG | TEMPERATURE: 98.4 F | BODY MASS INDEX: 27.52 KG/M2 | HEART RATE: 105 BPM

## 2024-02-27 DIAGNOSIS — E11.8 DM (DIABETES MELLITUS), TYPE 2 WITH COMPLICATIONS (HCC): Primary | ICD-10-CM

## 2024-02-27 LAB — GLUCOSE, POC: 260 MG/DL

## 2024-02-27 PROCEDURE — 99214 OFFICE O/P EST MOD 30 MIN: CPT | Performed by: NURSE PRACTITIONER

## 2024-02-27 PROCEDURE — 3046F HEMOGLOBIN A1C LEVEL >9.0%: CPT | Performed by: NURSE PRACTITIONER

## 2024-02-27 PROCEDURE — 82962 GLUCOSE BLOOD TEST: CPT | Performed by: NURSE PRACTITIONER

## 2024-02-27 RX ORDER — INSULIN GLARGINE 100 [IU]/ML
INJECTION, SOLUTION SUBCUTANEOUS
Qty: 5 ADJUSTABLE DOSE PRE-FILLED PEN SYRINGE | Refills: 3 | Status: SHIPPED | OUTPATIENT
Start: 2024-02-27

## 2024-02-27 SDOH — HEALTH STABILITY: MENTAL HEALTH: HOW OFTEN DO YOU HAVE A DRINK CONTAINING ALCOHOL?: NEVER

## 2024-02-27 SDOH — HEALTH STABILITY: MENTAL HEALTH: HOW MANY STANDARD DRINKS CONTAINING ALCOHOL DO YOU HAVE ON A TYPICAL DAY?: PATIENT DOES NOT DRINK

## 2024-02-27 SDOH — ECONOMIC STABILITY: INCOME INSECURITY: HOW HARD IS IT FOR YOU TO PAY FOR THE VERY BASICS LIKE FOOD, HOUSING, MEDICAL CARE, AND HEATING?: NOT HARD AT ALL

## 2024-02-27 SDOH — ECONOMIC STABILITY: HOUSING INSECURITY: IN THE LAST 12 MONTHS, HOW MANY PLACES HAVE YOU LIVED?: 1

## 2024-02-27 SDOH — ECONOMIC STABILITY: HOUSING INSECURITY
IN THE LAST 12 MONTHS, WAS THERE A TIME WHEN YOU DID NOT HAVE A STEADY PLACE TO SLEEP OR SLEPT IN A SHELTER (INCLUDING NOW)?: NO

## 2024-02-27 SDOH — ECONOMIC STABILITY: INCOME INSECURITY: IN THE LAST 12 MONTHS, WAS THERE A TIME WHEN YOU WERE NOT ABLE TO PAY THE MORTGAGE OR RENT ON TIME?: NO

## 2024-02-27 SDOH — SOCIAL STABILITY: SOCIAL INSECURITY
WITHIN THE LAST YEAR, HAVE TO BEEN RAPED OR FORCED TO HAVE ANY KIND OF SEXUAL ACTIVITY BY YOUR PARTNER OR EX-PARTNER?: NO

## 2024-02-27 SDOH — ECONOMIC STABILITY: FOOD INSECURITY: WITHIN THE PAST 12 MONTHS, YOU WORRIED THAT YOUR FOOD WOULD RUN OUT BEFORE YOU GOT MONEY TO BUY MORE.: NEVER TRUE

## 2024-02-27 SDOH — SOCIAL STABILITY: SOCIAL INSECURITY: WITHIN THE LAST YEAR, HAVE YOU BEEN HUMILIATED OR EMOTIONALLY ABUSED IN OTHER WAYS BY YOUR PARTNER OR EX-PARTNER?: NO

## 2024-02-27 SDOH — SOCIAL STABILITY: SOCIAL INSECURITY: WITHIN THE LAST YEAR, HAVE YOU BEEN AFRAID OF YOUR PARTNER OR EX-PARTNER?: NO

## 2024-02-27 SDOH — SOCIAL STABILITY: SOCIAL INSECURITY
WITHIN THE LAST YEAR, HAVE YOU BEEN KICKED, HIT, SLAPPED, OR OTHERWISE PHYSICALLY HURT BY YOUR PARTNER OR EX-PARTNER?: NO

## 2024-02-27 SDOH — ECONOMIC STABILITY: FOOD INSECURITY: WITHIN THE PAST 12 MONTHS, THE FOOD YOU BOUGHT JUST DIDN'T LAST AND YOU DIDN'T HAVE MONEY TO GET MORE.: NEVER TRUE

## 2024-02-27 ASSESSMENT — PATIENT HEALTH QUESTIONNAIRE - PHQ9
SUM OF ALL RESPONSES TO PHQ QUESTIONS 1-9: 0
SUM OF ALL RESPONSES TO PHQ QUESTIONS 1-9: 0
SUM OF ALL RESPONSES TO PHQ9 QUESTIONS 1 & 2: 0
1. LITTLE INTEREST OR PLEASURE IN DOING THINGS: 0
2. FEELING DOWN, DEPRESSED OR HOPELESS: 0
SUM OF ALL RESPONSES TO PHQ QUESTIONS 1-9: 0
SUM OF ALL RESPONSES TO PHQ QUESTIONS 1-9: 0

## 2024-02-27 NOTE — PROGRESS NOTES
Due to language barrier, an  (Martín WERNER [id number]) was present during the history-taking and subsequent discussion with this patient.   \"Have you been to the ER, urgent care clinic since your last visit?  Hospitalized since your last visit?\"    NO    “Have you seen or consulted any other health care providers outside of [facility] since your last visit?”    NO    “Have you had a colorectal cancer screening such as a colonoscopy/FIT/Cologuard?    NO      “Have you had a pap smear?”    NO       Results for orders placed or performed in visit on 02/27/24   AMB POC GLUCOSE BLOOD, BY GLUCOSE MONITORING DEVICE   Result Value Ref Range    Glucose,  MG/DL    Not fasting

## 2024-02-27 NOTE — PROGRESS NOTES
[facility] services:  [id number].  Saira Cruz LPN    Patient name and date of birth verified by .  Patient given an after visit summary, reviewed medication on how and when to take, coupon given to present to pharmacy for medication discount.  Advised to schedule next appointments before leaving clinic office.  Patient verbalized understanding of all information given at time of visit. Saira Cruz LPN    Patient given list of [facility] doctors to reach out to for assistance for immigration letter.  Also, given name of doctor Dr. Sulaiman Ayers ( civil surgeon ) for immigration physicals. Saira Cruz LPN    Pharmacy assistance program application mailed to patient.  Saira Cruz LPN      Patient given an VIS for influenza and vaccine consent form information reviewed of the common side effects/reactions ( redness and soreness ) at injection site.  Emergent care side effects/reactions are full body rash, difficulty breathing and full body weakness.  Advised to remain in the clinic office for 15 minutes for observation of any side effects/reactions.  Patient verbalized understanding of all information given.  No known reactions noted.  Saira Cruz LPN

## 2024-04-22 NOTE — PROGRESS NOTES
2024 : Simona BRAVO Josef Ferguson (: 1980) is a 44 y.o. female, established patient, here for evaluation of the following chief complaint(s):  Diabetes (Diabetes fu/)     ASSESSMENT/PLAN: plan on ordering glargine and januvia from [facility]. Increased lantus today. Eyes next time.  Labs ordered today, in bold, are for next time.    Below is the assessment and plan developed based on review of pertinent history, physical exam, labs, studies, and medications.   1. DM (diabetes mellitus), type 2 with complications (HCC)  -     AMB POC GLUCOSE BLOOD, BY GLUCOSE MONITORING DEVICE  -     LANTUS SOLOSTAR 100 UNIT/ML injection pen; Si units sc qam and 30 units sc in the evening. Please dispense name brand at price point $35 per box.  Dispense QS for 75 units per day., Disp-5 Adjustable Dose Pre-filled Pen Syringe, R-3, DAWNormal  -     metFORMIN (GLUCOPHAGE) 500 MG tablet; Take 1 tablet by mouth 2 times daily (with meals), Disp-180 tablet, R-3Normal  -     lisinopril (PRINIVIL;ZESTRIL) 5 MG tablet; Take 1 tablet by mouth daily, Disp-90 tablet, R-3Normal  -     Insulin Pen Needle (PEN NEEDLES) 32G X 5 MM MISC; To be used with solostar pen, Disp-50 each, R-1Normal  -     Lipid Panel; Future  -     Hemoglobin A1C; Future  2. Diabetic neuropathy, painful (HCC)  -     amitriptyline (ELAVIL) 10 MG tablet; Take 1 tablet by mouth nightly For hands and feet, Disp-90 tablet, R-1Normal    Return for 8 weeks fasting labs; 10 weeks LK diabetes.  Having blurry vision.  SUBJECTIVE/OBJECTIVE: Started on Lantus on 24, instructed on how to use.  On  A1c 13.3,  it was 10.6.  HPI doesn't check fasting.  Checks an hour after eating, 180.  215.  Had severe pain LLQ last night which is only mild right now, history of myomas.  In April did not have her period, this is the first time this has happened.  Has had tubal ligation.  The ASCVD Risk score (Jason DK, et al., 2019) failed to calculate for the following

## 2024-05-01 ENCOUNTER — HOSPITAL ENCOUNTER (OUTPATIENT)
Facility: HOSPITAL | Age: 44
Setting detail: SPECIMEN
Discharge: HOME | End: 2024-05-04

## 2024-05-01 ENCOUNTER — NURSE ONLY (OUTPATIENT)
Age: 44
End: 2024-05-01

## 2024-05-01 DIAGNOSIS — E11.8 DM (DIABETES MELLITUS), TYPE 2 WITH COMPLICATIONS (HCC): ICD-10-CM

## 2024-05-01 LAB
CREAT UR-MCNC: 28.3 MG/DL
EST. AVERAGE GLUCOSE BLD GHB EST-MCNC: 258 MG/DL
HBA1C MFR BLD: 10.6 % (ref 4–5.6)
MICROALBUMIN UR-MCNC: 0.81 MG/DL
MICROALBUMIN/CREAT UR-RTO: 29 MG/G (ref 0–30)

## 2024-05-01 PROCEDURE — 36415 COLL VENOUS BLD VENIPUNCTURE: CPT

## 2024-05-01 PROCEDURE — 83036 HEMOGLOBIN GLYCOSYLATED A1C: CPT

## 2024-05-01 PROCEDURE — 82043 UR ALBUMIN QUANTITATIVE: CPT

## 2024-05-01 PROCEDURE — 82570 ASSAY OF URINE CREATININE: CPT

## 2024-05-03 NOTE — RESULT ENCOUNTER NOTE
Your A1c has improved and is now 10.6.  Goal is 8.  No urine protein (kidneys are normal).  Please bring all your medicines to your follow up.  Thank you, Adelina

## 2024-05-07 ENCOUNTER — TELEPHONE (OUTPATIENT)
Age: 44
End: 2024-05-07

## 2024-05-20 ENCOUNTER — OFFICE VISIT (OUTPATIENT)
Age: 44
End: 2024-05-20

## 2024-05-20 VITALS
DIASTOLIC BLOOD PRESSURE: 76 MMHG | WEIGHT: 169 LBS | SYSTOLIC BLOOD PRESSURE: 114 MMHG | TEMPERATURE: 98.2 F | BODY MASS INDEX: 29.17 KG/M2 | HEART RATE: 99 BPM | OXYGEN SATURATION: 98 %

## 2024-05-20 DIAGNOSIS — E11.40 DIABETIC NEUROPATHY, PAINFUL (HCC): ICD-10-CM

## 2024-05-20 DIAGNOSIS — E11.8 DM (DIABETES MELLITUS), TYPE 2 WITH COMPLICATIONS (HCC): Primary | ICD-10-CM

## 2024-05-20 LAB — GLUCOSE, POC: 277 MG/DL

## 2024-05-20 PROCEDURE — 82962 GLUCOSE BLOOD TEST: CPT | Performed by: NURSE PRACTITIONER

## 2024-05-20 PROCEDURE — 99214 OFFICE O/P EST MOD 30 MIN: CPT | Performed by: NURSE PRACTITIONER

## 2024-05-20 PROCEDURE — 3046F HEMOGLOBIN A1C LEVEL >9.0%: CPT | Performed by: NURSE PRACTITIONER

## 2024-05-20 RX ORDER — AMITRIPTYLINE HYDROCHLORIDE 10 MG/1
10 TABLET, FILM COATED ORAL NIGHTLY
Qty: 90 TABLET | Refills: 1 | Status: SHIPPED | OUTPATIENT
Start: 2024-05-20

## 2024-05-20 RX ORDER — INSULIN GLARGINE 100 [IU]/ML
INJECTION, SOLUTION SUBCUTANEOUS
Qty: 5 ADJUSTABLE DOSE PRE-FILLED PEN SYRINGE | Refills: 3 | Status: SHIPPED | OUTPATIENT
Start: 2024-05-20

## 2024-05-20 RX ORDER — LISINOPRIL 5 MG/1
5 TABLET ORAL DAILY
Qty: 90 TABLET | Refills: 3 | Status: SHIPPED | OUTPATIENT
Start: 2024-05-20

## 2024-05-20 RX ORDER — BLOOD SUGAR DIAGNOSTIC
STRIP MISCELLANEOUS
Qty: 50 EACH | Refills: 1 | Status: SHIPPED | OUTPATIENT
Start: 2024-05-20

## 2024-05-20 SDOH — HEALTH STABILITY: MENTAL HEALTH
STRESS IS WHEN SOMEONE FEELS TENSE, NERVOUS, ANXIOUS, OR CAN'T SLEEP AT NIGHT BECAUSE THEIR MIND IS TROUBLED. HOW STRESSED ARE YOU?: NOT AT ALL

## 2024-05-20 SDOH — SOCIAL STABILITY: SOCIAL NETWORK: HOW OFTEN DO YOU GET TOGETHER WITH FRIENDS OR RELATIVES?: TWICE A WEEK

## 2024-05-20 SDOH — HEALTH STABILITY: MENTAL HEALTH: HOW MANY STANDARD DRINKS CONTAINING ALCOHOL DO YOU HAVE ON A TYPICAL DAY?: PATIENT DOES NOT DRINK

## 2024-05-20 SDOH — HEALTH STABILITY: MENTAL HEALTH: HOW OFTEN DO YOU HAVE A DRINK CONTAINING ALCOHOL?: NEVER

## 2024-05-20 SDOH — SOCIAL STABILITY: SOCIAL NETWORK
IN A TYPICAL WEEK, HOW MANY TIMES DO YOU TALK ON THE PHONE WITH FAMILY, FRIENDS, OR NEIGHBORS?: MORE THAN THREE TIMES A WEEK

## 2024-05-20 SDOH — SOCIAL STABILITY: SOCIAL NETWORK: ARE YOU MARRIED, WIDOWED, DIVORCED, SEPARATED, NEVER MARRIED, OR LIVING WITH A PARTNER?: MARRIED

## 2024-05-20 SDOH — SOCIAL STABILITY: SOCIAL NETWORK: HOW OFTEN DO YOU ATTENT MEETINGS OF THE CLUB OR ORGANIZATION YOU BELONG TO?: NEVER

## 2024-05-20 SDOH — HEALTH STABILITY: PHYSICAL HEALTH: ON AVERAGE, HOW MANY DAYS PER WEEK DO YOU ENGAGE IN MODERATE TO STRENUOUS EXERCISE (LIKE A BRISK WALK)?: 0 DAYS

## 2024-05-20 SDOH — SOCIAL STABILITY: SOCIAL NETWORK
DO YOU BELONG TO ANY CLUBS OR ORGANIZATIONS SUCH AS CHURCH GROUPS UNIONS, FRATERNAL OR ATHLETIC GROUPS, OR SCHOOL GROUPS?: NO

## 2024-05-20 SDOH — SOCIAL STABILITY: SOCIAL NETWORK: HOW OFTEN DO YOU ATTEND CHURCH OR RELIGIOUS SERVICES?: NEVER

## 2024-05-20 SDOH — HEALTH STABILITY: PHYSICAL HEALTH: ON AVERAGE, HOW MANY MINUTES DO YOU ENGAGE IN EXERCISE AT THIS LEVEL?: 0 MIN

## 2024-05-20 ASSESSMENT — PATIENT HEALTH QUESTIONNAIRE - PHQ9
SUM OF ALL RESPONSES TO PHQ QUESTIONS 1-9: 0
1. LITTLE INTEREST OR PLEASURE IN DOING THINGS: NOT AT ALL
SUM OF ALL RESPONSES TO PHQ9 QUESTIONS 1 & 2: 0
2. FEELING DOWN, DEPRESSED OR HOPELESS: NOT AT ALL
SUM OF ALL RESPONSES TO PHQ QUESTIONS 1-9: 0

## 2024-05-20 NOTE — PROGRESS NOTES
Chief Complaint   Patient presents with    Diabetes     Diabetes fu        [facility] services:  [id number].   Saira Cruz LPN    Patient name and date of birth verified by .  Patient given an after visit summary, reviewed medications on how and when to take, coupons given to present to pharmacy for medication discount.  Advised to schedule next appointments before leaving clinic office.  Patient verbalized understanding of all information given at time of visit. Saira Cruz LPN    Patient was given an application for the pharmacy assistance program.  Advised that once completed to return to [facility].  Patient verbalized understanding of all information given at time of visit.  Saira Cruz LPN

## 2024-05-20 NOTE — PROGRESS NOTES
\"Have you been to the ER, urgent care clinic since your last visit?  Hospitalized since your last visit?\"    NO    “Have you seen or consulted any other health care providers outside of [facility] since your last visit?”    NO     “Have you had a pap smear?”    NO    No cervical cancer screening on file         “Have you had a colorectal cancer screening such as a colonoscopy/FIT/Cologuard?    NO    No colonoscopy on file  No cologuard on file  No FIT/FOBT on file   No flexible sigmoidoscopy on file     Results for orders placed or performed in visit on 05/20/24   AMB POC GLUCOSE BLOOD, BY GLUCOSE MONITORING DEVICE   Result Value Ref Range    Glucose,  MG/DL      Click Here for Release of Records Request

## 2024-07-22 ENCOUNTER — HOSPITAL ENCOUNTER (OUTPATIENT)
Facility: HOSPITAL | Age: 44
Setting detail: SPECIMEN
Discharge: HOME | End: 2024-07-25

## 2024-07-22 ENCOUNTER — LAB (OUTPATIENT)
Age: 44
End: 2024-07-22

## 2024-07-22 DIAGNOSIS — E11.8 DM (DIABETES MELLITUS), TYPE 2 WITH COMPLICATIONS (HCC): ICD-10-CM

## 2024-07-22 PROCEDURE — 83036 HEMOGLOBIN GLYCOSYLATED A1C: CPT

## 2024-07-22 PROCEDURE — 80061 LIPID PANEL: CPT

## 2024-07-22 PROCEDURE — 36415 COLL VENOUS BLD VENIPUNCTURE: CPT

## 2024-07-23 LAB
CHOLEST SERPL-MCNC: 147 MG/DL
EST. AVERAGE GLUCOSE BLD GHB EST-MCNC: 258 MG/DL
HBA1C MFR BLD: 10.6 % (ref 4–5.6)
HDLC SERPL-MCNC: 49 MG/DL
HDLC SERPL: 3 (ref 0–5)
LDLC SERPL CALC-MCNC: 77 MG/DL (ref 0–100)
TRIGL SERPL-MCNC: 105 MG/DL
VLDLC SERPL CALC-MCNC: 21 MG/DL

## 2024-07-26 ENCOUNTER — OFFICE VISIT (OUTPATIENT)
Age: 44
End: 2024-07-26

## 2024-07-26 VITALS
OXYGEN SATURATION: 100 % | TEMPERATURE: 96.9 F | BODY MASS INDEX: 29.93 KG/M2 | SYSTOLIC BLOOD PRESSURE: 155 MMHG | WEIGHT: 173.4 LBS | DIASTOLIC BLOOD PRESSURE: 76 MMHG | HEART RATE: 110 BPM

## 2024-07-26 DIAGNOSIS — E11.8 DM (DIABETES MELLITUS), TYPE 2 WITH COMPLICATIONS (HCC): Primary | ICD-10-CM

## 2024-07-26 DIAGNOSIS — L97.511 ULCER OF RIGHT FOOT, LIMITED TO BREAKDOWN OF SKIN (HCC): ICD-10-CM

## 2024-07-26 LAB — GLUCOSE, POC: 283 MG/DL

## 2024-07-26 PROCEDURE — 82962 GLUCOSE BLOOD TEST: CPT | Performed by: NURSE PRACTITIONER

## 2024-07-26 PROCEDURE — 3046F HEMOGLOBIN A1C LEVEL >9.0%: CPT | Performed by: NURSE PRACTITIONER

## 2024-07-26 PROCEDURE — 99214 OFFICE O/P EST MOD 30 MIN: CPT | Performed by: NURSE PRACTITIONER

## 2024-07-26 RX ORDER — ZINC OXIDE 22 G/100G
CREAM TOPICAL
Qty: 113 G | Refills: 1 | Status: SHIPPED | OUTPATIENT
Start: 2024-07-26

## 2024-07-26 RX ORDER — LISINOPRIL 5 MG/1
10 TABLET ORAL DAILY
Qty: 90 TABLET | Refills: 3 | Status: SHIPPED | OUTPATIENT
Start: 2024-07-26

## 2024-07-26 RX ORDER — AMOXICILLIN AND CLAVULANATE POTASSIUM 875; 125 MG/1; MG/1
1 TABLET, FILM COATED ORAL 2 TIMES DAILY
Qty: 20 TABLET | Refills: 0 | Status: SHIPPED | OUTPATIENT
Start: 2024-07-26 | End: 2024-08-05

## 2024-07-26 RX ORDER — GLIMEPIRIDE 1 MG/1
1 TABLET ORAL
Qty: 90 TABLET | Refills: 0 | Status: SHIPPED | OUTPATIENT
Start: 2024-07-26

## 2024-07-26 NOTE — PROGRESS NOTES
Pt's name and  verified. AVS provided. Medication reviewed and education provided. Good rx coupons provided and instructed on use. Pt instructed to schedule follow up in 1 month per provider. Pt verbalizes understanding. Diabetic foot ulcer information provided and reviewed. Time allowed for questions, all questions answered.  Zelalem assisted.  Mercedez Johnson RN

## 2024-07-26 NOTE — PROGRESS NOTES
2024 : Zelalem BRAVO Josef Ferguson (: 1980) is a 44 y.o. female, established patient, here for evaluation of the following chief complaint(s):  Diabetes (Follow up )     ASSESSMENT/PLAN: Blood pressure not controlled.  Increase lisinopril to 10 mg.  Diabetes not controlled.  Added glimepiride 1 mg.  Wt Readings from Last 3 Encounters:   24 78.7 kg (173 lb 6.4 oz)   24 76.7 kg (169 lb)   24 73.1 kg (161 lb 3.2 oz)       Below is the assessment and plan developed based on review of pertinent history, physical exam, labs, studies, and medications.   1. DM (diabetes mellitus), type 2 with complications (HCC)  -     AMB POC GLUCOSE BLOOD, BY GLUCOSE MONITORING DEVICE  -     lisinopril (PRINIVIL;ZESTRIL) 5 MG tablet; Take 2 tablets by mouth daily NOT SENT TO PHARMACY, to update record., Disp-90 tablet, R-3Normal  -     glimepiride (AMARYL) 1 MG tablet; Take 1 tablet by mouth every morning (before breakfast), Disp-90 tablet, R-0Normal  2. Ulcer of right foot, limited to breakdown of skin (HCC)  -     amoxicillin-clavulanate (AUGMENTIN) 875-125 MG per tablet; Take 1 tablet by mouth 2 times daily for 10 days, Disp-20 tablet, R-0Normal  -     Zinc Oxide 22 % CREA; Apply thin layer to damaged skin every day., Disp-113 g, R-1Normal  We increased lisinopril.  Had headache for 1 week, right side.    Bought size 8.5, normally wears size 8, work boots.  Not working now.  2 wks ago.  Return for 1 month LK dm, feet.  SUBJECTIVE/OBJECTIVE:  HPI  Review of Systems Negative for fever and unexpected weight change, shortness of breath, leg swelling, abdominal pain, myalgias, and dizziness.   Endocrine:  no polyuria or polydipsia, no chest pain, dyspnea or TIA's, no numbness, tingling or pain in extremities, no unusual visual symptoms, no hypoglycemia, no medication side effects noted.  Lab Results   Component Value Date    LABA1C 10.6 (H) 2024    [facility]    CREATININE 0.84

## 2024-07-26 NOTE — PROGRESS NOTES
Due to a language barrier, an  was used to complete the intake of the patient. Simona Ordonez was the  for this visit.     Non-fasting glucose  Results for orders placed or performed in visit on 07/26/24   AMB POC GLUCOSE BLOOD, BY GLUCOSE MONITORING DEVICE   Result Value Ref Range    Glucose,  MG/DL           \"Have you been to the ER, urgent care clinic since your last visit?  Hospitalized since your last visit?\"    NO    “Have you seen or consulted any other health care providers outside of [facility] since your last visit?”    NO    Have you had a mammogram?”   NO    No breast cancer screening on file      “Have you had a pap smear?”    NO    No cervical cancer screening on file         “Have you had a colorectal cancer screening such as a colonoscopy/FIT/Cologuard?    NO    No colonoscopy on file  No cologuard on file  No FIT/FOBT on file   No flexible sigmoidoscopy on file         Click Here for Release of Records Request

## 2024-08-16 NOTE — PROGRESS NOTES
2024 : Zelalem BRAVO Josef Ferguson (: 1980) is a 44 y.o. female, established patient, here for evaluation of the following chief complaint(s):  Diabetes (1 month f/u) and Foot Pain (Right foot pain f/u)     ASSESSMENT/PLAN: we stopped glimepiride.  Reminded of correct dosing of lantus.  Added lispro bid with meals.  Below is the assessment and plan developed based on review of pertinent history, physical exam, labs, studies, and medications.   1. DM (diabetes mellitus), type 2 with complications (HCC)  -     AMB POC GLUCOSE BLOOD, BY GLUCOSE MONITORING DEVICE  -     insulin lispro, 1 Unit Dial, (HUMALOG KWIKPEN) 100 UNIT/ML SOPN; 8 units sc qam and 8 units sc with dinner.  Please honor soledad, Disp-5 Adjustable Dose Pre-filled Pen Syringe, R-3Normal  2. Toenail fungus  -     terbinafine (LAMISIL) 250 MG tablet; Take 1 tablet by mouth daily, Disp-84 tablet, R-0Normal  Ate 11 am, 2 corn tortillas with cheese, water.  Insulin, metformin, glimepiride  Return for 4 weeks LK diabetes, new insulin/doses.  SUBJECTIVE/OBJECTIVE:  HPI left foot doing well.  Wt Readings from Last 3 Encounters:   24 78 kg (172 lb)   24 78.7 kg (173 lb 6.4 oz)   24 76.7 kg (169 lb)       Review of Systems Negative for fever and unexpected weight change, shortness of breath, leg swelling, abdominal pain, myalgias, and dizziness.   Endocrine:  no polyuria or polydipsia, no chest pain, dyspnea or TIA's, no numbness, tingling or pain in extremities, no unusual visual symptoms, no hypoglycemia, no medication side effects noted.add humalog 8 units in am and 8 units in pm and stop glimepiride  Results for orders placed or performed in visit on 24   AMB POC GLUCOSE BLOOD, BY GLUCOSE MONITORING DEVICE   Result Value Ref Range    Glucose,  MG/DL   Not measuring sugars lately.  Lab Results   Component Value Date    LABA1C 10.6 (H) 2024    LABA1C 10.6 (H) 2024    LABA1C 13.3 (H)

## 2024-08-23 ENCOUNTER — OFFICE VISIT (OUTPATIENT)
Age: 44
End: 2024-08-23

## 2024-08-23 VITALS
BODY MASS INDEX: 29.69 KG/M2 | OXYGEN SATURATION: 98 % | DIASTOLIC BLOOD PRESSURE: 76 MMHG | HEART RATE: 101 BPM | WEIGHT: 172 LBS | TEMPERATURE: 97.7 F | SYSTOLIC BLOOD PRESSURE: 130 MMHG

## 2024-08-23 DIAGNOSIS — B35.1 TOENAIL FUNGUS: ICD-10-CM

## 2024-08-23 DIAGNOSIS — E11.8 DM (DIABETES MELLITUS), TYPE 2 WITH COMPLICATIONS (HCC): Primary | ICD-10-CM

## 2024-08-23 LAB — GLUCOSE, POC: 309 MG/DL

## 2024-08-23 PROCEDURE — 3046F HEMOGLOBIN A1C LEVEL >9.0%: CPT | Performed by: NURSE PRACTITIONER

## 2024-08-23 PROCEDURE — 82962 GLUCOSE BLOOD TEST: CPT | Performed by: NURSE PRACTITIONER

## 2024-08-23 PROCEDURE — 99213 OFFICE O/P EST LOW 20 MIN: CPT | Performed by: NURSE PRACTITIONER

## 2024-08-23 RX ORDER — TERBINAFINE HYDROCHLORIDE 250 MG/1
250 TABLET ORAL DAILY
Qty: 84 TABLET | Refills: 0 | Status: SHIPPED | OUTPATIENT
Start: 2024-08-23 | End: 2024-11-15

## 2024-08-23 RX ORDER — INSULIN LISPRO 100 [IU]/ML
INJECTION, SOLUTION INTRAVENOUS; SUBCUTANEOUS
Qty: 5 ADJUSTABLE DOSE PRE-FILLED PEN SYRINGE | Refills: 3 | Status: SHIPPED | OUTPATIENT
Start: 2024-08-23 | End: 2024-08-23

## 2024-08-23 RX ORDER — INSULIN LISPRO 100 [IU]/ML
INJECTION, SOLUTION INTRAVENOUS; SUBCUTANEOUS
Qty: 5 ADJUSTABLE DOSE PRE-FILLED PEN SYRINGE | Refills: 3 | Status: SHIPPED | OUTPATIENT
Start: 2024-08-23

## 2024-08-23 ASSESSMENT — PATIENT HEALTH QUESTIONNAIRE - PHQ9
SUM OF ALL RESPONSES TO PHQ QUESTIONS 1-9: 0
SUM OF ALL RESPONSES TO PHQ9 QUESTIONS 1 & 2: 0
2. FEELING DOWN, DEPRESSED OR HOPELESS: NOT AT ALL
SUM OF ALL RESPONSES TO PHQ QUESTIONS 1-9: 0
1. LITTLE INTEREST OR PLEASURE IN DOING THINGS: NOT AT ALL

## 2024-08-23 NOTE — PROGRESS NOTES
Discussed with . Patient needs to be seen as she is done for follow up. And she should call Opthalmology for an appointment as instructed at previus follow up.   Message sent to reception to schedule patient.    \"Have you been to the ER, urgent care clinic since your last visit?  Hospitalized since your last visit?\"    NO    “Have you seen or consulted any other health care providers outside of [facility] since your last visit?”    NO    Have you had a mammogram?”   NO       “Have you had a pap smear?”    NO        “Have you had a colorectal cancer screening such as a colonoscopy/FIT/Cologuard?    NO      Results for orders placed or performed in visit on 08/23/24   AMB POC GLUCOSE BLOOD, BY GLUCOSE MONITORING DEVICE   Result Value Ref Range    Glucose,  MG/DL     Non fasting

## 2024-08-23 NOTE — PROGRESS NOTES
Chief Complaint   Patient presents with    Diabetes     1 month f/u    Foot Pain     Right foot pain f/u      Care A Van:  Zelalem.  Saira Cruz LPN    Patient name and date of birth verified by .  Patient given an after visit summary, reviewed medications on how and when to take, coupons given to present to pharmacy for medication discount.  Advised to schedule next appointment before leaving clinic office.  Patient verbalized understanding of all information given at time of visit. Saira Cruz LPN

## 2024-10-11 DIAGNOSIS — E11.8 DM (DIABETES MELLITUS), TYPE 2 WITH COMPLICATIONS (HCC): Primary | ICD-10-CM

## 2024-10-11 NOTE — PROGRESS NOTES
Will return for nonfasting A1c.  May return 2 weeks after lab for diabetes consult 30 min or acute care slot LK.  Diagnoses and all orders for this visit:    DM (diabetes mellitus), type 2 with complications (HCC)  -     Hemoglobin A1C; Future      Adelina Gregg, SAMANTHA, FNP-BC, BC-ADM  Board Certified in Advanced Diabetes Management

## 2024-10-11 NOTE — PROGRESS NOTES
10/11/2024  Diagnoses and all orders for this visit:    DM (diabetes mellitus), type 2 with complications (HCC)  -     Hemoglobin A1C; Future      Adelina Gregg, SAMANTHA, FNP-BC, BC-ADM  Board Certified in Advanced Diabetes Management

## 2024-10-18 ENCOUNTER — LAB (OUTPATIENT)
Age: 44
End: 2024-10-18

## 2024-10-18 ENCOUNTER — HOSPITAL ENCOUNTER (OUTPATIENT)
Facility: HOSPITAL | Age: 44
Setting detail: SPECIMEN
Discharge: HOME | End: 2024-10-21

## 2024-10-18 DIAGNOSIS — E11.8 DM (DIABETES MELLITUS), TYPE 2 WITH COMPLICATIONS (HCC): ICD-10-CM

## 2024-10-18 DIAGNOSIS — Z13.9 ENCOUNTER FOR SCREENING: Primary | ICD-10-CM

## 2024-10-18 LAB
EST. AVERAGE GLUCOSE BLD GHB EST-MCNC: 237 MG/DL
HBA1C MFR BLD: 9.9 % (ref 4–5.6)

## 2024-10-18 PROCEDURE — 83036 HEMOGLOBIN GLYCOSYLATED A1C: CPT

## 2024-10-18 NOTE — PROGRESS NOTES
Patient presented to clinic for lab collection. Name and  verified. Labs obtained: HA1C. Patient tolerated procedure well. ADI TAM RN

## 2024-10-28 NOTE — PROGRESS NOTES
2024 : AMN Mitchell LAWRENCE Ferguson (: 1980) is a 44 y.o. female, established patient, here for evaluation of the following chief complaint(s):  Follow-up (Diabetes f/u ) and Cough (Pt c/o cough, per patient it started1 w ago. No fever. )  ASSESSMENT/PLAN: strep test negative.  Below is the assessment and plan developed based on review of pertinent history, physical exam, labs, studies, and medications.   1. DM (diabetes mellitus), type 2 with complications (HCC)  -     AMB POC GLUCOSE BLOOD, BY GLUCOSE MONITORING DEVICE  -     insulin lispro, 1 Unit Dial, (HUMALOG KWIKPEN) 100 UNIT/ML SOPN; 8 units sc qam and 8 units sc with dinner.  Please honor soledad, Disp-5 Adjustable Dose Pre-filled Pen Syringe, R-3Normal  -     fluconazole (DIFLUCAN) 150 MG tablet; Take 1 po every 72 hours for vaginal itching as needed., Disp-2 tablet, R-5Normal  -     metFORMIN (GLUCOPHAGE) 500 MG tablet; 2 po qam and 1 po with dinner, Disp-270 tablet, R-3Normal  -     Amb Referral to Nutrition Services  2. Sore throat  -     AMB POC RAPID STREP A  3. Other cough  -     benzonatate (TESSALON) 100 MG capsule; Take 1 capsule by mouth 3 times daily as needed for Cough, Disp-30 capsule, R-0Normal  -     loratadine (CLARITIN) 10 MG tablet; Take 1 tablet by mouth nightly For throat itching.  OVER THE COUNTER., Disp-30 tablet, R-0Normal  4. Diabetic neuropathy, painful (HCC)  -     amitriptyline (ELAVIL) 10 MG tablet; Take 1 tablet by mouth nightly For hands and feet, Disp-90 tablet, R-1Normal  Gets cramping pain in her abdomen.  Last pap was 3-4 years ago.  There is an itching in her vagina when she gets her period.  Return for dietitian appointment next available; lk lab appointment nonfasting 2 months, diabetes 30 min 2.5 mo.  SUBJECTIVE/OBJECTIVE:  HPI dry cough, worse at night.  Never before.  Throat itches.  No sneezing.  Has tried cough syrups, thinks they help.  Review of Systems Negative for fever and unexpected

## 2024-11-11 ENCOUNTER — OFFICE VISIT (OUTPATIENT)
Age: 44
End: 2024-11-11

## 2024-11-11 VITALS
BODY MASS INDEX: 30.38 KG/M2 | TEMPERATURE: 98.4 F | WEIGHT: 176 LBS | HEART RATE: 103 BPM | OXYGEN SATURATION: 99 % | DIASTOLIC BLOOD PRESSURE: 61 MMHG | SYSTOLIC BLOOD PRESSURE: 126 MMHG

## 2024-11-11 DIAGNOSIS — E11.40 DIABETIC NEUROPATHY, PAINFUL (HCC): ICD-10-CM

## 2024-11-11 DIAGNOSIS — J02.9 SORE THROAT: ICD-10-CM

## 2024-11-11 DIAGNOSIS — E11.8 DM (DIABETES MELLITUS), TYPE 2 WITH COMPLICATIONS (HCC): Primary | ICD-10-CM

## 2024-11-11 DIAGNOSIS — R05.8 OTHER COUGH: ICD-10-CM

## 2024-11-11 LAB
GLUCOSE, POC: 213 MG/DL
GROUP A STREP ANTIGEN, POC: NEGATIVE
VALID INTERNAL CONTROL, POC: YES

## 2024-11-11 PROCEDURE — 3046F HEMOGLOBIN A1C LEVEL >9.0%: CPT | Performed by: NURSE PRACTITIONER

## 2024-11-11 PROCEDURE — 82962 GLUCOSE BLOOD TEST: CPT | Performed by: NURSE PRACTITIONER

## 2024-11-11 PROCEDURE — 99213 OFFICE O/P EST LOW 20 MIN: CPT | Performed by: NURSE PRACTITIONER

## 2024-11-11 PROCEDURE — 87880 STREP A ASSAY W/OPTIC: CPT | Performed by: NURSE PRACTITIONER

## 2024-11-11 RX ORDER — INSULIN LISPRO 100 [IU]/ML
INJECTION, SOLUTION INTRAVENOUS; SUBCUTANEOUS
Qty: 5 ADJUSTABLE DOSE PRE-FILLED PEN SYRINGE | Refills: 3 | Status: SHIPPED | OUTPATIENT
Start: 2024-11-11

## 2024-11-11 RX ORDER — AMITRIPTYLINE HYDROCHLORIDE 10 MG/1
10 TABLET ORAL NIGHTLY
Qty: 90 TABLET | Refills: 1 | Status: SHIPPED | OUTPATIENT
Start: 2024-11-11

## 2024-11-11 RX ORDER — LORATADINE 10 MG/1
10 TABLET ORAL
Qty: 30 TABLET | Refills: 0 | Status: SHIPPED | OUTPATIENT
Start: 2024-11-11 | End: 2024-12-11

## 2024-11-11 RX ORDER — FLUCONAZOLE 150 MG/1
TABLET ORAL
Qty: 2 TABLET | Refills: 5 | Status: SHIPPED | OUTPATIENT
Start: 2024-11-11

## 2024-11-11 RX ORDER — BENZONATATE 100 MG/1
100 CAPSULE ORAL 3 TIMES DAILY PRN
Qty: 30 CAPSULE | Refills: 0 | Status: SHIPPED | OUTPATIENT
Start: 2024-11-11 | End: 2024-11-21

## 2024-11-11 ASSESSMENT — PATIENT HEALTH QUESTIONNAIRE - PHQ9
SUM OF ALL RESPONSES TO PHQ QUESTIONS 1-9: 0
1. LITTLE INTEREST OR PLEASURE IN DOING THINGS: NOT AT ALL
SUM OF ALL RESPONSES TO PHQ QUESTIONS 1-9: 0
SUM OF ALL RESPONSES TO PHQ QUESTIONS 1-9: 0
SUM OF ALL RESPONSES TO PHQ9 QUESTIONS 1 & 2: 0
2. FEELING DOWN, DEPRESSED OR HOPELESS: NOT AT ALL
SUM OF ALL RESPONSES TO PHQ QUESTIONS 1-9: 0

## 2024-11-11 NOTE — PROGRESS NOTES
\"Have you been to the ER, urgent care clinic since your last visit?  Hospitalized since your last visit?\"    NO    “Have you seen or consulted any other health care providers outside our system since your last visit?”    NO    Have you had a mammogram?”   NO    No breast cancer screening on file      “Have you had a pap smear?”    NO    No cervical cancer screening on file       “Have you had a colorectal cancer screening such as a colonoscopy/FIT/Cologuard?    NO    No colonoscopy on file  No cologuard on file  No FIT/FOBT on file   No flexible sigmoidoscopy on file     “Have you had a diabetic eye exam?”    NO     No diabetic eye exam on file            Results for orders placed or performed in visit on 11/11/24   AMB POC GLUCOSE BLOOD, BY GLUCOSE MONITORING DEVICE   Result Value Ref Range    Glucose,  MG/DL     None fasting

## 2024-11-11 NOTE — PROGRESS NOTES
Paula Ferguson seen at d/c, full name and  verified, given After visit Summary and reviewed today's visit with patient along with instructions on when it is recommended to come back (Return for dietitian appointment next available; lk lab appointment nonfasting 2 months, diabetes 30 min 2.5 mo. )  Provided patient with information pamphlet and phone # for [employer].  Explained that they will do a financial screening when they call before scheduling for an appointment. I explained to the patient that they will need to tell screener how many in the household are working and how much they are earning. she was instructed to call and set up an appointment at her earliest convenience and that the program has its own financial screening and requirements

## 2024-12-01 ENCOUNTER — HOSPITAL ENCOUNTER (EMERGENCY)
Facility: HOSPITAL | Age: 44
Discharge: HOME | End: 2024-12-01
Attending: STUDENT IN AN ORGANIZED HEALTH CARE EDUCATION/TRAINING PROGRAM

## 2024-12-01 VITALS
RESPIRATION RATE: 18 BRPM | DIASTOLIC BLOOD PRESSURE: 76 MMHG | TEMPERATURE: 97.7 F | WEIGHT: 173 LBS | HEART RATE: 100 BPM | HEIGHT: 63 IN | BODY MASS INDEX: 30.65 KG/M2 | SYSTOLIC BLOOD PRESSURE: 117 MMHG | OXYGEN SATURATION: 99 %

## 2024-12-01 DIAGNOSIS — G44.209 TENSION HEADACHE: ICD-10-CM

## 2024-12-01 DIAGNOSIS — J10.1 INFLUENZA A: Primary | ICD-10-CM

## 2024-12-01 PROCEDURE — 99282 EMERGENCY DEPT VISIT SF MDM: CPT

## 2024-12-01 ASSESSMENT — PAIN SCALES - GENERAL: PAINLEVEL_OUTOF10: 9

## 2024-12-01 ASSESSMENT — PAIN - FUNCTIONAL ASSESSMENT: PAIN_FUNCTIONAL_ASSESSMENT: 0-10

## 2024-12-01 NOTE — ED PROVIDER NOTES
Medication List as of 2024  6:09 PM        CONTINUE these medications which have NOT CHANGED    Details   insulin lispro, 1 Unit Dial, (HUMALOG KWIKPEN) 100 UNIT/ML SOPN 8 units sc qam and 8 units sc with dinner.  Please honor soledad, Disp-5 Adjustable Dose Pre-filled Pen Syringe, R-3Normal      fluconazole (DIFLUCAN) 150 MG tablet Take 1 po every 72 hours for vaginal itching as needed., Disp-2 tablet, R-5Normal      metFORMIN (GLUCOPHAGE) 500 MG tablet 2 po qam and 1 po with dinner, Disp-270 tablet, R-3Normal      loratadine (CLARITIN) 10 MG tablet Take 1 tablet by mouth nightly For throat itching.  OVER THE COUNTER., Disp-30 tablet, R-0Normal      amitriptyline (ELAVIL) 10 MG tablet Take 1 tablet by mouth nightly For hands and feet, Disp-90 tablet, R-1Normal      lisinopril (PRINIVIL;ZESTRIL) 5 MG tablet Take 2 tablets by mouth daily NOT SENT TO PHARMACY, to update record., Disp-90 tablet, R-3Normal      LANTUS SOLOSTAR 100 UNIT/ML injection pen Si units sc qam and 30 units sc in the evening. Please dispense name brand at price point $35 per box.  Dispense QS for 75 units per day., Disp-5 Adjustable Dose Pre-filled Pen Syringe, R-3, DAWNormal      Insulin Pen Needle (PEN NEEDLES) 32G X 5 MM MISC To be used with solostar pen, Disp-50 each, R-1Normal             ALLERGIES     Patient has no known allergies.    FAMILY HISTORY     No family history on file.       SOCIAL HISTORY       Social History     Socioeconomic History    Marital status:    Tobacco Use    Smoking status: Never     Passive exposure: Current    Smokeless tobacco: Never   Substance and Sexual Activity    Alcohol use: No    Drug use: No     Social Determinants of Health     Financial Resource Strain: Low Risk  (2024)    Overall Financial Resource Strain (CARDIA)     Difficulty of Paying Living Expenses: Not hard at all   Food Insecurity: No Food Insecurity (2024)    Hunger Vital Sign     Worried About Running Out of Food in

## 2024-12-01 NOTE — ED TRIAGE NOTES
Pt arrives to the ER for complaints of headache, bilateral eye pain and subjective fevers that started 4 days ago.     Denies any abdominal pain, nausea, vomiting or diarrhea.     Pt states that she was seen at [facility] on Thursday for the symptoms. Pt reports that she was tested for COVID and flu and tested positive for the Flu.     Reports that she has been taking promethazine, tylenol, and Oseltamivir Phosphate.

## 2024-12-02 ENCOUNTER — TELEPHONE (OUTPATIENT)
Age: 44
End: 2024-12-02

## 2024-12-02 NOTE — TELEPHONE ENCOUNTER
T/C made to the patient with assistance from [facility]  #/[id number], session code #38949, to follow-up after her 12-01-24 [facility] ED visit for c/o: HA, bilateral eye pain, subjective fevers x 4 days  Dx: Influenza A, tension HA    Per the ED notes, the patient reported going to a [facility] on 11-28-24 for similar symptoms and was diagnosed with the flu.    Follow-up with PCP recommended by the ED.    Per the patient, she is still feeling \"badly\" with intermittent headaches and bilateral ear and eye pain. She denied fever, nausea, vomiting, diarrhea, dizziness, SOB and stated that she is eating and drinking small amounts throughout the day. Patient reported an occasional dry, non-productive cough and blurry vision bilaterally after coughing. Per the patient. Her LMP began 11-14-24.    Pt stated she is taking Humalog, Lantus, Fluconazole, Metformin, Loratadine, Amitriptyline and Lisinopril as prescribed as well as Amoxicillin which she bought at a [employer] store. She stated that she does not know the exact dose of the Amoxicillin and usually takes 1 per day.    Patient stated that she does not have a BP machine at home and usually uses her glucometer every 3-4 days. She stated that she has not checked her blood sugar today and does not recall the last reading. Encouraged the patient to at least check a fasting blood sugar each morning and to keep a log of the readings to bring with her to her medical appointments.  Patient met with our RD, Danitza, on 11-18-24 and has a follow-up appointment on 12-23-24.    The patient is not enrolled in the [employer] referral program or in a patient assistance program for her insulins. She understands that [facility] workers are available to help her with the [employer] financial assistance application if needed once she receives a bill for the ED visit.    The patient agreed to an ED follow-up appointment on 12-06-24, 11:30am with SREEKANTH Douglas, at [facility]

## 2024-12-06 ENCOUNTER — OFFICE VISIT (OUTPATIENT)
Age: 44
End: 2024-12-06

## 2024-12-06 VITALS
TEMPERATURE: 98.1 F | HEART RATE: 105 BPM | SYSTOLIC BLOOD PRESSURE: 136 MMHG | DIASTOLIC BLOOD PRESSURE: 83 MMHG | BODY MASS INDEX: 30.84 KG/M2 | WEIGHT: 174.1 LBS | OXYGEN SATURATION: 98 %

## 2024-12-06 DIAGNOSIS — E11.8 DM (DIABETES MELLITUS), TYPE 2 WITH COMPLICATIONS (HCC): Primary | ICD-10-CM

## 2024-12-06 DIAGNOSIS — H92.02 LEFT EAR PAIN: ICD-10-CM

## 2024-12-06 LAB
BILIRUBIN, URINE, POC: NEGATIVE
BLOOD URINE, POC: NORMAL
GLUCOSE URINE, POC: 1000
GLUCOSE, POC: NORMAL MG/DL
KETONES, URINE, POC: NEGATIVE
LEUKOCYTE ESTERASE, URINE, POC: NORMAL
NITRITE, URINE, POC: NEGATIVE
PH, URINE, POC: 6 (ref 4.6–8)
PROTEIN,URINE, POC: NEGATIVE
SPECIFIC GRAVITY, URINE, POC: 1.01 (ref 1–1.03)
URINALYSIS CLARITY, POC: NORMAL
URINALYSIS COLOR, POC: YELLOW
UROBILINOGEN, POC: NORMAL MG/DL

## 2024-12-06 PROCEDURE — 3046F HEMOGLOBIN A1C LEVEL >9.0%: CPT | Performed by: NURSE PRACTITIONER

## 2024-12-06 PROCEDURE — 81002 URINALYSIS NONAUTO W/O SCOPE: CPT | Performed by: NURSE PRACTITIONER

## 2024-12-06 PROCEDURE — 82962 GLUCOSE BLOOD TEST: CPT | Performed by: NURSE PRACTITIONER

## 2024-12-06 PROCEDURE — 99213 OFFICE O/P EST LOW 20 MIN: CPT | Performed by: NURSE PRACTITIONER

## 2024-12-06 RX ORDER — OSELTAMIVIR PHOSPHATE 75 MG/1
75 CAPSULE ORAL 2 TIMES DAILY
COMMUNITY
Start: 2024-11-27

## 2024-12-06 RX ORDER — INSULIN GLARGINE 100 [IU]/ML
INJECTION, SOLUTION SUBCUTANEOUS
Qty: 5 ADJUSTABLE DOSE PRE-FILLED PEN SYRINGE | Refills: 3 | Status: SHIPPED | OUTPATIENT
Start: 2024-12-06

## 2024-12-06 RX ORDER — DEXTROMETHORPHAN HYDROBROMIDE AND PROMETHAZINE HYDROCHLORIDE 15; 6.25 MG/5ML; MG/5ML
SYRUP ORAL
COMMUNITY
Start: 2024-11-27

## 2024-12-06 RX ORDER — INSULIN LISPRO 100 [IU]/ML
INJECTION, SOLUTION INTRAVENOUS; SUBCUTANEOUS
Qty: 5 ADJUSTABLE DOSE PRE-FILLED PEN SYRINGE | Refills: 3 | Status: SHIPPED | OUTPATIENT
Start: 2024-12-06

## 2024-12-06 ASSESSMENT — PATIENT HEALTH QUESTIONNAIRE - PHQ9
SUM OF ALL RESPONSES TO PHQ QUESTIONS 1-9: 0
SUM OF ALL RESPONSES TO PHQ QUESTIONS 1-9: 0
1. LITTLE INTEREST OR PLEASURE IN DOING THINGS: NOT AT ALL
SUM OF ALL RESPONSES TO PHQ QUESTIONS 1-9: 0
2. FEELING DOWN, DEPRESSED OR HOPELESS: NOT AT ALL
SUM OF ALL RESPONSES TO PHQ QUESTIONS 1-9: 0
SUM OF ALL RESPONSES TO PHQ9 QUESTIONS 1 & 2: 0

## 2024-12-06 NOTE — PROGRESS NOTES
2024 : Pallavi BRAVO Josef Ferguson (: 1980) is a 44 y.o. female, established patient, here for evaluation of the following chief complaint(s):  Follow-up (Post Flu and ear infection- still having trouble with ears. ) and Diabetes  ASSESSMENT/PLAN: she is confident she can continue taking her medications as prescribed and not drinking juices or sodas.  Below is the assessment and plan developed based on review of pertinent history, physical exam, labs, studies, and medications.   1. DM (diabetes mellitus), type 2 with complications (HCC)  -     AMB POC GLUCOSE BLOOD, BY GLUCOSE MONITORING DEVICE  -     Hemoglobin A1C; Future  2. Hearing loss of left ear due to cerumen impaction    Return for nurse visit for ear lavage, left, 1 week; 2 months LK DM.  SUBJECTIVE/OBJECTIVE:  HPI had the flu recently, the past month, not right now.  Left ear.  Feels deaf on this ear.    Review of Systems Negative for fever and unexpected weight change, shortness of breath, leg swelling, abdominal pain, myalgias, dizziness. Negative for polyuria/polydipsia, chest pain, dyspnea, TIA's, numbness/tingling/pain in extremities, unusual visual symptoms, hypoglycemia symptoms, medication side effects.  Results for orders placed or performed in visit on 24   AMB POC GLUCOSE BLOOD, BY GLUCOSE MONITORING DEVICE   Result Value Ref Range    Glucose,  MG/DL   Using 45 units am and 40 units hs  Other insulin 10 in am and 10 pm  Metformin 2 po bid  No sodas or juice  Lab Results   Component Value Date    LABA1C 9.9 (H) 10/18/2024    LABA1C 10.6 (H) 2024    LABGLOM >60 2024    CREATININE 0.84 2024    ALT 22 2024    AST 14 (L) 2024    ALKPHOS 101 2024    BILITOT 0.3 2024    CHOL 147 2024    TRIG 105 2024    HDL 49 2024    LDL 77 2024    CHOLHDLRATIO 3.0 2024   The 10-year ASCVD risk score (Jason CONNOLLY, et al., 2019) is: 0.9%  Social History

## 2024-12-06 NOTE — PROGRESS NOTES
session code [id number]  Chief Complaint   Patient presents with    Influenza     ED follow up for flu dx, [facility] and was diagnosed with flu, but symptoms did not improve. Still experiencing ear pain and headache      Vitals:    12/06/24 1141   BP: 136/83   Site: Right Upper Arm   Position: Sitting   Pulse: (!) 105   Temp: 98.1 °F (36.7 °C)   TempSrc: Temporal   SpO2: 98%   Weight: 79 kg (174 lb 1.6 oz)     Results for orders placed or performed in visit on 12/06/24   AMB POC GLUCOSE BLOOD, BY GLUCOSE MONITORING DEVICE   Result Value Ref Range    Glucose, POC hhh nf MG/DL   AMB POC URINALYSIS DIP STICK MANUAL W/O MICRO   Result Value Ref Range    Color (UA POC) Yellow     Clarity (UA POC) Slightly Cloudy     Glucose, Urine, POC 1000     Bilirubin, Urine, POC Negative     Ketones, Urine, POC Negative     Specific Gravity, Urine, POC 1.010 1.001 - 1.035    Blood (UA POC) Trace     pH, Urine, POC 6.0 4.6 - 8.0    Protein, Urine, POC Negative     Urobilinogen, POC Normal <1.1 mg/dL    Nitrite, Urine, POC Negative Negative    Leukocyte Esterase, Urine, POC Trace          \"Have you been to the ER, urgent care clinic since your last visit?  Hospitalized since your last visit?\"    YES - When: approximately 5 days ago.  Where and Why: [facility] ED for flu symptoms w/o improvement.    “Have you seen or consulted any other health care providers outside our system since your last visit?”    YES - When: approximately 1  weeks ago.  [facility]    Have you had a mammogram?”   NO    No breast cancer screening on file      “Have you had a pap smear?”    NO    No cervical cancer screening on file       “Have you had a colorectal cancer screening such as a colonoscopy/FIT/Cologuard?    NO    No colonoscopy on file  No cologuard on file  No FIT/FOBT on file   No flexible sigmoidoscopy on file     “Have you had a diabetic eye exam?”    NO     No diabetic eye exam on file 
Patient discharged with the After Visit Summary. Patient's name and  verified. Patient made aware of the prescriptions sent to the pharmacy. Medication review completed. The patient was instructed on when to schedule the next appointment. Patient given an opportunity to voice questions/concerns. All questions addressed. [facility]  #[id number] assisted.   
signature was used to authenticate this note.  -- Adelina Gregg, APRN - NP

## 2024-12-23 ENCOUNTER — HOSPITAL ENCOUNTER (OUTPATIENT)
Facility: HOSPITAL | Age: 44
Setting detail: SPECIMEN
Discharge: HOME | End: 2024-12-26

## 2024-12-23 ENCOUNTER — OFFICE VISIT (OUTPATIENT)
Age: 44
End: 2024-12-23

## 2024-12-23 VITALS
OXYGEN SATURATION: 97 % | DIASTOLIC BLOOD PRESSURE: 68 MMHG | HEIGHT: 64 IN | SYSTOLIC BLOOD PRESSURE: 116 MMHG | HEART RATE: 103 BPM | TEMPERATURE: 97 F | BODY MASS INDEX: 30.01 KG/M2 | WEIGHT: 175.8 LBS

## 2024-12-23 DIAGNOSIS — E11.8 DM (DIABETES MELLITUS), TYPE 2 WITH COMPLICATIONS (HCC): Primary | ICD-10-CM

## 2024-12-23 DIAGNOSIS — Z71.3 DIETARY COUNSELING AND SURVEILLANCE: Primary | ICD-10-CM

## 2024-12-23 DIAGNOSIS — H61.22 HEARING LOSS OF LEFT EAR DUE TO CERUMEN IMPACTION: ICD-10-CM

## 2024-12-23 DIAGNOSIS — E11.8 DM (DIABETES MELLITUS), TYPE 2 WITH COMPLICATIONS (HCC): ICD-10-CM

## 2024-12-23 LAB — GLUCOSE, POC: 132 MG/DL

## 2024-12-23 PROCEDURE — 82962 GLUCOSE BLOOD TEST: CPT | Performed by: NURSE PRACTITIONER

## 2024-12-23 PROCEDURE — 3046F HEMOGLOBIN A1C LEVEL >9.0%: CPT | Performed by: NURSE PRACTITIONER

## 2024-12-23 PROCEDURE — 99213 OFFICE O/P EST LOW 20 MIN: CPT | Performed by: NURSE PRACTITIONER

## 2024-12-23 PROCEDURE — 83036 HEMOGLOBIN GLYCOSYLATED A1C: CPT

## 2024-12-23 SDOH — HEALTH STABILITY: MENTAL HEALTH: HOW OFTEN DO YOU HAVE A DRINK CONTAINING ALCOHOL?: NEVER

## 2024-12-23 SDOH — SOCIAL STABILITY: SOCIAL NETWORK: HOW OFTEN DO YOU ATTENT MEETINGS OF THE CLUB OR ORGANIZATION YOU BELONG TO?: NEVER

## 2024-12-23 SDOH — SOCIAL STABILITY: SOCIAL NETWORK: ARE YOU MARRIED, WIDOWED, DIVORCED, SEPARATED, NEVER MARRIED, OR LIVING WITH A PARTNER?: NEVER MARRIED

## 2024-12-23 SDOH — SOCIAL STABILITY: SOCIAL INSECURITY: WITHIN THE LAST YEAR, HAVE YOU BEEN HUMILIATED OR EMOTIONALLY ABUSED IN OTHER WAYS BY YOUR PARTNER OR EX-PARTNER?: NO

## 2024-12-23 SDOH — ECONOMIC STABILITY: FOOD INSECURITY: WITHIN THE PAST 12 MONTHS, YOU WORRIED THAT YOUR FOOD WOULD RUN OUT BEFORE YOU GOT MONEY TO BUY MORE.: NEVER TRUE

## 2024-12-23 SDOH — SOCIAL STABILITY: SOCIAL INSECURITY: WITHIN THE LAST YEAR, HAVE YOU BEEN AFRAID OF YOUR PARTNER OR EX-PARTNER?: NO

## 2024-12-23 SDOH — ECONOMIC STABILITY: FOOD INSECURITY: WITHIN THE PAST 12 MONTHS, THE FOOD YOU BOUGHT JUST DIDN'T LAST AND YOU DIDN'T HAVE MONEY TO GET MORE.: NEVER TRUE

## 2024-12-23 SDOH — ECONOMIC STABILITY: TRANSPORTATION INSECURITY
IN THE PAST 12 MONTHS, HAS LACK OF TRANSPORTATION KEPT YOU FROM MEETINGS, WORK, OR FROM GETTING THINGS NEEDED FOR DAILY LIVING?: NO

## 2024-12-23 SDOH — SOCIAL STABILITY: SOCIAL NETWORK: HOW OFTEN DO YOU GET TOGETHER WITH FRIENDS OR RELATIVES?: NEVER

## 2024-12-23 SDOH — HEALTH STABILITY: PHYSICAL HEALTH: ON AVERAGE, HOW MANY DAYS PER WEEK DO YOU ENGAGE IN MODERATE TO STRENUOUS EXERCISE (LIKE A BRISK WALK)?: 3 DAYS

## 2024-12-23 SDOH — HEALTH STABILITY: MENTAL HEALTH: HOW MANY STANDARD DRINKS CONTAINING ALCOHOL DO YOU HAVE ON A TYPICAL DAY?: PATIENT DOES NOT DRINK

## 2024-12-23 SDOH — ECONOMIC STABILITY: INCOME INSECURITY: IN THE LAST 12 MONTHS, WAS THERE A TIME WHEN YOU WERE NOT ABLE TO PAY THE MORTGAGE OR RENT ON TIME?: NO

## 2024-12-23 SDOH — ECONOMIC STABILITY: TRANSPORTATION INSECURITY
IN THE PAST 12 MONTHS, HAS THE LACK OF TRANSPORTATION KEPT YOU FROM MEDICAL APPOINTMENTS OR FROM GETTING MEDICATIONS?: NO

## 2024-12-23 SDOH — SOCIAL STABILITY: SOCIAL NETWORK: HOW OFTEN DO YOU ATTEND CHURCH OR RELIGIOUS SERVICES?: NEVER

## 2024-12-23 SDOH — ECONOMIC STABILITY: INCOME INSECURITY: HOW HARD IS IT FOR YOU TO PAY FOR THE VERY BASICS LIKE FOOD, HOUSING, MEDICAL CARE, AND HEATING?: NOT HARD AT ALL

## 2024-12-23 SDOH — HEALTH STABILITY: PHYSICAL HEALTH: ON AVERAGE, HOW MANY MINUTES DO YOU ENGAGE IN EXERCISE AT THIS LEVEL?: 20 MIN

## 2024-12-23 ASSESSMENT — PATIENT HEALTH QUESTIONNAIRE - PHQ9
SUM OF ALL RESPONSES TO PHQ QUESTIONS 1-9: 0
SUM OF ALL RESPONSES TO PHQ QUESTIONS 1-9: 0
SUM OF ALL RESPONSES TO PHQ9 QUESTIONS 1 & 2: 0
SUM OF ALL RESPONSES TO PHQ QUESTIONS 1-9: 0
1. LITTLE INTEREST OR PLEASURE IN DOING THINGS: NOT AT ALL
2. FEELING DOWN, DEPRESSED OR HOPELESS: NOT AT ALL
SUM OF ALL RESPONSES TO PHQ QUESTIONS 1-9: 0

## 2024-12-23 NOTE — PROGRESS NOTES
Chief Complaint   Patient presents with    Follow-up     Post Flu and ear infection- still having trouble with ears.     Diabetes      [facility]:  [place].  Saira Cruz LPN    Patient name and date of birth verified by .  Patient given an after visit summary.  Advised to schedule next appointment before leaving clinic office.  Patient verbalized understanding of all information given at time of visit. Saira Cruz LPN    It was recommended to patient to use the ear drops to help with the wax removal on her return visit.  Saira Cruz LPN

## 2024-12-23 NOTE — PROGRESS NOTES
Chief Complaint   Patient presents with    Follow-up     Post Flu and ear infection- still having trouble with ears.     Diabetes   /68 (Site: Left Upper Arm, Position: Sitting, Cuff Size: Medium Adult)   Pulse (!) 103   Temp 97 °F (36.1 °C) (Temporal)   Ht 1.62 m (5' 3.78\")   Wt 79.7 kg (175 lb 12.8 oz)   LMP 12/10/2024 (Approximate)   SpO2 97%   BMI 30.38 kg/m²   \"Have you been to the ER, urgent care clinic since your last visit?  Hospitalized since your last visit?\"    YES - When: approximately 21 days ago.  Where and Why: [facility]- ear pain and flu .    “Have you seen or consulted any other health care providers outside our system since your last visit?”    NO    Have you had a mammogram?”   NO    No breast cancer screening on file      “Have you had a pap smear?”    NO    No cervical cancer screening on file       “Have you had a colorectal cancer screening such as a colonoscopy/FIT/Cologuard?    NO    No colonoscopy on file  No cologuard on file  No FIT/FOBT on file   No flexible sigmoidoscopy on file     “Have you had a diabetic eye exam?”    NO     No diabetic eye exam on file          Results for orders placed or performed in visit on 12/23/24   AMB POC GLUCOSE BLOOD, BY GLUCOSE MONITORING DEVICE   Result Value Ref Range    Glucose,  MG/DL     This is a non-fasting glucose

## 2024-12-23 NOTE — PROGRESS NOTES
[facility] / [facility]   Nutrition Assessment - Medical Nutrition Therapy   FOLLOW-UP EVALUATION         Patient Name: Paula Ferguson : 1980   Reason for visit: Follow up: DM2   Referral Source: JANET Date: 2024     Ht: Ht Readings from Last 1 Encounters:   24 1.6 m (5' 3\")    Current Wt: Wt Readings from Last 1 Encounters:   24 79 kg (174 lb 1.6 oz)      BMI: 30.84 kg/m2 Category: Class 1 Obesity (30 - 34.9)     Pertinent Medications:     Current Outpatient Medications:     oseltamivir (TAMIFLU) 75 MG capsule, Take 1 capsule by mouth 2 times daily, Disp: , Rfl:     promethazine-dextromethorphan (PROMETHAZINE-DM) 6.25-15 MG/5ML syrup, GIVE 5 ML BY MOUTH EVERY 4 HOURS AS NEEDED FOR COUGH/CONGESTION., Disp: , Rfl:     LANTUS SOLOSTAR 100 UNIT/ML injection pen, Si units sc qam and 40 units sc in the evening. Please dispense name brand at price point $35 per box.  Dispense QS for 85 units per day., Disp: 5 Adjustable Dose Pre-filled Pen Syringe, Rfl: 3    insulin lispro, 1 Unit Dial, (HUMALOG KWIKPEN) 100 UNIT/ML SOPN, 10 units sc qam and 10 units sc with dinner.  Please honor soledad, Disp: 5 Adjustable Dose Pre-filled Pen Syringe, Rfl: 3    fluconazole (DIFLUCAN) 150 MG tablet, Take 1 po every 72 hours for vaginal itching as needed., Disp: 2 tablet, Rfl: 5    metFORMIN (GLUCOPHAGE) 500 MG tablet, 2 po qam and 1 po with dinner, Disp: 270 tablet, Rfl: 3    amitriptyline (ELAVIL) 10 MG tablet, Take 1 tablet by mouth nightly For hands and feet, Disp: 90 tablet, Rfl: 1    lisinopril (PRINIVIL;ZESTRIL) 5 MG tablet, Take 2 tablets by mouth daily NOT SENT TO PHARMACY, to update record., Disp: 90 tablet, Rfl: 3    Insulin Pen Needle (PEN NEEDLES) 32G X 5 MM MISC, To be used with solostar pen, Disp: 50 each, Rfl: 1     Biochemical Data:   Hemoglobin A1C   Date Value Ref Range Status   10/18/2024 9.9 (H) 4.0 - 5.6 % Final     Comment:     (NOTE)  HbA1C

## 2024-12-24 LAB
EST. AVERAGE GLUCOSE BLD GHB EST-MCNC: 295 MG/DL
HBA1C MFR BLD: 11.9 % (ref 4–5.6)

## 2025-01-06 DIAGNOSIS — E11.8 DM (DIABETES MELLITUS), TYPE 2 WITH COMPLICATIONS (HCC): Primary | ICD-10-CM

## 2025-01-06 NOTE — PROGRESS NOTES
1/6/2025  Diagnoses and all orders for this visit:    DM (diabetes mellitus), type 2 with complications (HCC)  -     Hemoglobin A1C; Future      Adelina Gregg, SAMANTHA, FNP-BC, BC-ADM  Board Certified in Advanced Diabetes Management

## 2025-01-23 ENCOUNTER — HOSPITAL ENCOUNTER (OUTPATIENT)
Facility: HOSPITAL | Age: 45
Setting detail: SPECIMEN
Discharge: HOME | End: 2025-01-26

## 2025-01-23 ENCOUNTER — LAB (OUTPATIENT)
Age: 45
End: 2025-01-23

## 2025-01-23 DIAGNOSIS — E11.8 DM (DIABETES MELLITUS), TYPE 2 WITH COMPLICATIONS (HCC): ICD-10-CM

## 2025-01-23 PROCEDURE — 83036 HEMOGLOBIN GLYCOSYLATED A1C: CPT

## 2025-01-23 NOTE — PROGRESS NOTES
Patient's name and  verified. Ordered lab collected from right AC without difficulty by GERONIMO Lubin RN. Patient discharged in stable condition.

## 2025-01-24 LAB
EST. AVERAGE GLUCOSE BLD GHB EST-MCNC: 303 MG/DL
HBA1C MFR BLD: 12.2 % (ref 4–5.6)

## 2025-02-24 ENCOUNTER — OFFICE VISIT (OUTPATIENT)
Age: 45
End: 2025-02-24

## 2025-02-24 DIAGNOSIS — Z71.3 DIETARY COUNSELING AND SURVEILLANCE: Primary | ICD-10-CM

## 2025-02-24 NOTE — PROGRESS NOTES
[facility] / [facility]   Nutrition Assessment - Medical Nutrition Therapy   FOLLOW-UP EVALUATION         Patient Name: Paula Ferguson : 1980   Reason for visit: Follow up: T2DM   Referral Source:  Date: 2025     Ht: Ht Readings from Last 1 Encounters:   24 1.62 m (5' 3.78\")    Current Wt: Wt Readings from Last 1 Encounters:   24 79.7 kg (175 lb 12.8 oz)      BMI: 30.38 kg/m2 Category: Class 1 Obesity (30 - 34.9)     Pertinent Medications:     Current Outpatient Medications:     oseltamivir (TAMIFLU) 75 MG capsule, Take 1 capsule by mouth 2 times daily (Patient not taking: Reported on 2024), Disp: , Rfl:     promethazine-dextromethorphan (PROMETHAZINE-DM) 6.25-15 MG/5ML syrup, GIVE 5 ML BY MOUTH EVERY 4 HOURS AS NEEDED FOR COUGH/CONGESTION. (Patient not taking: Reported on 2024), Disp: , Rfl:     LANTUS SOLOSTAR 100 UNIT/ML injection pen, Si units sc qam and 40 units sc in the evening. Please dispense name brand at price point $35 per box.  Dispense QS for 85 units per day., Disp: 5 Adjustable Dose Pre-filled Pen Syringe, Rfl: 3    insulin lispro, 1 Unit Dial, (HUMALOG KWIKPEN) 100 UNIT/ML SOPN, 10 units sc qam and 10 units sc with dinner.  Please honor soledad, Disp: 5 Adjustable Dose Pre-filled Pen Syringe, Rfl: 3    fluconazole (DIFLUCAN) 150 MG tablet, Take 1 po every 72 hours for vaginal itching as needed. (Patient not taking: Reported on 2024), Disp: 2 tablet, Rfl: 5    metFORMIN (GLUCOPHAGE) 500 MG tablet, 2 po qam and 1 po with dinner, Disp: 270 tablet, Rfl: 3    amitriptyline (ELAVIL) 10 MG tablet, Take 1 tablet by mouth nightly For hands and feet, Disp: 90 tablet, Rfl: 1    lisinopril (PRINIVIL;ZESTRIL) 5 MG tablet, Take 2 tablets by mouth daily NOT SENT TO PHARMACY, to update record., Disp: 90 tablet, Rfl: 3    Insulin Pen Needle (PEN NEEDLES) 32G X 5 MM MISC, To be used with solostar pen, Disp: 50 each, Rfl: 1

## 2025-03-24 ENCOUNTER — OFFICE VISIT (OUTPATIENT)
Age: 45
End: 2025-03-24

## 2025-03-24 DIAGNOSIS — Z71.3 DIETARY COUNSELING AND SURVEILLANCE: Primary | ICD-10-CM

## 2025-03-24 NOTE — PROGRESS NOTES
[facility]   Nutrition Assessment - Medical Nutrition Therapy   FOLLOW-UP EVALUATION         Patient Name: Paula Ferguson : 1980   Reason for visit: Follow up: T2DM   Referral Source:  Date: 3/24/2025     Ht: Ht Readings from Last 1 Encounters:   24 1.62 m (5' 3.78\")    Current Wt: Wt Readings from Last 1 Encounters:   24 79.7 kg (175 lb 12.8 oz)      BMI: 30.38 kg/m2 Category: Class 1 Obesity (30 - 34.9)     Pertinent Medications:     Current Outpatient Medications:     oseltamivir (TAMIFLU) 75 MG capsule, Take 1 capsule by mouth 2 times daily (Patient not taking: Reported on 2024), Disp: , Rfl:     promethazine-dextromethorphan (PROMETHAZINE-DM) 6.25-15 MG/5ML syrup, GIVE 5 ML BY MOUTH EVERY 4 HOURS AS NEEDED FOR COUGH/CONGESTION. (Patient not taking: Reported on 2024), Disp: , Rfl:     LANTUS SOLOSTAR 100 UNIT/ML injection pen, Si units sc qam and 40 units sc in the evening. Please dispense name brand at price point $35 per box.  Dispense QS for 85 units per day., Disp: 5 Adjustable Dose Pre-filled Pen Syringe, Rfl: 3    insulin lispro, 1 Unit Dial, (HUMALOG KWIKPEN) 100 UNIT/ML SOPN, 10 units sc qam and 10 units sc with dinner.  Please honor soledad, Disp: 5 Adjustable Dose Pre-filled Pen Syringe, Rfl: 3    fluconazole (DIFLUCAN) 150 MG tablet, Take 1 po every 72 hours for vaginal itching as needed. (Patient not taking: Reported on 2024), Disp: 2 tablet, Rfl: 5    metFORMIN (GLUCOPHAGE) 500 MG tablet, 2 po qam and 1 po with dinner, Disp: 270 tablet, Rfl: 3    amitriptyline (ELAVIL) 10 MG tablet, Take 1 tablet by mouth nightly For hands and feet, Disp: 90 tablet, Rfl: 1    lisinopril (PRINIVIL;ZESTRIL) 5 MG tablet, Take 2 tablets by mouth daily NOT SENT TO PHARMACY, to update record., Disp: 90 tablet, Rfl: 3    Insulin Pen Needle (PEN NEEDLES) 32G X 5 MM MISC, To be used with solostar pen, Disp: 50 each, Rfl: 1

## 2025-04-29 ENCOUNTER — OFFICE VISIT (OUTPATIENT)
Age: 45
End: 2025-04-29

## 2025-04-29 ENCOUNTER — APPOINTMENT (OUTPATIENT)
Facility: HOSPITAL | Age: 45
DRG: 758 | End: 2025-04-29
Payer: MEDICAID

## 2025-04-29 ENCOUNTER — HOSPITAL ENCOUNTER (INPATIENT)
Facility: HOSPITAL | Age: 45
LOS: 2 days | Discharge: HOME | DRG: 758 | End: 2025-05-01
Attending: EMERGENCY MEDICINE | Admitting: INTERNAL MEDICINE
Payer: MEDICAID

## 2025-04-29 VITALS
OXYGEN SATURATION: 99 % | HEART RATE: 94 BPM | SYSTOLIC BLOOD PRESSURE: 149 MMHG | BODY MASS INDEX: 30.59 KG/M2 | TEMPERATURE: 97.3 F | WEIGHT: 177 LBS | DIASTOLIC BLOOD PRESSURE: 73 MMHG

## 2025-04-29 DIAGNOSIS — R00.0 TACHYCARDIA: ICD-10-CM

## 2025-04-29 DIAGNOSIS — T38.3X5A HYPOGLYCEMIC REACTION TO INSULIN: ICD-10-CM

## 2025-04-29 DIAGNOSIS — E11.8 DM (DIABETES MELLITUS), TYPE 2 WITH COMPLICATIONS (HCC): ICD-10-CM

## 2025-04-29 DIAGNOSIS — A41.9 SEPSIS, DUE TO UNSPECIFIED ORGANISM, UNSPECIFIED WHETHER ACUTE ORGAN DYSFUNCTION PRESENT (HCC): Primary | ICD-10-CM

## 2025-04-29 DIAGNOSIS — E16.0 HYPOGLYCEMIC REACTION TO INSULIN: ICD-10-CM

## 2025-04-29 DIAGNOSIS — R42 DIZZINESS: Primary | ICD-10-CM

## 2025-04-29 DIAGNOSIS — E16.2 HYPOGLYCEMIA: ICD-10-CM

## 2025-04-29 PROBLEM — N39.0 UTI (URINARY TRACT INFECTION): Status: ACTIVE | Noted: 2025-04-29

## 2025-04-29 LAB
ALBUMIN SERPL-MCNC: 3.7 G/DL (ref 3.5–5.2)
ALBUMIN/GLOB SERPL: 1.1 (ref 1.1–2.2)
ALP SERPL-CCNC: 101 U/L (ref 35–104)
ALT SERPL-CCNC: 17 U/L (ref 10–35)
AMPHET UR QL SCN: NEGATIVE
ANION GAP SERPL CALC-SCNC: 12 MMOL/L (ref 2–12)
APPEARANCE UR: ABNORMAL
AST SERPL-CCNC: 21 U/L (ref 10–35)
BACTERIA URNS QL MICRO: ABNORMAL /HPF
BARBITURATES UR QL SCN: NEGATIVE
BASOPHILS # BLD: 0.05 K/UL (ref 0–0.1)
BASOPHILS NFR BLD: 0.6 % (ref 0–1)
BENZODIAZ UR QL: NEGATIVE
BILIRUB SERPL-MCNC: 0.4 MG/DL (ref 0.2–1)
BILIRUB UR QL: NEGATIVE
BUN SERPL-MCNC: 23 MG/DL (ref 6–20)
BUN/CREAT SERPL: 41 (ref 12–20)
CALCIUM SERPL-MCNC: 9.1 MG/DL (ref 8.6–10)
CANNABINOIDS UR QL SCN: NEGATIVE
CHLORIDE SERPL-SCNC: 103 MMOL/L (ref 98–107)
CO2 SERPL-SCNC: 25 MMOL/L (ref 22–29)
COCAINE UR QL SCN: NEGATIVE
COLOR UR: ABNORMAL
CREAT SERPL-MCNC: 0.56 MG/DL (ref 0.5–0.9)
DIFFERENTIAL METHOD BLD: ABNORMAL
EOSINOPHIL # BLD: 0.26 K/UL (ref 0–0.4)
EOSINOPHIL NFR BLD: 3.2 % (ref 0–7)
EPITH CASTS URNS QL MICRO: ABNORMAL /LPF
ERYTHROCYTE [DISTWIDTH] IN BLOOD BY AUTOMATED COUNT: 17.3 % (ref 11.5–14.5)
EST. AVERAGE GLUCOSE BLD GHB EST-MCNC: 283 MG/DL
GLOBULIN SER CALC-MCNC: 3.4 G/DL (ref 2–4)
GLUCOSE BLD STRIP.AUTO-MCNC: 113 MG/DL (ref 65–117)
GLUCOSE BLD STRIP.AUTO-MCNC: 157 MG/DL (ref 65–117)
GLUCOSE BLD STRIP.AUTO-MCNC: 17 MG/DL (ref 65–117)
GLUCOSE BLD STRIP.AUTO-MCNC: 215 MG/DL (ref 65–117)
GLUCOSE BLD STRIP.AUTO-MCNC: 221 MG/DL (ref 65–117)
GLUCOSE BLD STRIP.AUTO-MCNC: 229 MG/DL (ref 65–117)
GLUCOSE BLD STRIP.AUTO-MCNC: 257 MG/DL (ref 65–117)
GLUCOSE BLD STRIP.AUTO-MCNC: 72 MG/DL (ref 65–117)
GLUCOSE SERPL-MCNC: 70 MG/DL (ref 65–100)
GLUCOSE UR STRIP.AUTO-MCNC: 100 MG/DL
GLUCOSE, POC: 30 MG/DL
GLUCOSE, POC: 36 MG/DL
HBA1C MFR BLD: 11.5 % (ref 4–5.6)
HCT VFR BLD AUTO: 29.2 % (ref 35–47)
HGB BLD-MCNC: 9 G/DL (ref 11.5–16)
HGB UR QL STRIP: ABNORMAL
IMM GRANULOCYTES # BLD AUTO: 0.03 K/UL (ref 0–0.04)
IMM GRANULOCYTES NFR BLD AUTO: 0.4 % (ref 0–0.5)
KETONES UR QL STRIP.AUTO: NEGATIVE MG/DL
LACTATE BLD-SCNC: 2.28 MMOL/L (ref 0.4–2)
LACTATE BLD-SCNC: 2.51 MMOL/L (ref 0.4–2)
LACTATE SERPL-SCNC: 1.7 MMOL/L (ref 0.4–2)
LEUKOCYTE ESTERASE UR QL STRIP.AUTO: ABNORMAL
LYMPHOCYTES # BLD: 2 K/UL (ref 0.8–3.5)
LYMPHOCYTES NFR BLD: 24.5 % (ref 12–49)
Lab: NORMAL
MAGNESIUM SERPL-MCNC: 1.9 MG/DL (ref 1.6–2.6)
MCH RBC QN AUTO: 21.8 PG (ref 26–34)
MCHC RBC AUTO-ENTMCNC: 30.8 G/DL (ref 30–36.5)
MCV RBC AUTO: 70.7 FL (ref 80–99)
METHADONE UR QL: NEGATIVE
MONOCYTES # BLD: 0.61 K/UL (ref 0–1)
MONOCYTES NFR BLD: 7.5 % (ref 5–13)
NEUTS SEG # BLD: 5.2 K/UL (ref 1.8–8)
NEUTS SEG NFR BLD: 63.8 % (ref 32–75)
NITRITE UR QL STRIP.AUTO: NEGATIVE
NRBC # BLD: 0 K/UL (ref 0–0.01)
NRBC BLD-RTO: 0 PER 100 WBC
OPIATES UR QL: NEGATIVE
PCP UR QL: NEGATIVE
PH UR STRIP: 5.5 (ref 5–8)
PLATELET # BLD AUTO: 284 K/UL (ref 150–400)
PMV BLD AUTO: 9.3 FL (ref 8.9–12.9)
POTASSIUM SERPL-SCNC: 3.1 MMOL/L (ref 3.5–5.1)
PROCALCITONIN SERPL-MCNC: <0.05 NG/ML
PROT SERPL-MCNC: 7.1 G/DL (ref 6.4–8.3)
PROT UR STRIP-MCNC: ABNORMAL MG/DL
RBC # BLD AUTO: 4.13 M/UL (ref 3.8–5.2)
RBC #/AREA URNS HPF: ABNORMAL /HPF
SERVICE CMNT-IMP: ABNORMAL
SERVICE CMNT-IMP: NORMAL
SERVICE CMNT-IMP: NORMAL
SODIUM SERPL-SCNC: 140 MMOL/L (ref 136–145)
SP GR UR REFRACTOMETRY: 1.02 (ref 1–1.03)
URINE CULTURE IF INDICATED: ABNORMAL
UROBILINOGEN UR QL STRIP.AUTO: 0.2 EU/DL (ref 0.2–1)
WBC # BLD AUTO: 8.2 K/UL (ref 3.6–11)
WBC URNS QL MICRO: ABNORMAL /HPF (ref 0–4)
YEAST URNS QL MICRO: PRESENT

## 2025-04-29 PROCEDURE — 2500000003 HC RX 250 WO HCPCS: Performed by: EMERGENCY MEDICINE

## 2025-04-29 PROCEDURE — 99285 EMERGENCY DEPT VISIT HI MDM: CPT

## 2025-04-29 PROCEDURE — 6370000000 HC RX 637 (ALT 250 FOR IP): Performed by: INTERNAL MEDICINE

## 2025-04-29 PROCEDURE — 99213 OFFICE O/P EST LOW 20 MIN: CPT | Performed by: PHYSICIAN ASSISTANT

## 2025-04-29 PROCEDURE — 81001 URINALYSIS AUTO W/SCOPE: CPT

## 2025-04-29 PROCEDURE — 2060000000 HC ICU INTERMEDIATE R&B

## 2025-04-29 PROCEDURE — 6360000004 HC RX CONTRAST MEDICATION: Performed by: INTERNAL MEDICINE

## 2025-04-29 PROCEDURE — 85025 COMPLETE CBC W/AUTO DIFF WBC: CPT

## 2025-04-29 PROCEDURE — 83735 ASSAY OF MAGNESIUM: CPT

## 2025-04-29 PROCEDURE — 2580000003 HC RX 258: Performed by: INTERNAL MEDICINE

## 2025-04-29 PROCEDURE — 83605 ASSAY OF LACTIC ACID: CPT

## 2025-04-29 PROCEDURE — 87040 BLOOD CULTURE FOR BACTERIA: CPT

## 2025-04-29 PROCEDURE — 71045 X-RAY EXAM CHEST 1 VIEW: CPT

## 2025-04-29 PROCEDURE — 80307 DRUG TEST PRSMV CHEM ANLYZR: CPT

## 2025-04-29 PROCEDURE — 6360000002 HC RX W HCPCS: Performed by: EMERGENCY MEDICINE

## 2025-04-29 PROCEDURE — 83036 HEMOGLOBIN GLYCOSYLATED A1C: CPT

## 2025-04-29 PROCEDURE — 36415 COLL VENOUS BLD VENIPUNCTURE: CPT

## 2025-04-29 PROCEDURE — 96360 HYDRATION IV INFUSION INIT: CPT

## 2025-04-29 PROCEDURE — 80053 COMPREHEN METABOLIC PANEL: CPT

## 2025-04-29 PROCEDURE — 2500000003 HC RX 250 WO HCPCS: Performed by: INTERNAL MEDICINE

## 2025-04-29 PROCEDURE — 6360000002 HC RX W HCPCS: Performed by: INTERNAL MEDICINE

## 2025-04-29 PROCEDURE — 2580000003 HC RX 258: Performed by: EMERGENCY MEDICINE

## 2025-04-29 PROCEDURE — 71275 CT ANGIOGRAPHY CHEST: CPT

## 2025-04-29 PROCEDURE — 82962 GLUCOSE BLOOD TEST: CPT

## 2025-04-29 PROCEDURE — 87086 URINE CULTURE/COLONY COUNT: CPT

## 2025-04-29 PROCEDURE — 84145 PROCALCITONIN (PCT): CPT

## 2025-04-29 PROCEDURE — 82962 GLUCOSE BLOOD TEST: CPT | Performed by: PHYSICIAN ASSISTANT

## 2025-04-29 PROCEDURE — 87147 CULTURE TYPE IMMUNOLOGIC: CPT

## 2025-04-29 RX ORDER — ENOXAPARIN SODIUM 100 MG/ML
40 INJECTION SUBCUTANEOUS DAILY
Status: DISCONTINUED | OUTPATIENT
Start: 2025-04-29 | End: 2025-04-30

## 2025-04-29 RX ORDER — POTASSIUM CHLORIDE 750 MG/1
40 TABLET, EXTENDED RELEASE ORAL ONCE
Status: COMPLETED | OUTPATIENT
Start: 2025-04-29 | End: 2025-04-29

## 2025-04-29 RX ORDER — ONDANSETRON 2 MG/ML
4 INJECTION INTRAMUSCULAR; INTRAVENOUS EVERY 6 HOURS PRN
Status: DISCONTINUED | OUTPATIENT
Start: 2025-04-29 | End: 2025-05-01 | Stop reason: HOSPADM

## 2025-04-29 RX ORDER — SODIUM CHLORIDE 9 MG/ML
INJECTION, SOLUTION INTRAVENOUS CONTINUOUS
Status: DISCONTINUED | OUTPATIENT
Start: 2025-04-29 | End: 2025-04-30

## 2025-04-29 RX ORDER — ACETAMINOPHEN 650 MG/1
650 SUPPOSITORY RECTAL EVERY 6 HOURS PRN
Status: DISCONTINUED | OUTPATIENT
Start: 2025-04-29 | End: 2025-05-01 | Stop reason: HOSPADM

## 2025-04-29 RX ORDER — LABETALOL HYDROCHLORIDE 5 MG/ML
10 INJECTION, SOLUTION INTRAVENOUS EVERY 6 HOURS PRN
Status: DISCONTINUED | OUTPATIENT
Start: 2025-04-29 | End: 2025-05-01 | Stop reason: HOSPADM

## 2025-04-29 RX ORDER — OXYCODONE HYDROCHLORIDE 5 MG/1
5 TABLET ORAL EVERY 4 HOURS PRN
Refills: 0 | Status: DISCONTINUED | OUTPATIENT
Start: 2025-04-29 | End: 2025-05-01 | Stop reason: HOSPADM

## 2025-04-29 RX ORDER — INSULIN LISPRO 100 [IU]/ML
0-4 INJECTION, SOLUTION INTRAVENOUS; SUBCUTANEOUS
Status: DISCONTINUED | OUTPATIENT
Start: 2025-04-29 | End: 2025-04-30

## 2025-04-29 RX ORDER — SODIUM CHLORIDE 0.9 % (FLUSH) 0.9 %
5-40 SYRINGE (ML) INJECTION PRN
Status: DISCONTINUED | OUTPATIENT
Start: 2025-04-29 | End: 2025-05-01 | Stop reason: HOSPADM

## 2025-04-29 RX ORDER — IOPAMIDOL 755 MG/ML
100 INJECTION, SOLUTION INTRAVASCULAR
Status: COMPLETED | OUTPATIENT
Start: 2025-04-29 | End: 2025-04-29

## 2025-04-29 RX ORDER — POLYETHYLENE GLYCOL 3350 17 G/17G
17 POWDER, FOR SOLUTION ORAL DAILY PRN
Status: DISCONTINUED | OUTPATIENT
Start: 2025-04-29 | End: 2025-05-01 | Stop reason: HOSPADM

## 2025-04-29 RX ORDER — ACETAMINOPHEN 325 MG/1
650 TABLET ORAL EVERY 6 HOURS PRN
Status: DISCONTINUED | OUTPATIENT
Start: 2025-04-29 | End: 2025-05-01 | Stop reason: HOSPADM

## 2025-04-29 RX ORDER — DEXTROSE MONOHYDRATE 100 MG/ML
INJECTION, SOLUTION INTRAVENOUS CONTINUOUS PRN
Status: DISCONTINUED | OUTPATIENT
Start: 2025-04-29 | End: 2025-05-01 | Stop reason: HOSPADM

## 2025-04-29 RX ORDER — DEXTROSE MONOHYDRATE 25 G/50ML
25 INJECTION, SOLUTION INTRAVENOUS ONCE
Status: COMPLETED | OUTPATIENT
Start: 2025-04-29 | End: 2025-04-29

## 2025-04-29 RX ORDER — METOPROLOL TARTRATE 25 MG/1
12.5 TABLET, FILM COATED ORAL 2 TIMES DAILY
Status: DISCONTINUED | OUTPATIENT
Start: 2025-04-29 | End: 2025-04-29

## 2025-04-29 RX ORDER — ONDANSETRON 4 MG/1
4 TABLET, ORALLY DISINTEGRATING ORAL EVERY 8 HOURS PRN
Status: DISCONTINUED | OUTPATIENT
Start: 2025-04-29 | End: 2025-05-01 | Stop reason: HOSPADM

## 2025-04-29 RX ORDER — INSULIN LISPRO 100 [IU]/ML
0-8 INJECTION, SOLUTION INTRAVENOUS; SUBCUTANEOUS
Status: DISCONTINUED | OUTPATIENT
Start: 2025-04-30 | End: 2025-04-29

## 2025-04-29 RX ORDER — DEXTROSE MONOHYDRATE 100 MG/ML
INJECTION, SOLUTION INTRAVENOUS CONTINUOUS PRN
Status: DISCONTINUED | OUTPATIENT
Start: 2025-04-29 | End: 2025-04-29 | Stop reason: SDUPTHER

## 2025-04-29 RX ORDER — 0.9 % SODIUM CHLORIDE 0.9 %
1000 INTRAVENOUS SOLUTION INTRAVENOUS ONCE
Status: COMPLETED | OUTPATIENT
Start: 2025-04-29 | End: 2025-04-29

## 2025-04-29 RX ORDER — SODIUM CHLORIDE, SODIUM LACTATE, POTASSIUM CHLORIDE, AND CALCIUM CHLORIDE .6; .31; .03; .02 G/100ML; G/100ML; G/100ML; G/100ML
1000 INJECTION, SOLUTION INTRAVENOUS ONCE
Status: COMPLETED | OUTPATIENT
Start: 2025-04-29 | End: 2025-04-30

## 2025-04-29 RX ORDER — LISINOPRIL 5 MG/1
5 TABLET ORAL DAILY
Status: DISCONTINUED | OUTPATIENT
Start: 2025-04-30 | End: 2025-04-29

## 2025-04-29 RX ORDER — SODIUM CHLORIDE 0.9 % (FLUSH) 0.9 %
5-40 SYRINGE (ML) INJECTION EVERY 12 HOURS SCHEDULED
Status: DISCONTINUED | OUTPATIENT
Start: 2025-04-29 | End: 2025-05-01 | Stop reason: HOSPADM

## 2025-04-29 RX ORDER — SODIUM CHLORIDE 9 MG/ML
INJECTION, SOLUTION INTRAVENOUS PRN
Status: DISCONTINUED | OUTPATIENT
Start: 2025-04-29 | End: 2025-05-01 | Stop reason: HOSPADM

## 2025-04-29 RX ADMIN — VANCOMYCIN HYDROCHLORIDE 1000 MG: 1 INJECTION, POWDER, LYOPHILIZED, FOR SOLUTION INTRAVENOUS at 15:50

## 2025-04-29 RX ADMIN — INSULIN LISPRO 1 UNITS: 100 INJECTION, SOLUTION INTRAVENOUS; SUBCUTANEOUS at 20:53

## 2025-04-29 RX ADMIN — VANCOMYCIN HYDROCHLORIDE 1000 MG: 1 INJECTION, POWDER, LYOPHILIZED, FOR SOLUTION INTRAVENOUS at 15:58

## 2025-04-29 RX ADMIN — IOPAMIDOL 100 ML: 755 INJECTION, SOLUTION INTRAVENOUS at 14:04

## 2025-04-29 RX ADMIN — ENOXAPARIN SODIUM 40 MG: 100 INJECTION SUBCUTANEOUS at 16:00

## 2025-04-29 RX ADMIN — SODIUM CHLORIDE: 0.9 INJECTION, SOLUTION INTRAVENOUS at 20:40

## 2025-04-29 RX ADMIN — SODIUM CHLORIDE: 0.9 INJECTION, SOLUTION INTRAVENOUS at 15:08

## 2025-04-29 RX ADMIN — DEXTROSE MONOHYDRATE 25 G: 25 INJECTION, SOLUTION INTRAVENOUS at 11:02

## 2025-04-29 RX ADMIN — SODIUM CHLORIDE, PRESERVATIVE FREE 10 ML: 5 INJECTION INTRAVENOUS at 20:54

## 2025-04-29 RX ADMIN — SODIUM CHLORIDE 1000 ML: 0.9 INJECTION, SOLUTION INTRAVENOUS at 17:34

## 2025-04-29 RX ADMIN — POTASSIUM CHLORIDE 40 MEQ: 750 TABLET, FILM COATED, EXTENDED RELEASE ORAL at 16:00

## 2025-04-29 RX ADMIN — SODIUM CHLORIDE 1000 ML: 0.9 INJECTION, SOLUTION INTRAVENOUS at 12:55

## 2025-04-29 RX ADMIN — PIPERACILLIN AND TAZOBACTAM 4500 MG: 4; .5 INJECTION, POWDER, FOR SOLUTION INTRAVENOUS at 15:03

## 2025-04-29 RX ADMIN — WATER 2000 MG: 1 INJECTION INTRAMUSCULAR; INTRAVENOUS; SUBCUTANEOUS at 22:13

## 2025-04-29 ASSESSMENT — PATIENT HEALTH QUESTIONNAIRE - PHQ9
SUM OF ALL RESPONSES TO PHQ QUESTIONS 1-9: 9
4. FEELING TIRED OR HAVING LITTLE ENERGY: NEARLY EVERY DAY
SUM OF ALL RESPONSES TO PHQ QUESTIONS 1-9: 9
7. TROUBLE CONCENTRATING ON THINGS, SUCH AS READING THE NEWSPAPER OR WATCHING TELEVISION: NOT AT ALL
9. THOUGHTS THAT YOU WOULD BE BETTER OFF DEAD, OR OF HURTING YOURSELF: NOT AT ALL
1. LITTLE INTEREST OR PLEASURE IN DOING THINGS: NEARLY EVERY DAY
SUM OF ALL RESPONSES TO PHQ QUESTIONS 1-9: 9
2. FEELING DOWN, DEPRESSED OR HOPELESS: NEARLY EVERY DAY
8. MOVING OR SPEAKING SO SLOWLY THAT OTHER PEOPLE COULD HAVE NOTICED. OR THE OPPOSITE, BEING SO FIGETY OR RESTLESS THAT YOU HAVE BEEN MOVING AROUND A LOT MORE THAN USUAL: NOT AT ALL
6. FEELING BAD ABOUT YOURSELF - OR THAT YOU ARE A FAILURE OR HAVE LET YOURSELF OR YOUR FAMILY DOWN: NOT AT ALL
SUM OF ALL RESPONSES TO PHQ QUESTIONS 1-9: 9
5. POOR APPETITE OR OVEREATING: NOT AT ALL
3. TROUBLE FALLING OR STAYING ASLEEP: NOT AT ALL

## 2025-04-29 ASSESSMENT — PAIN - FUNCTIONAL ASSESSMENT
PAIN_FUNCTIONAL_ASSESSMENT: NONE - DENIES PAIN
PAIN_FUNCTIONAL_ASSESSMENT: NONE - DENIES PAIN

## 2025-04-29 ASSESSMENT — LIFESTYLE VARIABLES
HOW MANY STANDARD DRINKS CONTAINING ALCOHOL DO YOU HAVE ON A TYPICAL DAY: PATIENT DOES NOT DRINK
HOW OFTEN DO YOU HAVE A DRINK CONTAINING ALCOHOL: NEVER

## 2025-04-29 NOTE — ED NOTES
Notified hospitalist of rpt lactic results. Additional orders recd.   Family at bedside. Updated on plan of care.

## 2025-04-29 NOTE — PROGRESS NOTES
0840am  Patient taken into intake room to start rooming process for provider visit today. While taking her weight, patient stated she felt dizzy. Pt was instructed to have a seat. This RN could visibly see the patient's diaphretic state and fast change in LOC as soon as she sat down. Pt started to become unresponsive to voice commands. Team called to intake room for assistance. Vitals and BG taken. Provider notified. Orders received for glucose tabs and EMS services. Report given to EMS services.    Results for orders placed or performed in visit on 04/29/25   AMB POC GLUCOSE BLOOD, BY GLUCOSE MONITORING DEVICE   Result Value Ref Range    Glucose, POC 30 MG/DL   AMB POC GLUCOSE BLOOD, BY GLUCOSE MONITORING DEVICE   Result Value Ref Range    Glucose, POC 36 MG/DL

## 2025-04-29 NOTE — ED NOTES
Recheck patient glucose 72. Pt awake and  at bedside. Pt eating att.     Pt sts takes 45u lantus in AM & 40U of lantus at PM.   Pt also takes 15u of humalog kwikpen insulin in AM & takes 10u of humalog kwikpen insulin at night.

## 2025-04-29 NOTE — ED TRIAGE NOTES
Pt to ER BIB EMS for hypogylemia. Pt was ambulatory to mobile health clinic for diabetic check and said to staff patient took her insulin but did not eat. Staff there said her blood sugar was 36 and given 7 glucose tabs.     Upon EMS arrival, oral glucose was given and was .     Pt awakens to painful stimuli, able to state name & .  upon arrival. Pt denies any pain.

## 2025-04-29 NOTE — PROGRESS NOTES
Spiritual Health History and Assessment/Progress Note  [facility]    Initial Encounter,  ,  ,      Name: Paula Ferguson MRN: [id number]    Age: 45 y.o.     Sex: female   Language: Luxembourger   Gnosticism: Non-Buddhism   <principal problem not specified>     Date: 4/29/2025            Total Time Calculated: 40 min              Spiritual Assessment began in [facility]            Encounter Overview/Reason: Initial Encounter  Service Provided For: Patient and family together    Jessica, Belief, Meaning:   Patient identifies as spiritual, is connected with a jessica tradition or spiritual practice, and has beliefs or practices that help with coping during difficult times  Family/Friends identify as spiritual, are connected with a jessica tradition or spiritual practice, and have beliefs or practices that help with coping during difficult times      Importance and Influence:  Patient has spiritual/personal beliefs that influence decisions regarding their health  Family/Friends have spiritual/personal beliefs that influence decisions regarding the patient's health    Community:  Patient feels well-supported. Support system includes: Spouse/Partner, Children, and Extended family  Family/Friends feel well-supported. Support system includes: Spouse/Partner, Children, and Extended family    Assessment and Plan of Care:   [place] [facility]:  Reviewed chart prior to bedside encounter. Pt and  are Luxem[place] speaking only; [place] of [place], [place]. Patient goes by Paula; visit is through [facility]  machine, #[id number] (Jie). Mrs. Mitchell and her  shared that they have 2 adult children, and she was stressed at start of visit as she is needing nurses attention. She and her  seemed happy to speak with  and sharing life review. They have a [employer] jessica but not attending any [employer] currently. Their prayer requests were for her health and healing, their

## 2025-04-29 NOTE — PROGRESS NOTES
Assessment/Plan:    Diagnoses and all orders for this visit:    Dizziness  -     AMB POC GLUCOSE BLOOD, BY GLUCOSE MONITORING DEVICE    Hypoglycemic reaction to insulin    911 called after 7 minutes of attempting glucose oral supplement only brought her blood sugar from 30 to 36 and she remained diaphoretic and minimally responsive    Squad arrived and pt was able to confirm that she was agreeable to transport to ER    She walked into the intake RN room this morning and this was a very acute change in her LOC, she did tell the RN before her sxs were severe that she did not eat and that she used her insulin this morning  Will follow hospital course and make sure that she has close f/u and diabetic education     No follow-up provider specified.    VIGNESH [facility] expressed understanding of this plan. An AVS was printed and given to the patient.      ----------------------------------------------------------------------    No chief complaint on file.      History of Present Illness:  Pt was scheduled for diabetes f/up appt today  In the intake room, she became diaphoretic and unable to answer questions, eyes closed and I was called to evaluate her.  At onset, her blood glucose measured 30. With the assistance of many of our nurses, we prepared for a code if needed and we used sternal rubs to keep her alert enough to chew glucose tabs then we switched to sugar water administered by small spoon sipfulls  She was able to swallow and her heart rate remained around  throughout the visit and her pulse ox was always normal around 98 on room air  Due to the prolonged period of her being minimally responsive, the decision was made after about 7 minutes, to call the squad. We remained with her, actively administering the sugar water until their arrival and the squad gave her a glucose gel that she was able to take     When she was leaving, she was improving and was able to transfer to

## 2025-04-29 NOTE — ED NOTES
Checked on patient. Pt unresponsive, sternal rubbed. Repeat BG 17. Notified ER provider. Additional orders given. Amp D50 given via RAC.     New IV placed by [facility] ER Medic. Rpt  at 1113.     Pt awakens to voice,  at bedside.

## 2025-04-29 NOTE — ED PROVIDER NOTES
[facility] DEPARTMENT  EMERGENCY DEPARTMENT ENCOUNTER      Pt Name: Paula Ferguson  MRN: [id number]  Birthdate 1980  Date of evaluation: 2025  Provider: Robbie Lugo DO    CHIEF COMPLAINT       Chief Complaint   Patient presents with    Hypoglycemia                HISTORY OF PRESENT ILLNESS   (Location/Symptom, Timing/Onset, Context/Setting, Quality, Duration, Modifying Factors, Severity)  Note limiting factors.   45-year-old female presents for hypoglycemia.  She went to her mobile clinic for diabetic check where she was found to have low blood glucose.  She notes that she took her insulin this morning but then did not eat.  Blood sugar was 36.  EMS brought her to the ER where she had already received several oral glucose tabs.  She arrives awake alert oriented answering questions appropriately seeing that she feels somewhat dizzy which is what usually happens when her blood sugar is low.  She denies nausea or vomiting.  No other complaint            Review of External Medical Records:     Nursing Notes were reviewed.    REVIEW OF SYSTEMS    (2-9 systems for level 4, 10 or more for level 5)     Review of Systems    Except as noted above the remainder of the review of systems was reviewed and negative.       PAST MEDICAL HISTORY   No past medical history on file.      SURGICAL HISTORY     No past surgical history on file.      CURRENT MEDICATIONS       Previous Medications    AMITRIPTYLINE (ELAVIL) 10 MG TABLET    Take 1 tablet by mouth nightly For hands and feet    FLUCONAZOLE (DIFLUCAN) 150 MG TABLET    Take 1 po every 72 hours for vaginal itching as needed.    INSULIN LISPRO, 1 UNIT DIAL, (HUMALOG KWIKPEN) 100 UNIT/ML SOPN    10 units sc qam and 10 units sc with dinner.  Please honor goodrx    INSULIN PEN NEEDLE (PEN NEEDLES) 32G X 5 MM MISC    To be used with solostar pen    LANTUS SOLOSTAR 100 UNIT/ML INJECTION PEN    Si units sc qam and 40 units sc in the evening.

## 2025-04-29 NOTE — ED NOTES
TRANSFER - OUT REPORT:    Verbal report given to LEONARDO RN on Paula Ferguson  being transferred to [place] [id number] for routine progression of patient care       Report consisted of patient's Situation, Background, Assessment and   Recommendations(SBAR).     Information from the following report(s) Nurse Handoff Report, ED Encounter Summary, ED SBAR, and MAR was reviewed with the receiving nurse.    [place] Fall Assessment:    Presents to emergency department  because of falls (Syncope, seizure, or loss of consciousness): Yes  Age > 70: No  Altered Mental Status, Intoxication with alcohol or substance confusion (Disorientation, impaired judgment, poor safety awaremess, or inability to follow instructions): No  Impaired Mobility: Ambulates or transfers with assistive devices or assistance; Unable to ambulate or transer.: No  Nursing Judgement: No          Lines:   Peripheral IV 04/29/25 Right Antecubital (Active)       Peripheral IV 04/29/25 Left;Ventral Forearm (Active)        Opportunity for questions and clarification was provided.      Patient transported with:[facility]WAITING [facility] TRANSPORT

## 2025-04-29 NOTE — DISCHARGE INSTRUCTIONS
Hospital Medicine DISCHARGE INSTRUCTIONS    NAME: Paula Ferguson   :  1980   MRN:  [id number]     Date:     2025    Admission date: 2025     Discharge date:  2025     Reason for your admission:  Hypoglycemia [E16.2]  Tachycardia [R00.0]  Sepsis, due to unspecified organism, unspecified whether acute organ dysfunction present (HCC) [A41.9]    Discharge Diagnoses:  DM II with hypoglycemia  Fungal UTI    DISCHARGE INSTRUCTIONS:    Thank you for allowing us to participate in your care. Your discharging Hospitalist is Monroe Fisher MD. You were admitted for evaluation and treatment for the above diagnoses.    Medications:     It is important that medications are taken exactly as they are prescribed on the discharge medication instructions and keep them your  in the bottles provided by the pharmacist.   Keep a list of the medication names, dosages, and times to be taken at all times.    Do not take other medications without consulting your doctor.     Recommended diet:  diabetic diet    Recommended activity: activity as tolerated    Post discharge care:    For questions regarding your Hospitalization or to contact the Hospital Medicine team, please call [phone].    Notify follow up health care provider or return to the emergency department if you cannot get hold of your doctor if you feel worse or experience symptoms similar to those that brought you to hospital    Adelina Gregg, APRN - NP  [address] [facility] [address] [phone]    Schedule an appointment as soon as possible for a visit          Information obtained by :  I understand that if any problems occur once I am at home I am to contact my physician and I understand and acknowledge receipt of the instructions indicated above.                                                                                                                                           Physician's or R.N.'s Signature                                                                   Date/Time                                                                                                                                              Patient or Representative Signature                                                          Date/Time

## 2025-04-30 ENCOUNTER — APPOINTMENT (OUTPATIENT)
Facility: HOSPITAL | Age: 45
DRG: 758 | End: 2025-04-30
Payer: MEDICAID

## 2025-04-30 PROBLEM — E11.649 TYPE 2 DIABETES MELLITUS WITH HYPOGLYCEMIA WITHOUT COMA (HCC): Status: ACTIVE | Noted: 2025-04-29

## 2025-04-30 PROBLEM — I10 HTN (HYPERTENSION), BENIGN: Status: ACTIVE | Noted: 2025-04-30

## 2025-04-30 PROBLEM — D25.1 INTRAMURAL LEIOMYOMA OF UTERUS: Status: ACTIVE | Noted: 2025-04-30

## 2025-04-30 PROBLEM — D50.9 MICROCYTIC ANEMIA: Status: ACTIVE | Noted: 2025-04-30

## 2025-04-30 LAB
ALBUMIN SERPL-MCNC: 2.4 G/DL (ref 3.5–5)
ALBUMIN/GLOB SERPL: 0.8 (ref 1.1–2.2)
ALP SERPL-CCNC: 75 U/L (ref 45–117)
ALT SERPL-CCNC: 18 U/L (ref 12–78)
ANION GAP SERPL CALC-SCNC: 5 MMOL/L (ref 2–12)
AST SERPL-CCNC: 11 U/L (ref 15–37)
BASOPHILS # BLD: 0.04 K/UL (ref 0–0.1)
BASOPHILS NFR BLD: 0.4 % (ref 0–1)
BILIRUB DIRECT SERPL-MCNC: 0.1 MG/DL (ref 0–0.2)
BILIRUB SERPL-MCNC: 0.2 MG/DL (ref 0.2–1)
BUN SERPL-MCNC: 14 MG/DL (ref 6–20)
BUN/CREAT SERPL: 18 (ref 12–20)
CALCIUM SERPL-MCNC: 7.9 MG/DL (ref 8.5–10.1)
CHLORIDE SERPL-SCNC: 109 MMOL/L (ref 97–108)
CO2 SERPL-SCNC: 24 MMOL/L (ref 21–32)
CREAT SERPL-MCNC: 0.78 MG/DL (ref 0.55–1.02)
DIFFERENTIAL METHOD BLD: ABNORMAL
EOSINOPHIL # BLD: 0.17 K/UL (ref 0–0.4)
EOSINOPHIL NFR BLD: 1.9 % (ref 0–7)
ERYTHROCYTE [DISTWIDTH] IN BLOOD BY AUTOMATED COUNT: 17.5 % (ref 11.5–14.5)
FERRITIN SERPL-MCNC: 6 NG/ML (ref 8–252)
FOLATE SERPL-MCNC: 14.5 NG/ML (ref 5–21)
GLOBULIN SER CALC-MCNC: 3.1 G/DL (ref 2–4)
GLUCOSE BLD STRIP.AUTO-MCNC: 125 MG/DL (ref 65–117)
GLUCOSE BLD STRIP.AUTO-MCNC: 152 MG/DL (ref 65–117)
GLUCOSE BLD STRIP.AUTO-MCNC: 247 MG/DL (ref 65–117)
GLUCOSE BLD STRIP.AUTO-MCNC: 300 MG/DL (ref 65–117)
GLUCOSE SERPL-MCNC: 270 MG/DL (ref 65–100)
HCT VFR BLD AUTO: 24.3 % (ref 35–47)
HGB BLD-MCNC: 7.4 G/DL (ref 11.5–16)
IMM GRANULOCYTES # BLD AUTO: 0.04 K/UL (ref 0–0.04)
IMM GRANULOCYTES NFR BLD AUTO: 0.4 % (ref 0–0.5)
IRON SATN MFR SERPL: 4 % (ref 20–50)
IRON SERPL-MCNC: 14 UG/DL (ref 35–150)
LYMPHOCYTES # BLD: 1.85 K/UL (ref 0.8–3.5)
LYMPHOCYTES NFR BLD: 20.6 % (ref 12–49)
MAGNESIUM SERPL-MCNC: 1.8 MG/DL (ref 1.6–2.4)
MCH RBC QN AUTO: 21.9 PG (ref 26–34)
MCHC RBC AUTO-ENTMCNC: 30.5 G/DL (ref 30–36.5)
MCV RBC AUTO: 71.9 FL (ref 80–99)
MONOCYTES # BLD: 0.66 K/UL (ref 0–1)
MONOCYTES NFR BLD: 7.3 % (ref 5–13)
NEUTS SEG # BLD: 6.25 K/UL (ref 1.8–8)
NEUTS SEG NFR BLD: 69.4 % (ref 32–75)
NRBC # BLD: 0 K/UL (ref 0–0.01)
NRBC BLD-RTO: 0 PER 100 WBC
PLATELET # BLD AUTO: 266 K/UL (ref 150–400)
PMV BLD AUTO: 10.5 FL (ref 8.9–12.9)
POTASSIUM SERPL-SCNC: 4.6 MMOL/L (ref 3.5–5.1)
PROT SERPL-MCNC: 5.5 G/DL (ref 6.4–8.2)
RBC # BLD AUTO: 3.38 M/UL (ref 3.8–5.2)
RBC MORPH BLD: ABNORMAL
SERVICE CMNT-IMP: ABNORMAL
SODIUM SERPL-SCNC: 138 MMOL/L (ref 136–145)
TIBC SERPL-MCNC: 347 UG/DL (ref 250–450)
VIT B12 SERPL-MCNC: 352 PG/ML (ref 193–986)
WBC # BLD AUTO: 9 K/UL (ref 3.6–11)

## 2025-04-30 PROCEDURE — 6360000002 HC RX W HCPCS: Performed by: INTERNAL MEDICINE

## 2025-04-30 PROCEDURE — 80076 HEPATIC FUNCTION PANEL: CPT

## 2025-04-30 PROCEDURE — 1100000000 HC RM PRIVATE

## 2025-04-30 PROCEDURE — 2500000003 HC RX 250 WO HCPCS: Performed by: INTERNAL MEDICINE

## 2025-04-30 PROCEDURE — 83540 ASSAY OF IRON: CPT

## 2025-04-30 PROCEDURE — 83735 ASSAY OF MAGNESIUM: CPT

## 2025-04-30 PROCEDURE — 85025 COMPLETE CBC W/AUTO DIFF WBC: CPT

## 2025-04-30 PROCEDURE — 6370000000 HC RX 637 (ALT 250 FOR IP): Performed by: INTERNAL MEDICINE

## 2025-04-30 PROCEDURE — 36415 COLL VENOUS BLD VENIPUNCTURE: CPT

## 2025-04-30 PROCEDURE — 82962 GLUCOSE BLOOD TEST: CPT

## 2025-04-30 PROCEDURE — 83550 IRON BINDING TEST: CPT

## 2025-04-30 PROCEDURE — 2580000003 HC RX 258: Performed by: INTERNAL MEDICINE

## 2025-04-30 PROCEDURE — 82746 ASSAY OF FOLIC ACID SERUM: CPT

## 2025-04-30 PROCEDURE — 82728 ASSAY OF FERRITIN: CPT

## 2025-04-30 PROCEDURE — 82607 VITAMIN B-12: CPT

## 2025-04-30 PROCEDURE — 99222 1ST HOSP IP/OBS MODERATE 55: CPT

## 2025-04-30 PROCEDURE — 94761 N-INVAS EAR/PLS OXIMETRY MLT: CPT

## 2025-04-30 PROCEDURE — 6360000004 HC RX CONTRAST MEDICATION: Performed by: INTERNAL MEDICINE

## 2025-04-30 PROCEDURE — 74177 CT ABD & PELVIS W/CONTRAST: CPT

## 2025-04-30 PROCEDURE — 80048 BASIC METABOLIC PNL TOTAL CA: CPT

## 2025-04-30 PROCEDURE — 6370000000 HC RX 637 (ALT 250 FOR IP)

## 2025-04-30 RX ORDER — INSULIN LISPRO 100 [IU]/ML
0.08 INJECTION, SOLUTION INTRAVENOUS; SUBCUTANEOUS
Status: DISCONTINUED | OUTPATIENT
Start: 2025-04-30 | End: 2025-05-01 | Stop reason: HOSPADM

## 2025-04-30 RX ORDER — IOPAMIDOL 755 MG/ML
100 INJECTION, SOLUTION INTRAVASCULAR
Status: COMPLETED | OUTPATIENT
Start: 2025-04-30 | End: 2025-04-30

## 2025-04-30 RX ORDER — INSULIN GLARGINE 100 [IU]/ML
0.2 INJECTION, SOLUTION SUBCUTANEOUS DAILY
Status: DISCONTINUED | OUTPATIENT
Start: 2025-04-30 | End: 2025-04-30

## 2025-04-30 RX ORDER — INSULIN LISPRO 100 [IU]/ML
0-8 INJECTION, SOLUTION INTRAVENOUS; SUBCUTANEOUS
Status: DISCONTINUED | OUTPATIENT
Start: 2025-04-30 | End: 2025-05-01 | Stop reason: HOSPADM

## 2025-04-30 RX ORDER — INSULIN GLARGINE 100 [IU]/ML
25 INJECTION, SOLUTION SUBCUTANEOUS DAILY
Status: DISCONTINUED | OUTPATIENT
Start: 2025-05-01 | End: 2025-05-01

## 2025-04-30 RX ORDER — INSULIN LISPRO 100 [IU]/ML
0.08 INJECTION, SOLUTION INTRAVENOUS; SUBCUTANEOUS
Status: DISCONTINUED | OUTPATIENT
Start: 2025-04-30 | End: 2025-04-30

## 2025-04-30 RX ORDER — INSULIN GLARGINE 100 [IU]/ML
8 INJECTION, SOLUTION SUBCUTANEOUS ONCE
Status: COMPLETED | OUTPATIENT
Start: 2025-04-30 | End: 2025-04-30

## 2025-04-30 RX ORDER — FLUCONAZOLE 100 MG/1
150 TABLET ORAL ONCE
Status: COMPLETED | OUTPATIENT
Start: 2025-04-30 | End: 2025-04-30

## 2025-04-30 RX ORDER — LISINOPRIL 5 MG/1
10 TABLET ORAL DAILY
Status: DISCONTINUED | OUTPATIENT
Start: 2025-04-30 | End: 2025-05-01 | Stop reason: HOSPADM

## 2025-04-30 RX ORDER — METRONIDAZOLE 500 MG/100ML
500 INJECTION, SOLUTION INTRAVENOUS EVERY 8 HOURS
Status: DISCONTINUED | OUTPATIENT
Start: 2025-04-30 | End: 2025-04-30

## 2025-04-30 RX ADMIN — SODIUM CHLORIDE, SODIUM LACTATE, POTASSIUM CHLORIDE, AND CALCIUM CHLORIDE 1000 ML: .6; .31; .03; .02 INJECTION, SOLUTION INTRAVENOUS at 00:00

## 2025-04-30 RX ADMIN — SODIUM CHLORIDE, PRESERVATIVE FREE 10 ML: 5 INJECTION INTRAVENOUS at 09:11

## 2025-04-30 RX ADMIN — INSULIN LISPRO 7 UNITS: 100 INJECTION, SOLUTION INTRAVENOUS; SUBCUTANEOUS at 12:50

## 2025-04-30 RX ADMIN — ACETAMINOPHEN 650 MG: 325 TABLET ORAL at 00:09

## 2025-04-30 RX ADMIN — FLUCONAZOLE 150 MG: 100 TABLET ORAL at 22:17

## 2025-04-30 RX ADMIN — LISINOPRIL 10 MG: 5 TABLET ORAL at 15:07

## 2025-04-30 RX ADMIN — INSULIN GLARGINE 8 UNITS: 100 INJECTION, SOLUTION SUBCUTANEOUS at 12:50

## 2025-04-30 RX ADMIN — SODIUM CHLORIDE, PRESERVATIVE FREE 10 ML: 5 INJECTION INTRAVENOUS at 22:17

## 2025-04-30 RX ADMIN — WATER 2000 MG: 1 INJECTION INTRAMUSCULAR; INTRAVENOUS; SUBCUTANEOUS at 22:17

## 2025-04-30 RX ADMIN — INSULIN GLARGINE 17 UNITS: 100 INJECTION, SOLUTION SUBCUTANEOUS at 09:10

## 2025-04-30 RX ADMIN — IOPAMIDOL 100 ML: 755 INJECTION, SOLUTION INTRAVENOUS at 04:36

## 2025-04-30 RX ADMIN — INSULIN LISPRO 1 UNITS: 100 INJECTION, SOLUTION INTRAVENOUS; SUBCUTANEOUS at 09:10

## 2025-04-30 RX ADMIN — INSULIN LISPRO 7 UNITS: 100 INJECTION, SOLUTION INTRAVENOUS; SUBCUTANEOUS at 17:14

## 2025-04-30 RX ADMIN — INSULIN LISPRO 3 UNITS: 100 INJECTION, SOLUTION INTRAVENOUS; SUBCUTANEOUS at 11:48

## 2025-04-30 RX ADMIN — METRONIDAZOLE 500 MG: 500 INJECTION, SOLUTION INTRAVENOUS at 06:10

## 2025-04-30 RX ADMIN — METRONIDAZOLE 500 MG: 500 INJECTION, SOLUTION INTRAVENOUS at 13:52

## 2025-04-30 ASSESSMENT — PAIN SCALES - GENERAL: PAINLEVEL_OUTOF10: 4

## 2025-04-30 ASSESSMENT — PAIN DESCRIPTION - ORIENTATION: ORIENTATION: LOWER

## 2025-04-30 ASSESSMENT — PAIN DESCRIPTION - LOCATION: LOCATION: ABDOMEN

## 2025-04-30 ASSESSMENT — PAIN DESCRIPTION - DESCRIPTORS: DESCRIPTORS: SORE

## 2025-04-30 NOTE — PROGRESS NOTES
Patient/caregivers speak [place] as their preferred language for their healthcare communication. For safe communication, use the [facility]  carts or call:      Key Duckwroth  Senior -Navigator - [phone]  General phone: [phone] ( [phone])   Email: [email or web address]      Please always document the use of interpreting services ('s ID number) in your clinical notes.      Our interpreters are available for team members working with limited?English proficient (LEP) patients remotely, via phone or video or in person (if needed for special cases).      When using family members to interpret, for the safety of the patient and protection of the communication of both our patient and [facility] staff the VRI or telephonic  should remain on the line to monitor that all communication is accurate and complete. The  should be instructed to notify [facility] staff immediately if there are any inaccuracies.

## 2025-04-30 NOTE — CARE COORDINATION
Care Management Initial Assessment  4/30/2025 11:54 AM  If patient is discharged prior to next notation, then this note serves as note for discharge by case management.    Reason for Admission:   Hypoglycemia [E16.2]  Tachycardia [R00.0]  Sepsis, due to unspecified organism, unspecified whether acute organ dysfunction present (HCC) [A41.9]         Patient Admission Status: Inpatient  Date Admitted to INP: 4/29/25  RUR: Readmission Risk Score: 11.3    Hospitalization in the last 30 days (Readmission):  No        Advance Care Planning:  Code Status: Full Code  Primary Healthcare Decision Maker: (P) Legal Next of Kin   Advance Directive: has NO advanced directive - not interested in additional information     __________________________________________________________________________  Assessment:      04/30/25 1151   Service Assessment   Patient Orientation Alert and Oriented   Cognition Alert   History Provided By Patient;Child/Family   Primary Caregiver Self   Accompanied By/Relationship daughter Griselda   Support Systems Spouse/Significant Other;Parent;Children;Family Members   Patient's Healthcare Decision Maker is: Legal Next of Kin   PCP Verified by CM Yes  (Pt goes to [facility] for primary care services)   Last Visit to PCP Within last 3 months   Prior Functional Level Independent in ADLs/IADLs   Current Functional Level Independent in ADLs/IADLs   Can patient return to prior living arrangement Yes   Ability to make needs known: Good   Family able to assist with home care needs: Yes   Would you like for me to discuss the discharge plan with any other family members/significant others, and if so, who? Yes  (family as needed)   Financial Resources None   Community Resources None   Social/Functional History   Lives With Spouse;Family   Type of Home Apartment   Home Equipment None   Receives Help From Family   Prior Level of Assist for ADLs Independent   Prior Level of Assist for Homemaking Independent

## 2025-04-30 NOTE — CONSULTS
[facility]  DIABETES MANAGEMENT CONSULT    Consulted by Provider for advanced nursing evaluation and care for inpatient blood glucose management.    Evaluation and Action Plan   Paula Ferguson is a 45 y.o.  female with PMHx of T2DM, obesity, HTN, who was brought in to ED after having hypoglycemic episode while at her mobile clinic ([facility]). She was given glucose tablets on way to ; BG on arrival was 113, but then dropped again to 17. Hypoglycemia protocol performed. DM consulted to assist with glycemic management.     Patient is under the care of Adelina Gregg and is on insulin regimen of Lantus 45 unit in am and 40 units in pm, as well as lispro 10 units with breakfast and dinner. She is also on bid Metformin. She knows to take the Metformin with food but did not seem to understand she needs to give lispro before meals. Basal dose seems to be a lot for her size, but she has had chronically high A1c's (currently 11.5%). She eats high carb meals and does not check BG regularly (maybe twice a week). We advised on insulin regimen change and emphasized on giving the lispro prior to all of her meals. Strongly advised her to check BG several times a day and report to PCP. Discussed long term complications from uncontrolled diabetes.    BG now coming back up. Has been in the 200s last 24 hours. She is eating. Basal restarted. Will make adjustments. Will add bolus insulin as well. Ideally, would like to keep overnight again to get better idea of proper insulin dosing, but she is self pay.     Blood glucose pattern    Significant diabetes-related events over the past 24-72 hours  A1C 11.5%  Fasting B  Pre-prandial: 300  Admitted with hypoglycemia      Management Rationale Action Plan   Medication   Basal needs Using 0.3 units/kg/D based on weight Lantus 25 units daily; this will likely need adjustment   Nutritional needs Covers carb load in meals Humalog 7 units with

## 2025-04-30 NOTE — PROGRESS NOTES
[facility]  [address] [place] [address]  [phone]      Hospitalist  Progress Note      NAME:       Paula Ferguson   :        1980  MRM:        [id number]    Date of service: 2025      Subjective: Patient seen and examined by me. Patient admitted with sepsis due to a UTI and hypoglycemia. She says she had not been tolerating metformin and took her insulin without eating. She has no pain now. Afebrile.  No video interpretor but discussed through her daughter who is fluent in English     Objective:    Vital Signs:    BP (!) 113/49   Pulse (!) 110   Temp 99.3 °F (37.4 °C) (Oral)   Resp 17   Ht 1.626 m (5' 4\")   Wt 83.5 kg (184 lb)   SpO2 99%   BMI 31.58 kg/m²        Intake/Output Summary (Last 24 hours) at 2025 0758  Last data filed at 2025 0623  Gross per 24 hour   Intake 3463.64 ml   Output 1250 ml   Net 2213.64 ml        Current inpatient medications reviewed:  Current Facility-Administered Medications   Medication Dose Route Frequency    metroNIDAZOLE (FLAGYL) 500 mg in 0.9% NaCl 100 mL IVPB premix  500 mg IntraVENous Q8H    glucagon injection 1 mg  1 mg SubCUTAneous PRN    dextrose 10 % infusion   IntraVENous Continuous PRN    0.9 % sodium chloride infusion   IntraVENous Continuous    glucose chewable tablet 16 g  4 tablet Oral PRN    dextrose bolus 10% 125 mL  125 mL IntraVENous PRN    Or    dextrose bolus 10% 250 mL  250 mL IntraVENous PRN    sodium chloride flush 0.9 % injection 5-40 mL  5-40 mL IntraVENous 2 times per day    sodium chloride flush 0.9 % injection 5-40 mL  5-40 mL IntraVENous PRN    0.9 % sodium chloride infusion   IntraVENous PRN    enoxaparin (LOVENOX) injection 40 mg  40 mg SubCUTAneous Daily    ondansetron (ZOFRAN-ODT) disintegrating tablet 4 mg  4 mg Oral Q8H PRN    Or    ondansetron (ZOFRAN) injection 4 mg  4 mg IntraVENous Q6H PRN

## 2025-05-01 VITALS
OXYGEN SATURATION: 100 % | HEIGHT: 64 IN | WEIGHT: 184 LBS | HEART RATE: 99 BPM | BODY MASS INDEX: 31.41 KG/M2 | DIASTOLIC BLOOD PRESSURE: 83 MMHG | RESPIRATION RATE: 12 BRPM | SYSTOLIC BLOOD PRESSURE: 156 MMHG | TEMPERATURE: 98.1 F

## 2025-05-01 LAB
BACTERIA SPEC CULT: ABNORMAL
BACTERIA SPEC CULT: ABNORMAL
CC UR VC: ABNORMAL
GLUCOSE BLD STRIP.AUTO-MCNC: 204 MG/DL (ref 65–117)
GLUCOSE BLD STRIP.AUTO-MCNC: 298 MG/DL (ref 65–117)
SERVICE CMNT-IMP: ABNORMAL

## 2025-05-01 PROCEDURE — 6370000000 HC RX 637 (ALT 250 FOR IP): Performed by: INTERNAL MEDICINE

## 2025-05-01 PROCEDURE — 6370000000 HC RX 637 (ALT 250 FOR IP)

## 2025-05-01 PROCEDURE — 82962 GLUCOSE BLOOD TEST: CPT

## 2025-05-01 PROCEDURE — 2500000003 HC RX 250 WO HCPCS: Performed by: INTERNAL MEDICINE

## 2025-05-01 PROCEDURE — 99231 SBSQ HOSP IP/OBS SF/LOW 25: CPT

## 2025-05-01 RX ORDER — ASCORBIC ACID 500 MG
500 TABLET ORAL DAILY
Status: SHIPPED | COMMUNITY
Start: 2025-05-01

## 2025-05-01 RX ORDER — INSULIN GLARGINE 100 [IU]/ML
32 INJECTION, SOLUTION SUBCUTANEOUS DAILY
Qty: 5 ADJUSTABLE DOSE PRE-FILLED PEN SYRINGE | Refills: 3 | Status: SHIPPED | OUTPATIENT
Start: 2025-05-01

## 2025-05-01 RX ORDER — FLUCONAZOLE 100 MG/1
200 TABLET ORAL DAILY
Status: DISCONTINUED | OUTPATIENT
Start: 2025-05-01 | End: 2025-05-01 | Stop reason: HOSPADM

## 2025-05-01 RX ORDER — INSULIN GLARGINE 100 [IU]/ML
32 INJECTION, SOLUTION SUBCUTANEOUS DAILY
Status: DISCONTINUED | OUTPATIENT
Start: 2025-05-01 | End: 2025-05-01 | Stop reason: HOSPADM

## 2025-05-01 RX ORDER — FERROUS SULFATE 325(65) MG
325 TABLET ORAL
Qty: 90 TABLET | Refills: 1 | Status: SHIPPED | OUTPATIENT
Start: 2025-05-01

## 2025-05-01 RX ORDER — FLUCONAZOLE 200 MG/1
200 TABLET ORAL DAILY
Qty: 12 TABLET | Refills: 0 | Status: SHIPPED | OUTPATIENT
Start: 2025-05-02 | End: 2025-05-14

## 2025-05-01 RX ORDER — INSULIN LISPRO 100 [IU]/ML
10 INJECTION, SOLUTION INTRAVENOUS; SUBCUTANEOUS
Qty: 5 ADJUSTABLE DOSE PRE-FILLED PEN SYRINGE | Refills: 3 | Status: SHIPPED | OUTPATIENT
Start: 2025-05-01

## 2025-05-01 RX ADMIN — INSULIN LISPRO 2 UNITS: 100 INJECTION, SOLUTION INTRAVENOUS; SUBCUTANEOUS at 12:35

## 2025-05-01 RX ADMIN — FLUCONAZOLE 200 MG: 100 TABLET ORAL at 11:00

## 2025-05-01 RX ADMIN — INSULIN LISPRO 7 UNITS: 100 INJECTION, SOLUTION INTRAVENOUS; SUBCUTANEOUS at 12:36

## 2025-05-01 RX ADMIN — SODIUM CHLORIDE, PRESERVATIVE FREE 10 ML: 5 INJECTION INTRAVENOUS at 09:18

## 2025-05-01 RX ADMIN — INSULIN LISPRO 7 UNITS: 100 INJECTION, SOLUTION INTRAVENOUS; SUBCUTANEOUS at 09:17

## 2025-05-01 RX ADMIN — LISINOPRIL 10 MG: 5 TABLET ORAL at 09:17

## 2025-05-01 RX ADMIN — INSULIN GLARGINE 32 UNITS: 100 INJECTION, SOLUTION SUBCUTANEOUS at 09:17

## 2025-05-01 NOTE — DISCHARGE SUMMARY
[facility]  [address] [place] [address]  Tel: [phone]    Hospital Medicine Discharge Summary    Patient ID:    Paula Ferguson  Age:              45 y.o.    : 1980  MRN:             [id number]     PCP: Adelina Gregg APRN - NP     Date of Admission: 2025    Date of Discharge:  2025    Discharge Diagnoses:  Principal Problem:    Type 2 diabetes mellitus with hypoglycemia without coma (HCC)  Active Problems:    UTI (urinary tract infection)    Microcytic anemia    Intramural leiomyoma of uterus    HTN (hypertension), benign  Resolved Problems:    * No resolved hospital problems. *       Reason for admission:    Hypoglycemia [E16.2]  Tachycardia [R00.0]  Sepsis, due to unspecified organism, unspecified whether acute organ dysfunction present (HCC) [A41.9]    Diagnostic testing:    Laboratory data reviewed and independently interpreted:    Recent Labs     25  0930 25  0142   WBC 8.2 9.0   HGB 9.0* 7.4*   HCT 29.2* 24.3*   RBC 4.13 3.38*   MCV 70.7* 71.9*   MCH 21.8* 21.9*    266     Lab Results   Component Value Date/Time    LACTA 1.7 2021 09:12 AM     Recent Labs     25  0930 25  0142    138   K 3.1* 4.6    109*   CO2 25 24   GLUCOSE 70 270*   BUN 23* 14   CREATININE 0.56 0.78   CALCIUM 9.1 7.9*   MG 1.9 1.8   BILITOT 0.4 0.2   ALKPHOS 101 75   AST 21 11*   ALT 17 18     No components found for: \"GLUCOSEPOC\"  Lab Results   Component Value Date/Time    CHOL 147 2024 09:53 AM    TRIG 105 2024 09:53 AM    HDL 49 2024 09:53 AM     Imaging data reviewed:    CT ABDOMEN PELVIS W IV CONTRAST Additional Contrast? Radiologist Recommendation  Result Date: 2025  No bowel obstruction, ileus or perforation. Nonvisualization of the appendix. No secondary signs of acute appendicitis. If clinical suspicion for acute appendicitis persists,

## 2025-05-01 NOTE — CONSULTS
[facility]  DIABETES MANAGEMENT CONSULT    Consulted by Provider for advanced nursing evaluation and care for inpatient blood glucose management.    Evaluation and Action Plan   Paula Ferguson is a 45 y.o.  female with PMHx of T2DM, obesity, HTN, who was brought in to ED after having hypoglycemic episode while at her mobile clinic ([facility]). She was given glucose tablets on way to ; BG on arrival was 113, but then dropped again to 17. Hypoglycemia protocol performed. DM consulted to assist with glycemic management.     Patient is under the care of Adelina Gregg and is on insulin regimen of Lantus 45 unit in am and 40 units in pm, as well as lispro 10 units with breakfast and dinner. She is also on bid Metformin. She knows to take the Metformin with food but did not seem to understand she needs to give lispro before meals. Basal dose seems to be a lot for her size, but she has had chronically high A1c's (currently 11.5%). She eats high carb meals and does not check BG regularly (maybe twice a week). We advised on insulin regimen change and emphasized on giving the lispro prior to all of her meals. Strongly advised her to check BG several times a day and report to PCP. Discussed long term complications from uncontrolled diabetes.    5/1 - Patient doing well. BG more stable. BG did get down to 125-152 last evening once started on bolus insulin. BG this am was not fasting; she had already eaten breakfast when this was done. However, given her A1c and previous high doses of insulin at home, will increase basal dose today and for discharge. Discussed new insulin regimen with patient (once daily Lantus and lispro 3 times a day with each meal, plus Metformin). I believe she was overbasalized and actually needs more meal coverage, as patient eats consistently high carb meals. Patient states understanding. She also asked about recent swelling in her face and legs (about 3

## 2025-05-02 ENCOUNTER — TELEPHONE (OUTPATIENT)
Age: 45
End: 2025-05-02

## 2025-05-04 LAB
BACTERIA SPEC CULT: NORMAL
BACTERIA SPEC CULT: NORMAL
SERVICE CMNT-IMP: NORMAL
SERVICE CMNT-IMP: NORMAL

## 2025-05-06 NOTE — PROGRESS NOTES
Physician Progress Note      PATIENT:               KRISTA HALL  [facility] #:                  [id number]  :                       1980  ADMIT DATE:       2025 9:21 AM  DISCH DATE:        2025 1:01 PM  RESPONDING  PROVIDER #:        Monroe Fisher MD          QUERY TEXT:    Sepsis is documented in the Discharge Summary by Monroe Fisher MD at   2025. Please provide additional clinical indicators supportive of your   documentation. Or please document if the diagnosis of sepsis has been ruled   out after study.    The clinical indicators include:  -\"Acute urinary tract infection. Patient was given 1 dose of IV Zosyn and IV   vancomycin in the emergency department at [place]. This has been de-escalated   to IV ceftriaxone. Because patient was tender to palpation in the suprapubic   region as well as right lower quadrant, a CT of the abdomen/pelvis with IV   contrast was ordered. Indication was \"to rule out appendicitis\" and p.o.   contrast was deferred to radiologist. P.o. contrast was not used in the study   however, and radiologist read the CT scan of the abdomen as appendix not being   visualized, however without any secondary signs of appendicitis. On exam,   patient is much more tender in the suprapubic region than the right lower   quadrant. IV Flagyl was started empirically given the CT scan findings   regardless--would just monitor clinically. Procalcitonin levels are completely   normal, arguing against bacterial infection. Lactic acid level improved from   2.51 to 1.7.\" (H&P by Mireya Vitale DO at 2025)    -\"Sepsis due to a UTI (urinary tract infection) POA: blood and urine cultures   neg thus far. CTA chest, CT scan abdomen and pelvis all unremarkable. Lactic   acid normal. Urine cultures isolated yeasts. In the setting of her symptoms,   will continue with Diflucan. Discharged home in stable condition.\" (Discharge   Summary by Monroe Fisher MD at

## 2025-05-09 ENCOUNTER — OFFICE VISIT (OUTPATIENT)
Age: 45
End: 2025-05-09

## 2025-05-09 VITALS
DIASTOLIC BLOOD PRESSURE: 66 MMHG | TEMPERATURE: 98.6 F | HEART RATE: 104 BPM | SYSTOLIC BLOOD PRESSURE: 144 MMHG | BODY MASS INDEX: 31.24 KG/M2 | OXYGEN SATURATION: 100 % | WEIGHT: 182 LBS

## 2025-05-09 DIAGNOSIS — Z71.89 COUNSELING AND COORDINATION OF CARE: Primary | ICD-10-CM

## 2025-05-09 DIAGNOSIS — E11.8 DM (DIABETES MELLITUS), TYPE 2 WITH COMPLICATIONS (HCC): Primary | ICD-10-CM

## 2025-05-09 DIAGNOSIS — D50.0 IRON DEFICIENCY ANEMIA DUE TO CHRONIC BLOOD LOSS: ICD-10-CM

## 2025-05-09 DIAGNOSIS — I10 PRIMARY HYPERTENSION: ICD-10-CM

## 2025-05-09 LAB — GLUCOSE, POC: NORMAL MG/DL

## 2025-05-09 PROCEDURE — 82962 GLUCOSE BLOOD TEST: CPT | Performed by: FAMILY MEDICINE

## 2025-05-09 PROCEDURE — 3077F SYST BP >= 140 MM HG: CPT | Performed by: FAMILY MEDICINE

## 2025-05-09 PROCEDURE — 3046F HEMOGLOBIN A1C LEVEL >9.0%: CPT | Performed by: FAMILY MEDICINE

## 2025-05-09 PROCEDURE — 3078F DIAST BP <80 MM HG: CPT | Performed by: FAMILY MEDICINE

## 2025-05-09 PROCEDURE — 99214 OFFICE O/P EST MOD 30 MIN: CPT | Performed by: FAMILY MEDICINE

## 2025-05-09 RX ORDER — LISINOPRIL 10 MG/1
10 TABLET ORAL 2 TIMES DAILY
COMMUNITY
Start: 2025-05-09

## 2025-05-09 ASSESSMENT — PATIENT HEALTH QUESTIONNAIRE - PHQ9
SUM OF ALL RESPONSES TO PHQ QUESTIONS 1-9: 0
2. FEELING DOWN, DEPRESSED OR HOPELESS: NOT AT ALL
1. LITTLE INTEREST OR PLEASURE IN DOING THINGS: NOT AT ALL
SUM OF ALL RESPONSES TO PHQ QUESTIONS 1-9: 0

## 2025-05-09 ASSESSMENT — ENCOUNTER SYMPTOMS
CHEST TIGHTNESS: 0
SHORTNESS OF BREATH: 0
COUGH: 0

## 2025-05-09 NOTE — PROGRESS NOTES
Patient came to [facility] as a walk in regarding [facility] FA application. Patient explained went to the hospital and they gave he [facility] FA application. [facility] explained she has FA approval until 11/30/25. [facility] explained to patient she will need to send copy of [facility] FA approval letter if she receives any radiology bill. Patient verbalized understanding the process.

## 2025-05-09 NOTE — PROGRESS NOTES
: Pallavi Mendoza    Chief Complaint   Patient presents with    Diabetes     Follow up after hospital admission. Pt notes she is still feeing a little dizzy.    Blood Infection     Hospital follow up after admission for sepsis     Vitals:    05/09/25 1321 05/09/25 1329   BP: (!) 141/70 (!) 144/66   BP Site: Right Upper Arm Right Upper Arm   Patient Position: Sitting Sitting   Pulse: (!) 104    Temp: 98.6 °F (37 °C)    TempSrc: Temporal    SpO2: 100%    Weight: 82.6 kg (182 lb)      Results for orders placed or performed in visit on 05/09/25   AMB POC GLUCOSE BLOOD, BY GLUCOSE MONITORING DEVICE   Result Value Ref Range    Glucose,  nf MG/DL         
Paula Ferguson seen at d/c, full name and  verified. After Visit Summary provided and all discharge instructions reviewed w/pt. Instructed pt to schedule a f/u appt in 2-3 weeks for anemia and DM. Medication list reviewed and education provided, including possible side effects. Connected pt with  to go over the financial assistance process. Pt verbalizes understanding, and denies any questions.   -- Dee Lubin RN  
alcohol (wine, a little)    Review of Systems   Constitutional:  Negative for unexpected weight change.   Respiratory:  Negative for cough, chest tightness and shortness of breath.    Cardiovascular:  Negative for chest pain, palpitations and leg swelling.   Neurological:  Positive for light-headedness.   No dental pain.  Occasional lightheadedness    OBJECTIVE:  Blood pressure (!) 144/66, pulse (!) 104, temperature 98.6 °F (37 °C), temperature source Temporal, weight 82.6 kg (182 lb), last menstrual period 04/20/2025, SpO2 100%.  Physical Exam  CONSTITUTIONAL:  Well developed.  No apparent distress.  PSYCHIATRIC: Oriented to time, place, person & situation.  Appropriate mood and affect.  NECK:  Normal inspection, normal palpation without any lymphadenopathy, masses, or thyromegaly  CARDIOVASCULAR:  Regular rate and rhythm.  Normal S1, S2.  No extra sounds.  RESPIRATORY:  Normal effort.  Normal ascultation without wheezing.  EXTREMITIES:  No edema.    Results for orders placed or performed in visit on 05/09/25   AMB POC GLUCOSE BLOOD, BY GLUCOSE MONITORING DEVICE   Result Value Ref Range    Glucose,  nf MG/DL          An electronic signature was used to authenticate this note.  -- Erick Hutchinson MD

## 2025-05-28 ENCOUNTER — OFFICE VISIT (OUTPATIENT)
Age: 45
End: 2025-05-28

## 2025-05-28 DIAGNOSIS — Z71.89 COUNSELING AND COORDINATION OF CARE: Primary | ICD-10-CM

## 2025-05-28 NOTE — PROGRESS NOTES
Patient came to [facility] as a walk in at [place]. [facility] has printed [facility] FA approval letter and made several copies for patient. A copy of approval letter has been sent with lower bill to following: [facility] [employer]. And [facility] . Patient has been instructed to send a copy of approval letter with future bills she receives. Patient has verbalized understanding.

## 2025-05-29 PROBLEM — N39.0 UTI (URINARY TRACT INFECTION): Status: RESOLVED | Noted: 2025-04-29 | Resolved: 2025-05-29

## 2025-06-03 ENCOUNTER — OFFICE VISIT (OUTPATIENT)
Age: 45
End: 2025-06-03

## 2025-06-03 DIAGNOSIS — Z71.3 DIETARY COUNSELING AND SURVEILLANCE: Primary | ICD-10-CM

## 2025-06-03 NOTE — PROGRESS NOTES
[facility]   Nutrition Assessment - Medical Nutrition Therapy   FOLLOW-UP EVALUATION         Patient Name: Paula Ferguson : 1980   Reason for visit: Follow up: T2DM   Referral Source: [facility] Date: 6/3/2025     Ht: Ht Readings from Last 1 Encounters:   25 1.626 m (5' 4\")    Current Wt: Wt Readings from Last 1 Encounters:   25 82.6 kg (182 lb)      BMI: 31.24 kg/m2 Category: Class 1 Obesity (30 - 34.9)     Pertinent Medications:     Current Outpatient Medications:     lisinopril (PRINIVIL;ZESTRIL) 10 MG tablet, Take 1 tablet by mouth in the morning and at bedtime NOT SENT TO PHARMACY, to update record., Disp: , Rfl:     insulin lispro, 1 Unit Dial, (HUMALOG KWIKPEN) 100 UNIT/ML SOPN, Inject 10 Units into the skin 3 times daily (before meals) Please take with all three meals.  Please honor soledad, Disp: 5 Adjustable Dose Pre-filled Pen Syringe, Rfl: 3    insulin glargine (LANTUS SOLOSTAR) 100 UNIT/ML injection pen, Inject 32 Units into the skin daily, Disp: 5 Adjustable Dose Pre-filled Pen Syringe, Rfl: 3    ferrous sulfate (IRON 325) 325 (65 Fe) MG tablet, Take 1 tablet by mouth daily (with breakfast), Disp: 90 tablet, Rfl: 1    vitamin C (ASCORBIC ACID) 500 MG tablet, Take 1 tablet by mouth daily, Disp: , Rfl:     metFORMIN (GLUCOPHAGE) 500 MG tablet, 2 po qam and 1 po with dinner, Disp: 270 tablet, Rfl: 3    amitriptyline (ELAVIL) 10 MG tablet, Take 1 tablet by mouth nightly For hands and feet, Disp: 90 tablet, Rfl: 1    Insulin Pen Needle (PEN NEEDLES) 32G X 5 MM MISC, To be used with solostar pen, Disp: 50 each, Rfl: 1     Biochemical Data:   Hemoglobin A1C   Date Value Ref Range Status   2025 11.5 (H) 4.0 - 5.6 % Final     Comment:     (NOTE)  HbA1C Interpretive Ranges  <5.7              Normal  5.7 - 6.4         Consider Prediabetes  >6.5              Consider Diabetes       Lab Results   Component Value Date     2025    K 4.6

## 2025-06-12 DIAGNOSIS — E11.8 DM (DIABETES MELLITUS), TYPE 2 WITH COMPLICATIONS (HCC): ICD-10-CM

## 2025-06-12 RX ORDER — LISINOPRIL 5 MG/1
5 TABLET ORAL DAILY
Qty: 90 TABLET | Refills: 0 | OUTPATIENT
Start: 2025-06-12

## 2025-07-08 ENCOUNTER — OFFICE VISIT (OUTPATIENT)
Age: 45
End: 2025-07-08

## 2025-07-08 DIAGNOSIS — Z71.89 COUNSELING AND COORDINATION OF CARE: Primary | ICD-10-CM

## 2025-07-08 NOTE — PROGRESS NOTES
Patient came to [facility] as a walk in regarding medical bills. Patient has [facility] FA approval letter Per patient requested [facility] will mail copy of the approval letter to [facility]. Patient verbalized understanding the process.

## 2025-07-28 DIAGNOSIS — E11.8 DM (DIABETES MELLITUS), TYPE 2 WITH COMPLICATIONS (HCC): ICD-10-CM

## 2025-08-04 RX ORDER — PEN NEEDLE, DIABETIC 31 GX5/16"
NEEDLE, DISPOSABLE MISCELLANEOUS
Qty: 100 EACH | Refills: 0 | Status: SHIPPED | OUTPATIENT
Start: 2025-08-04

## 2025-08-04 RX ORDER — INSULIN GLARGINE 100 [IU]/ML
INJECTION, SOLUTION SUBCUTANEOUS
Qty: 15 ML | Refills: 0 | Status: SHIPPED | OUTPATIENT
Start: 2025-08-04

## 2025-08-04 RX ORDER — LISINOPRIL 10 MG/1
10 TABLET ORAL 2 TIMES DAILY
Qty: 30 TABLET | Refills: 5 | Status: SHIPPED | OUTPATIENT
Start: 2025-08-04

## 2025-08-18 ENCOUNTER — OFFICE VISIT (OUTPATIENT)
Age: 45
End: 2025-08-18

## 2025-08-18 ENCOUNTER — HOSPITAL ENCOUNTER (OUTPATIENT)
Facility: HOSPITAL | Age: 45
Setting detail: SPECIMEN
Discharge: HOME | End: 2025-08-21

## 2025-08-18 VITALS
DIASTOLIC BLOOD PRESSURE: 90 MMHG | HEART RATE: 94 BPM | OXYGEN SATURATION: 96 % | BODY MASS INDEX: 30.28 KG/M2 | TEMPERATURE: 97.3 F | SYSTOLIC BLOOD PRESSURE: 158 MMHG | WEIGHT: 176.4 LBS

## 2025-08-18 DIAGNOSIS — E11.8 DM (DIABETES MELLITUS), TYPE 2 WITH COMPLICATIONS (HCC): Primary | ICD-10-CM

## 2025-08-18 DIAGNOSIS — I10 PRIMARY HYPERTENSION: ICD-10-CM

## 2025-08-18 DIAGNOSIS — D50.0 IRON DEFICIENCY ANEMIA DUE TO CHRONIC BLOOD LOSS: ICD-10-CM

## 2025-08-18 DIAGNOSIS — Z71.89 COUNSELING AND COORDINATION OF CARE: Primary | ICD-10-CM

## 2025-08-18 DIAGNOSIS — Z13.9 ENCOUNTER FOR SCREENING: ICD-10-CM

## 2025-08-18 LAB
ALBUMIN SERPL-MCNC: 3.4 G/DL (ref 3.5–5.2)
ALBUMIN/GLOB SERPL: 0.9 (ref 1.1–2.2)
ALP SERPL-CCNC: 108 U/L (ref 35–104)
ALT SERPL-CCNC: 22 U/L (ref 10–35)
ANION GAP SERPL CALC-SCNC: 10 MMOL/L (ref 2–14)
AST SERPL-CCNC: 19 U/L (ref 10–35)
BASOPHILS # BLD: 0.04 K/UL (ref 0–0.1)
BASOPHILS NFR BLD: 0.6 % (ref 0–1)
BILIRUB SERPL-MCNC: 0.2 MG/DL (ref 0–1.2)
BILIRUBIN, URINE, POC: NEGATIVE
BLOOD URINE, POC: NORMAL
BUN SERPL-MCNC: 22 MG/DL (ref 6–20)
BUN/CREAT SERPL: 30 (ref 12–20)
CALCIUM SERPL-MCNC: 9.6 MG/DL (ref 8.6–10)
CHLORIDE SERPL-SCNC: 96 MMOL/L (ref 98–107)
CHOLEST SERPL-MCNC: 175 MG/DL (ref 0–200)
CO2 SERPL-SCNC: 26 MMOL/L (ref 20–29)
CREAT SERPL-MCNC: 0.74 MG/DL (ref 0.6–1)
CREAT UR-MCNC: 26.6 MG/DL (ref 28–217)
DIFFERENTIAL METHOD BLD: ABNORMAL
EOSINOPHIL # BLD: 0.18 K/UL (ref 0–0.4)
EOSINOPHIL NFR BLD: 2.7 % (ref 0–7)
ERYTHROCYTE [DISTWIDTH] IN BLOOD BY AUTOMATED COUNT: 13.9 % (ref 11.5–14.5)
EST. AVERAGE GLUCOSE BLD GHB EST-MCNC: 298 MG/DL
GLOBULIN SER CALC-MCNC: 3.8 G/DL (ref 2–4)
GLUCOSE SERPL-MCNC: 452 MG/DL (ref 65–100)
GLUCOSE URINE, POC: 1000
GLUCOSE, POC: NORMAL MG/DL
HBA1C MFR BLD: 12 % (ref 4–5.6)
HCT VFR BLD AUTO: 39.4 % (ref 35–47)
HDLC SERPL-MCNC: 42 MG/DL (ref 40–60)
HDLC SERPL: 4.1 (ref 0–5)
HEMOGLOBIN, POC: 12.8 G/DL
HGB BLD-MCNC: 12.7 G/DL (ref 11.5–16)
IMM GRANULOCYTES # BLD AUTO: 0.02 K/UL (ref 0–0.04)
IMM GRANULOCYTES NFR BLD AUTO: 0.3 % (ref 0–0.5)
KETONES, URINE, POC: NEGATIVE
LDLC SERPL CALC-MCNC: 66 MG/DL (ref 0–100)
LEUKOCYTE ESTERASE, URINE, POC: NEGATIVE
LYMPHOCYTES # BLD: 1.52 K/UL (ref 0.8–3.5)
LYMPHOCYTES NFR BLD: 23.1 % (ref 12–49)
MCH RBC QN AUTO: 25.7 PG (ref 26–34)
MCHC RBC AUTO-ENTMCNC: 32.2 G/DL (ref 30–36.5)
MCV RBC AUTO: 79.6 FL (ref 80–99)
MICROALBUMIN UR-MCNC: 8 MG/DL
MICROALBUMIN/CREAT UR-RTO: 301 MG/G
MONOCYTES # BLD: 0.34 K/UL (ref 0–1)
MONOCYTES NFR BLD: 5.2 % (ref 5–13)
NEUTS SEG # BLD: 4.47 K/UL (ref 1.8–8)
NEUTS SEG NFR BLD: 68.1 % (ref 32–75)
NITRITE, URINE, POC: NEGATIVE
NRBC # BLD: 0 K/UL (ref 0–0.01)
NRBC BLD-RTO: 0 PER 100 WBC
PH, URINE, POC: 6 (ref 4.6–8)
PLATELET # BLD AUTO: 226 K/UL (ref 150–400)
PMV BLD AUTO: 11.5 FL (ref 8.9–12.9)
POTASSIUM SERPL-SCNC: 4.9 MMOL/L (ref 3.5–5.1)
PROT SERPL-MCNC: 7.2 G/DL (ref 6.4–8.3)
PROTEIN,URINE, POC: NORMAL
RBC # BLD AUTO: 4.95 M/UL (ref 3.8–5.2)
SODIUM SERPL-SCNC: 132 MMOL/L (ref 136–145)
SPECIFIC GRAVITY, URINE, POC: 1.01 (ref 1–1.03)
TRIGL SERPL-MCNC: 332 MG/DL (ref 0–150)
URINALYSIS CLARITY, POC: CLEAR
URINALYSIS COLOR, POC: YELLOW
UROBILINOGEN, POC: NORMAL
VLDLC SERPL CALC-MCNC: 66 MG/DL
WBC # BLD AUTO: 6.6 K/UL (ref 3.6–11)

## 2025-08-18 PROCEDURE — 80053 COMPREHEN METABOLIC PANEL: CPT

## 2025-08-18 PROCEDURE — 82570 ASSAY OF URINE CREATININE: CPT

## 2025-08-18 PROCEDURE — 82043 UR ALBUMIN QUANTITATIVE: CPT

## 2025-08-18 PROCEDURE — 80061 LIPID PANEL: CPT

## 2025-08-18 PROCEDURE — 83036 HEMOGLOBIN GLYCOSYLATED A1C: CPT

## 2025-08-18 PROCEDURE — 85025 COMPLETE CBC W/AUTO DIFF WBC: CPT

## 2025-08-18 RX ORDER — FERROUS SULFATE 325(65) MG
325 TABLET ORAL
Qty: 90 TABLET | Refills: 1 | Status: SHIPPED | OUTPATIENT
Start: 2025-08-18

## 2025-08-18 RX ORDER — PEN NEEDLE, DIABETIC 30 GX3/16"
2 NEEDLE, DISPOSABLE MISCELLANEOUS 2 TIMES DAILY
Qty: 100 EACH | Refills: 3 | Status: SHIPPED | OUTPATIENT
Start: 2025-08-18

## 2025-08-18 RX ORDER — LISINOPRIL 20 MG/1
20 TABLET ORAL 2 TIMES DAILY
Qty: 180 TABLET | Refills: 1 | Status: SHIPPED | OUTPATIENT
Start: 2025-08-18

## 2025-08-18 RX ORDER — PEN NEEDLE, DIABETIC 31 GX5/16"
NEEDLE, DISPOSABLE MISCELLANEOUS
Qty: 100 EACH | Refills: 0 | Status: SHIPPED | OUTPATIENT
Start: 2025-08-18 | End: 2025-08-18 | Stop reason: SINTOL

## 2025-08-18 RX ORDER — INSULIN LISPRO 100 [IU]/ML
10 INJECTION, SOLUTION INTRAVENOUS; SUBCUTANEOUS
Qty: 5 ADJUSTABLE DOSE PRE-FILLED PEN SYRINGE | Refills: 3 | Status: SHIPPED | OUTPATIENT
Start: 2025-08-18

## 2025-08-18 RX ORDER — INSULIN GLARGINE 100 [IU]/ML
30 INJECTION, SOLUTION SUBCUTANEOUS 2 TIMES DAILY
Qty: 15 ML | Refills: 3 | Status: SHIPPED | OUTPATIENT
Start: 2025-08-18

## 2025-08-18 ASSESSMENT — PATIENT HEALTH QUESTIONNAIRE - PHQ9
SUM OF ALL RESPONSES TO PHQ QUESTIONS 1-9: 2
1. LITTLE INTEREST OR PLEASURE IN DOING THINGS: SEVERAL DAYS
SUM OF ALL RESPONSES TO PHQ QUESTIONS 1-9: 2
2. FEELING DOWN, DEPRESSED OR HOPELESS: SEVERAL DAYS

## 2025-08-25 ENCOUNTER — RESULTS FOLLOW-UP (OUTPATIENT)
Age: 45
End: 2025-08-25

## 2025-09-03 ENCOUNTER — CLINICAL SUPPORT (OUTPATIENT)
Age: 45
End: 2025-09-03

## 2025-09-03 VITALS
HEART RATE: 85 BPM | TEMPERATURE: 98 F | OXYGEN SATURATION: 100 % | DIASTOLIC BLOOD PRESSURE: 69 MMHG | SYSTOLIC BLOOD PRESSURE: 142 MMHG

## 2025-09-03 DIAGNOSIS — E11.40 DIABETIC NEUROPATHY, PAINFUL (HCC): ICD-10-CM

## 2025-09-03 RX ORDER — AMITRIPTYLINE HYDROCHLORIDE 10 MG/1
10 TABLET ORAL NIGHTLY
Qty: 90 TABLET | Refills: 1 | Status: SHIPPED | OUTPATIENT
Start: 2025-09-03